# Patient Record
Sex: FEMALE | Race: WHITE | NOT HISPANIC OR LATINO | Employment: FULL TIME | URBAN - METROPOLITAN AREA
[De-identification: names, ages, dates, MRNs, and addresses within clinical notes are randomized per-mention and may not be internally consistent; named-entity substitution may affect disease eponyms.]

---

## 2017-01-18 DIAGNOSIS — K58.9 IRRITABLE BOWEL SYNDROME WITHOUT DIARRHEA: ICD-10-CM

## 2017-01-18 RX ORDER — DICYCLOMINE HYDROCHLORIDE 10 MG/1
CAPSULE ORAL
Qty: 60 CAPSULE | Refills: 2 | Status: SHIPPED | OUTPATIENT
Start: 2017-01-18 | End: 2017-05-01 | Stop reason: SDUPTHER

## 2017-01-30 DIAGNOSIS — J40 BRONCHITIS: ICD-10-CM

## 2017-01-31 RX ORDER — BENZONATATE 200 MG/1
CAPSULE ORAL
Qty: 30 CAPSULE | Refills: 0 | OUTPATIENT
Start: 2017-01-31

## 2017-02-01 ENCOUNTER — OFFICE VISIT (OUTPATIENT)
Dept: FAMILY MEDICINE CLINIC | Facility: CLINIC | Age: 62
End: 2017-02-01

## 2017-02-01 VITALS
RESPIRATION RATE: 18 BRPM | HEART RATE: 83 BPM | SYSTOLIC BLOOD PRESSURE: 123 MMHG | DIASTOLIC BLOOD PRESSURE: 78 MMHG | OXYGEN SATURATION: 92 % | TEMPERATURE: 98.6 F | HEIGHT: 64 IN | BODY MASS INDEX: 50.02 KG/M2 | WEIGHT: 293 LBS

## 2017-02-01 DIAGNOSIS — J45.41 ASTHMATIC BRONCHITIS, MODERATE PERSISTENT, WITH ACUTE EXACERBATION: Primary | ICD-10-CM

## 2017-02-01 PROCEDURE — 94640 AIRWAY INHALATION TREATMENT: CPT | Performed by: FAMILY MEDICINE

## 2017-02-01 PROCEDURE — 99214 OFFICE O/P EST MOD 30 MIN: CPT | Performed by: FAMILY MEDICINE

## 2017-02-01 RX ORDER — IPRATROPIUM BROMIDE AND ALBUTEROL SULFATE 2.5; .5 MG/3ML; MG/3ML
3 SOLUTION RESPIRATORY (INHALATION) EVERY 4 HOURS PRN
Qty: 360 ML | Refills: 3 | Status: SHIPPED | OUTPATIENT
Start: 2017-02-01 | End: 2017-05-01 | Stop reason: SDUPTHER

## 2017-02-01 RX ORDER — LEVOFLOXACIN 500 MG/1
500 TABLET, FILM COATED ORAL DAILY
Qty: 10 TABLET | Refills: 0 | Status: SHIPPED | OUTPATIENT
Start: 2017-02-01 | End: 2017-02-11

## 2017-02-01 RX ORDER — AMOXICILLIN 875 MG/1
TABLET, COATED ORAL
COMMUNITY
Start: 2017-01-29 | End: 2017-02-01

## 2017-02-01 RX ORDER — BENZONATATE 200 MG/1
200 CAPSULE ORAL 3 TIMES DAILY PRN
Qty: 42 CAPSULE | Refills: 2 | Status: SHIPPED | OUTPATIENT
Start: 2017-02-01 | End: 2017-05-31

## 2017-02-01 RX ORDER — METHYLPREDNISOLONE 4 MG/1
TABLET ORAL
COMMUNITY
Start: 2017-01-29 | End: 2017-03-27

## 2017-02-01 RX ORDER — ALBUTEROL SULFATE 90 UG/1
2 AEROSOL, METERED RESPIRATORY (INHALATION) EVERY 6 HOURS PRN
Qty: 1 INHALER | Refills: 2 | Status: SHIPPED | OUTPATIENT
Start: 2017-02-01 | End: 2017-05-01 | Stop reason: SDUPTHER

## 2017-02-01 NOTE — PROGRESS NOTES
"Subjective   Marina Hugo is a 61 y.o. female.     CC: URI    History of Present Illness     Marina Hugo 61 y.o. female who presents for evaluation of sore throat, cough, wheezing. Symptoms include congestion.  Onset of symptoms was 4 days ago, gradually worsening since that time. Patient denies hemoptysis.   Evaluation to date: [facility] Treatment to date:  Amoxil and Medrol..     She has been awaiting her dental appliance and thus is not treating her ERIC yet.    The following portions of the patient's history were reviewed and updated as appropriate: allergies, current medications, past family history, past medical history, past social history, past surgical history and problem list.    Review of Systems   Constitutional: Negative for activity change, chills, fatigue and fever.   Respiratory: Positive for cough and wheezing. Negative for chest tightness and stridor.    Cardiovascular: Negative for chest pain and palpitations.   Gastrointestinal: Negative for abdominal pain and nausea.   Endocrine: Negative for cold intolerance.   Psychiatric/Behavioral: Negative for behavioral problems and dysphoric mood.     Visit Vitals   • /78   • Pulse 83   • Temp 98.6 °F (37 °C) (Oral)   • Resp 18   • Ht 64\" (162.6 cm)   • Wt 294 lb (133 kg)   • SpO2 92%   • BMI 50.46 kg/m2       Objective   Physical Exam   Constitutional: She appears well-developed and well-nourished.   Neck: Neck supple. No thyromegaly present.   Cardiovascular: Normal rate and regular rhythm.    No murmur heard.  Pulmonary/Chest: Effort normal. She has wheezes. She has no rales.   Abdominal: Bowel sounds are normal.   Psychiatric: She has a normal mood and affect. Her behavior is normal.   Nursing note and vitals reviewed.    The patient has read and signed the [facility] Controlled Substance Contract.  I will continue to see patient for regular follow up appointments.  They are well controlled on their medication.  BRYCE has " been reviewed by me and is updated every 3 months. The patient is aware of the potential for addiction and dependence.    Assessment/Plan   Marina was seen today for fever, cough and shortness of breath.    Diagnoses and all orders for this visit:    Asthmatic bronchitis, moderate persistent, with acute exacerbation  -     benzonatate (TESSALON) 200 MG capsule; Take 1 capsule by mouth 3 (Three) Times a Day As Needed for cough.  -     HYDROcod Polst-CPM Polst ER (TUSSIONEX PENNKINETIC ER) 10-8 MG/5ML ER suspension; Take 5 mL by mouth Every 12 (Twelve) Hours As Needed for cough.  -     albuterol (PROAIR HFA) 108 (90 BASE) MCG/ACT inhaler; Inhale 2 puffs Every 6 (Six) Hours As Needed for wheezing.  -     levoFLOXacin (LEVAQUIN) 500 MG tablet; Take 1 tablet by mouth Daily for 10 days.  -     ipratropium-albuterol (DUONEB) 0.5-2.5 mg/mL nebulizer; Take 3 mL by nebulization Every 4 (Four) Hours As Needed for wheezing.    Wrote script for MN machine and medication.    Got pt appt Monday with dentist for ERIC rx as feel this is the reason for recurrence.

## 2017-02-21 RX ORDER — LEVOFLOXACIN 500 MG/1
500 TABLET, FILM COATED ORAL DAILY
Qty: 10 TABLET | Refills: 0 | Status: SHIPPED | OUTPATIENT
Start: 2017-02-21 | End: 2017-03-27

## 2017-02-21 RX ORDER — DEXTROMETHORPHAN HYDROBROMIDE AND PROMETHAZINE HYDROCHLORIDE 15; 6.25 MG/5ML; MG/5ML
5 SYRUP ORAL 4 TIMES DAILY PRN
Qty: 240 ML | Refills: 0 | Status: SHIPPED | OUTPATIENT
Start: 2017-02-21 | End: 2017-08-31 | Stop reason: SDUPTHER

## 2017-02-21 NOTE — TELEPHONE ENCOUNTER
Pt stared feeling a little better after finishing the antibiotics. She started running a fever this weekend and coughing. i sent a refill  If she does not start feeling better she will need to schedule appt

## 2017-02-23 ENCOUNTER — TELEPHONE (OUTPATIENT)
Dept: FAMILY MEDICINE CLINIC | Facility: CLINIC | Age: 62
End: 2017-02-23

## 2017-02-23 DIAGNOSIS — E13.9 DIABETES 1.5, MANAGED AS TYPE 2 (HCC): Primary | ICD-10-CM

## 2017-02-23 NOTE — TELEPHONE ENCOUNTER
PA request for Farxiga Denied; patient has to have tried and failed Invokana and Jardiance first; please advise patient

## 2017-03-27 ENCOUNTER — OFFICE VISIT (OUTPATIENT)
Dept: FAMILY MEDICINE CLINIC | Facility: CLINIC | Age: 62
End: 2017-03-27

## 2017-03-27 VITALS
DIASTOLIC BLOOD PRESSURE: 76 MMHG | HEART RATE: 89 BPM | HEIGHT: 64 IN | OXYGEN SATURATION: 92 % | BODY MASS INDEX: 50.02 KG/M2 | WEIGHT: 293 LBS | SYSTOLIC BLOOD PRESSURE: 134 MMHG | RESPIRATION RATE: 16 BRPM | TEMPERATURE: 98 F

## 2017-03-27 DIAGNOSIS — J45.40 MODERATE PERSISTENT ASTHMA WITHOUT COMPLICATION: Primary | ICD-10-CM

## 2017-03-27 PROCEDURE — 99213 OFFICE O/P EST LOW 20 MIN: CPT | Performed by: FAMILY MEDICINE

## 2017-03-27 RX ORDER — CETIRIZINE HYDROCHLORIDE 10 MG/1
10 TABLET ORAL NIGHTLY
COMMUNITY

## 2017-03-27 NOTE — PROGRESS NOTES
"Subjective   Marina Hugo is a 61 y.o. female.     CC: Management of Cough    History of Present Illness     Pt returns today with continued cough, finishing 3 rounds of Abx and cough persists. Sees  and is on daily inhalers for her Asthma. She is using her new dental appliance for her ERIC and reports sleeping well with that.  In talking with her, it looks like she has NOT been using her Breo since starting all these treatments as she has been using the Duonebs only.    The following portions of the patient's history were reviewed and updated as appropriate: allergies, current medications, past family history, past medical history, past social history, past surgical history and problem list.    Review of Systems   Constitutional: Negative for activity change, chills, fatigue and fever.   Respiratory: Positive for cough. Negative for chest tightness.    Cardiovascular: Negative for chest pain and palpitations.   Gastrointestinal: Negative for abdominal pain and nausea.   Endocrine: Negative for cold intolerance.   Psychiatric/Behavioral: Negative for behavioral problems and dysphoric mood.     /76  Pulse 89  Temp 98 °F (36.7 °C)  Resp 16  Ht 64\" (162.6 cm)  Wt 297 lb (135 kg)  SpO2 92%  Breastfeeding? No  BMI 50.98 kg/m2    Objective   Physical Exam   Constitutional: She appears well-developed and well-nourished.   Neck: Neck supple. No thyromegaly present.   Cardiovascular: Normal rate and regular rhythm.    No murmur heard.  Pulmonary/Chest: Effort normal and breath sounds normal.   Abdominal: Bowel sounds are normal.   Psychiatric: She has a normal mood and affect. Her behavior is normal.   Nursing note and vitals reviewed.      Assessment/Plan   Marina was seen today for bronchitis and cough.    Diagnoses and all orders for this visit:    Moderate persistent asthma without complication  -     Fluticasone Furoate-Vilanterol (BREO ELLIPTA) 200-25 MCG/INH aerosol powder ; Inhale 200 mcg " Daily.      Instructed pt to get back on her Breo and use the Duoneb prn.

## 2017-05-01 ENCOUNTER — TELEPHONE (OUTPATIENT)
Dept: FAMILY MEDICINE CLINIC | Facility: CLINIC | Age: 62
End: 2017-05-01

## 2017-05-01 DIAGNOSIS — J45.40 MODERATE PERSISTENT ASTHMA WITHOUT COMPLICATION: ICD-10-CM

## 2017-05-01 DIAGNOSIS — K58.9 IRRITABLE BOWEL SYNDROME WITHOUT DIARRHEA: ICD-10-CM

## 2017-05-01 DIAGNOSIS — M15.9 PRIMARY OSTEOARTHRITIS INVOLVING MULTIPLE JOINTS: ICD-10-CM

## 2017-05-01 DIAGNOSIS — I10 ESSENTIAL HYPERTENSION: ICD-10-CM

## 2017-05-01 DIAGNOSIS — E13.9 DIABETES 1.5, MANAGED AS TYPE 2 (HCC): ICD-10-CM

## 2017-05-01 DIAGNOSIS — E11.9 TYPE 2 DIABETES MELLITUS WITHOUT COMPLICATION, WITHOUT LONG-TERM CURRENT USE OF INSULIN (HCC): ICD-10-CM

## 2017-05-01 DIAGNOSIS — J45.41 ASTHMATIC BRONCHITIS, MODERATE PERSISTENT, WITH ACUTE EXACERBATION: ICD-10-CM

## 2017-05-01 DIAGNOSIS — E78.2 MIXED HYPERLIPIDEMIA: ICD-10-CM

## 2017-05-01 RX ORDER — METFORMIN HYDROCHLORIDE 500 MG/1
TABLET, EXTENDED RELEASE ORAL
Qty: 120 TABLET | Refills: 0 | Status: SHIPPED | OUTPATIENT
Start: 2017-05-01 | End: 2017-05-31 | Stop reason: SDUPTHER

## 2017-05-01 RX ORDER — DICYCLOMINE HYDROCHLORIDE 10 MG/1
10 CAPSULE ORAL 2 TIMES DAILY
Qty: 60 CAPSULE | Refills: 0 | Status: SHIPPED | OUTPATIENT
Start: 2017-05-01 | End: 2017-05-31 | Stop reason: SDUPTHER

## 2017-05-01 RX ORDER — ALBUTEROL SULFATE 90 UG/1
2 AEROSOL, METERED RESPIRATORY (INHALATION) EVERY 6 HOURS PRN
Qty: 1 INHALER | Refills: 0 | Status: SHIPPED | OUTPATIENT
Start: 2017-05-01 | End: 2022-02-16 | Stop reason: SDUPTHER

## 2017-05-01 RX ORDER — GLIMEPIRIDE 4 MG/1
4 TABLET ORAL
Qty: 30 TABLET | Refills: 0 | Status: SHIPPED | OUTPATIENT
Start: 2017-05-01 | End: 2017-05-31 | Stop reason: SDUPTHER

## 2017-05-01 RX ORDER — ATORVASTATIN CALCIUM 20 MG/1
20 TABLET, FILM COATED ORAL DAILY
Qty: 30 TABLET | Refills: 0 | Status: SHIPPED | OUTPATIENT
Start: 2017-05-01 | End: 2017-05-26 | Stop reason: SDUPTHER

## 2017-05-01 RX ORDER — PIOGLITAZONEHYDROCHLORIDE 30 MG/1
30 TABLET ORAL DAILY
Qty: 30 TABLET | Refills: 0 | Status: SHIPPED | OUTPATIENT
Start: 2017-05-01 | End: 2017-05-31 | Stop reason: SDUPTHER

## 2017-05-01 RX ORDER — IPRATROPIUM BROMIDE AND ALBUTEROL SULFATE 2.5; .5 MG/3ML; MG/3ML
3 SOLUTION RESPIRATORY (INHALATION) EVERY 4 HOURS PRN
Qty: 360 ML | Refills: 0 | Status: SHIPPED | OUTPATIENT
Start: 2017-05-01 | End: 2017-05-31 | Stop reason: SDUPTHER

## 2017-05-01 RX ORDER — AMLODIPINE BESYLATE 10 MG/1
10 TABLET ORAL DAILY
Qty: 30 TABLET | Refills: 0 | Status: SHIPPED | OUTPATIENT
Start: 2017-05-01 | End: 2017-05-31 | Stop reason: SDUPTHER

## 2017-05-01 RX ORDER — VALSARTAN AND HYDROCHLOROTHIAZIDE 320; 25 MG/1; MG/1
1 TABLET, FILM COATED ORAL DAILY
Qty: 30 TABLET | Refills: 0 | Status: SHIPPED | OUTPATIENT
Start: 2017-05-01 | End: 2017-05-31 | Stop reason: SDUPTHER

## 2017-05-01 RX ORDER — MELOXICAM 15 MG/1
15 TABLET ORAL DAILY
Qty: 30 TABLET | Refills: 0 | Status: SHIPPED | OUTPATIENT
Start: 2017-05-01 | End: 2017-05-31 | Stop reason: SDUPTHER

## 2017-05-26 DIAGNOSIS — E78.2 MIXED HYPERLIPIDEMIA: ICD-10-CM

## 2017-05-26 RX ORDER — ATORVASTATIN CALCIUM 20 MG/1
20 TABLET, FILM COATED ORAL DAILY
Qty: 30 TABLET | Refills: 0 | Status: SHIPPED | OUTPATIENT
Start: 2017-05-26 | End: 2017-05-31 | Stop reason: SDUPTHER

## 2017-05-30 RX ORDER — ATORVASTATIN CALCIUM 20 MG/1
TABLET, FILM COATED ORAL
Qty: 30 TABLET | Refills: 2 | OUTPATIENT
Start: 2017-05-30

## 2017-05-31 ENCOUNTER — OFFICE VISIT (OUTPATIENT)
Dept: FAMILY MEDICINE CLINIC | Facility: CLINIC | Age: 62
End: 2017-05-31

## 2017-05-31 VITALS
HEIGHT: 64 IN | TEMPERATURE: 98.4 F | HEART RATE: 94 BPM | RESPIRATION RATE: 18 BRPM | SYSTOLIC BLOOD PRESSURE: 114 MMHG | DIASTOLIC BLOOD PRESSURE: 64 MMHG

## 2017-05-31 DIAGNOSIS — E78.2 MIXED HYPERLIPIDEMIA: ICD-10-CM

## 2017-05-31 DIAGNOSIS — I10 ESSENTIAL HYPERTENSION: ICD-10-CM

## 2017-05-31 DIAGNOSIS — J45.40 MODERATE PERSISTENT ASTHMA WITHOUT COMPLICATION: ICD-10-CM

## 2017-05-31 DIAGNOSIS — J40 BRONCHITIS: Primary | ICD-10-CM

## 2017-05-31 DIAGNOSIS — K58.9 IRRITABLE BOWEL SYNDROME WITHOUT DIARRHEA: ICD-10-CM

## 2017-05-31 DIAGNOSIS — M15.9 PRIMARY OSTEOARTHRITIS INVOLVING MULTIPLE JOINTS: ICD-10-CM

## 2017-05-31 DIAGNOSIS — E11.9 TYPE 2 DIABETES MELLITUS WITHOUT COMPLICATION, WITHOUT LONG-TERM CURRENT USE OF INSULIN (HCC): ICD-10-CM

## 2017-05-31 PROCEDURE — 99214 OFFICE O/P EST MOD 30 MIN: CPT | Performed by: FAMILY MEDICINE

## 2017-05-31 RX ORDER — METFORMIN HYDROCHLORIDE 500 MG/1
TABLET, EXTENDED RELEASE ORAL
Qty: 120 TABLET | Refills: 5 | Status: SHIPPED | OUTPATIENT
Start: 2017-05-31 | End: 2017-12-08 | Stop reason: SDUPTHER

## 2017-05-31 RX ORDER — MELOXICAM 15 MG/1
15 TABLET ORAL DAILY
Qty: 30 TABLET | Refills: 5 | Status: SHIPPED | OUTPATIENT
Start: 2017-05-31 | End: 2017-11-20 | Stop reason: SDUPTHER

## 2017-05-31 RX ORDER — DICYCLOMINE HYDROCHLORIDE 10 MG/1
10 CAPSULE ORAL 2 TIMES DAILY
Qty: 60 CAPSULE | Refills: 5 | Status: SHIPPED | OUTPATIENT
Start: 2017-05-31 | End: 2017-12-12 | Stop reason: SDUPTHER

## 2017-05-31 RX ORDER — ATORVASTATIN CALCIUM 20 MG/1
20 TABLET, FILM COATED ORAL DAILY
Qty: 30 TABLET | Refills: 5 | Status: SHIPPED | OUTPATIENT
Start: 2017-05-31 | End: 2017-11-13 | Stop reason: SDUPTHER

## 2017-05-31 RX ORDER — VALSARTAN AND HYDROCHLOROTHIAZIDE 320; 25 MG/1; MG/1
1 TABLET, FILM COATED ORAL DAILY
Qty: 30 TABLET | Refills: 5 | Status: SHIPPED | OUTPATIENT
Start: 2017-05-31 | End: 2017-12-08 | Stop reason: SDUPTHER

## 2017-05-31 RX ORDER — IPRATROPIUM BROMIDE AND ALBUTEROL SULFATE 2.5; .5 MG/3ML; MG/3ML
3 SOLUTION RESPIRATORY (INHALATION) EVERY 4 HOURS PRN
Qty: 360 ML | Refills: 5 | Status: SHIPPED | OUTPATIENT
Start: 2017-05-31 | End: 2017-12-12 | Stop reason: SDUPTHER

## 2017-05-31 RX ORDER — AMLODIPINE BESYLATE 10 MG/1
10 TABLET ORAL DAILY
Qty: 30 TABLET | Refills: 5 | Status: SHIPPED | OUTPATIENT
Start: 2017-05-31 | End: 2017-12-08 | Stop reason: SDUPTHER

## 2017-05-31 RX ORDER — GLIMEPIRIDE 4 MG/1
4 TABLET ORAL
Qty: 30 TABLET | Refills: 5 | Status: SHIPPED | OUTPATIENT
Start: 2017-05-31 | End: 2017-12-08 | Stop reason: SDUPTHER

## 2017-05-31 RX ORDER — PIOGLITAZONEHYDROCHLORIDE 30 MG/1
30 TABLET ORAL DAILY
Qty: 30 TABLET | Refills: 5 | Status: SHIPPED | OUTPATIENT
Start: 2017-05-31 | End: 2017-12-12 | Stop reason: SDUPTHER

## 2017-05-31 RX ORDER — AZITHROMYCIN 250 MG/1
TABLET, FILM COATED ORAL
Qty: 6 TABLET | Refills: 1 | Status: SHIPPED | OUTPATIENT
Start: 2017-05-31 | End: 2017-08-31 | Stop reason: SDUPTHER

## 2017-06-15 DIAGNOSIS — K58.9 IRRITABLE BOWEL SYNDROME WITHOUT DIARRHEA: ICD-10-CM

## 2017-06-15 RX ORDER — DICYCLOMINE HYDROCHLORIDE 10 MG/1
CAPSULE ORAL
Qty: 60 CAPSULE | Refills: 0 | OUTPATIENT
Start: 2017-06-15

## 2017-07-26 DIAGNOSIS — Z11.59 NEED FOR HEPATITIS C SCREENING TEST: Primary | ICD-10-CM

## 2017-08-01 LAB
ALBUMIN SERPL-MCNC: 4.6 G/DL (ref 3.5–5.2)
ALBUMIN/GLOB SERPL: 1.5 G/DL
ALP SERPL-CCNC: 89 U/L (ref 39–117)
ALT SERPL-CCNC: 17 U/L (ref 1–33)
AST SERPL-CCNC: 21 U/L (ref 1–32)
BASOPHILS # BLD AUTO: 0.02 10*3/MM3 (ref 0–0.2)
BASOPHILS NFR BLD AUTO: 0.2 % (ref 0–1.5)
BILIRUB SERPL-MCNC: 0.4 MG/DL (ref 0.1–1.2)
BUN SERPL-MCNC: 24 MG/DL (ref 8–23)
BUN/CREAT SERPL: 24.7 (ref 7–25)
CALCIUM SERPL-MCNC: 10.3 MG/DL (ref 8.6–10.5)
CHLORIDE SERPL-SCNC: 97 MMOL/L (ref 98–107)
CHOLEST SERPL-MCNC: 149 MG/DL (ref 0–200)
CO2 SERPL-SCNC: 22.8 MMOL/L (ref 22–29)
CREAT SERPL-MCNC: 0.97 MG/DL (ref 0.57–1)
EOSINOPHIL # BLD AUTO: 0.4 10*3/MM3 (ref 0–0.7)
EOSINOPHIL NFR BLD AUTO: 4 % (ref 0.3–6.2)
ERYTHROCYTE [DISTWIDTH] IN BLOOD BY AUTOMATED COUNT: 19.8 % (ref 11.7–13)
GLOBULIN SER CALC-MCNC: 3 GM/DL
GLUCOSE SERPL-MCNC: 130 MG/DL (ref 65–99)
HBA1C MFR BLD: 7.9 % (ref 4.8–5.6)
HCT VFR BLD AUTO: 39.7 % (ref 35.6–45.5)
HDLC SERPL-MCNC: 60 MG/DL (ref 40–60)
HGB BLD-MCNC: 11.1 G/DL (ref 11.9–15.5)
IMM GRANULOCYTES # BLD: 0.04 10*3/MM3 (ref 0–0.03)
IMM GRANULOCYTES NFR BLD: 0.4 % (ref 0–0.5)
LDLC SERPL CALC-MCNC: 59 MG/DL (ref 0–100)
LYMPHOCYTES # BLD AUTO: 2.7 10*3/MM3 (ref 0.9–4.8)
LYMPHOCYTES NFR BLD AUTO: 26.9 % (ref 19.6–45.3)
MCH RBC QN AUTO: 23.2 PG (ref 26.9–32)
MCHC RBC AUTO-ENTMCNC: 28 G/DL (ref 32.4–36.3)
MCV RBC AUTO: 83.1 FL (ref 80.5–98.2)
MICROALBUMIN UR-MCNC: 4.2 UG/ML
MONOCYTES # BLD AUTO: 0.57 10*3/MM3 (ref 0.2–1.2)
MONOCYTES NFR BLD AUTO: 5.7 % (ref 5–12)
NEUTROPHILS # BLD AUTO: 6.32 10*3/MM3 (ref 1.9–8.1)
NEUTROPHILS NFR BLD AUTO: 62.8 % (ref 42.7–76)
PLATELET # BLD AUTO: 448 10*3/MM3 (ref 140–500)
POTASSIUM SERPL-SCNC: 4.9 MMOL/L (ref 3.5–5.2)
PROT SERPL-MCNC: 7.6 G/DL (ref 6–8.5)
RBC # BLD AUTO: 4.78 10*6/MM3 (ref 3.9–5.2)
SODIUM SERPL-SCNC: 141 MMOL/L (ref 136–145)
TRIGL SERPL-MCNC: 152 MG/DL (ref 0–150)
TSH SERPL DL<=0.005 MIU/L-ACNC: 2.37 MIU/ML (ref 0.27–4.2)
VLDLC SERPL CALC-MCNC: 30.4 MG/DL (ref 5–40)
WBC # BLD AUTO: 10.05 10*3/MM3 (ref 4.5–10.7)

## 2017-08-04 ENCOUNTER — CLINICAL SUPPORT (OUTPATIENT)
Dept: FAMILY MEDICINE CLINIC | Facility: CLINIC | Age: 62
End: 2017-08-04

## 2017-08-04 DIAGNOSIS — Z23 NEED FOR SHINGLES VACCINE: Primary | ICD-10-CM

## 2017-08-04 PROCEDURE — 90736 HZV VACCINE LIVE SUBQ: CPT | Performed by: FAMILY MEDICINE

## 2017-08-04 PROCEDURE — 90471 IMMUNIZATION ADMIN: CPT | Performed by: FAMILY MEDICINE

## 2017-08-31 ENCOUNTER — OFFICE VISIT (OUTPATIENT)
Dept: FAMILY MEDICINE CLINIC | Facility: CLINIC | Age: 62
End: 2017-08-31

## 2017-08-31 VITALS
HEART RATE: 86 BPM | TEMPERATURE: 98.7 F | HEIGHT: 64 IN | DIASTOLIC BLOOD PRESSURE: 69 MMHG | RESPIRATION RATE: 16 BRPM | WEIGHT: 293 LBS | SYSTOLIC BLOOD PRESSURE: 100 MMHG | BODY MASS INDEX: 50.02 KG/M2

## 2017-08-31 DIAGNOSIS — J01.00 ACUTE NON-RECURRENT MAXILLARY SINUSITIS: Primary | ICD-10-CM

## 2017-08-31 DIAGNOSIS — J40 BRONCHITIS: ICD-10-CM

## 2017-08-31 PROCEDURE — 99213 OFFICE O/P EST LOW 20 MIN: CPT | Performed by: FAMILY MEDICINE

## 2017-08-31 RX ORDER — AZITHROMYCIN 250 MG/1
TABLET, FILM COATED ORAL
Qty: 6 TABLET | Refills: 1 | Status: SHIPPED | OUTPATIENT
Start: 2017-08-31 | End: 2017-12-12

## 2017-08-31 RX ORDER — DEXTROMETHORPHAN HYDROBROMIDE AND PROMETHAZINE HYDROCHLORIDE 15; 6.25 MG/5ML; MG/5ML
5 SYRUP ORAL 4 TIMES DAILY PRN
Qty: 240 ML | Refills: 0 | Status: SHIPPED | OUTPATIENT
Start: 2017-08-31 | End: 2018-07-04

## 2017-08-31 RX ORDER — BENZONATATE 200 MG/1
200 CAPSULE ORAL 3 TIMES DAILY PRN
Qty: 42 CAPSULE | Refills: 5 | Status: SHIPPED | OUTPATIENT
Start: 2017-08-31 | End: 2017-12-12

## 2017-10-17 ENCOUNTER — FLU SHOT (OUTPATIENT)
Dept: FAMILY MEDICINE CLINIC | Facility: CLINIC | Age: 62
End: 2017-10-17

## 2017-10-17 PROCEDURE — 90471 IMMUNIZATION ADMIN: CPT | Performed by: FAMILY MEDICINE

## 2017-10-17 PROCEDURE — 90686 IIV4 VACC NO PRSV 0.5 ML IM: CPT | Performed by: FAMILY MEDICINE

## 2017-11-13 DIAGNOSIS — E78.2 MIXED HYPERLIPIDEMIA: ICD-10-CM

## 2017-11-13 RX ORDER — ATORVASTATIN CALCIUM 20 MG/1
TABLET, FILM COATED ORAL
Qty: 30 TABLET | Refills: 0 | Status: SHIPPED | OUTPATIENT
Start: 2017-11-13 | End: 2018-02-18 | Stop reason: SDUPTHER

## 2017-11-20 ENCOUNTER — TELEPHONE (OUTPATIENT)
Dept: FAMILY MEDICINE CLINIC | Facility: CLINIC | Age: 62
End: 2017-11-20

## 2017-11-20 DIAGNOSIS — M15.9 PRIMARY OSTEOARTHRITIS INVOLVING MULTIPLE JOINTS: ICD-10-CM

## 2017-11-20 RX ORDER — MELOXICAM 15 MG/1
15 TABLET ORAL DAILY
Qty: 30 TABLET | Refills: 0 | Status: SHIPPED | OUTPATIENT
Start: 2017-11-20 | End: 2017-12-12 | Stop reason: SDUPTHER

## 2017-12-04 ENCOUNTER — TELEPHONE (OUTPATIENT)
Dept: FAMILY MEDICINE CLINIC | Facility: CLINIC | Age: 62
End: 2017-12-04

## 2017-12-04 DIAGNOSIS — I10 ESSENTIAL HYPERTENSION: ICD-10-CM

## 2017-12-04 DIAGNOSIS — E78.2 MIXED HYPERLIPIDEMIA: ICD-10-CM

## 2017-12-04 DIAGNOSIS — E11.9 TYPE 2 DIABETES MELLITUS WITHOUT COMPLICATION, WITHOUT LONG-TERM CURRENT USE OF INSULIN (HCC): Primary | ICD-10-CM

## 2017-12-06 DIAGNOSIS — E11.9 TYPE 2 DIABETES MELLITUS WITHOUT COMPLICATION, WITHOUT LONG-TERM CURRENT USE OF INSULIN (HCC): ICD-10-CM

## 2017-12-06 DIAGNOSIS — I10 ESSENTIAL HYPERTENSION: ICD-10-CM

## 2017-12-07 RX ORDER — EMPAGLIFLOZIN 10 MG/1
TABLET, FILM COATED ORAL
Qty: 30 TABLET | Refills: 0 | OUTPATIENT
Start: 2017-12-07

## 2017-12-07 RX ORDER — METFORMIN HYDROCHLORIDE 500 MG/1
TABLET, EXTENDED RELEASE ORAL
Qty: 120 TABLET | Refills: 0 | OUTPATIENT
Start: 2017-12-07

## 2017-12-07 RX ORDER — VALSARTAN AND HYDROCHLOROTHIAZIDE 320; 25 MG/1; MG/1
TABLET, FILM COATED ORAL
Qty: 30 TABLET | Refills: 0 | OUTPATIENT
Start: 2017-12-07

## 2017-12-07 RX ORDER — AMLODIPINE BESYLATE 10 MG/1
TABLET ORAL
Qty: 30 TABLET | Refills: 0 | OUTPATIENT
Start: 2017-12-07

## 2017-12-07 RX ORDER — GLIMEPIRIDE 4 MG/1
TABLET ORAL
Qty: 30 TABLET | Refills: 0 | OUTPATIENT
Start: 2017-12-07

## 2017-12-08 ENCOUNTER — TELEPHONE (OUTPATIENT)
Dept: FAMILY MEDICINE CLINIC | Facility: CLINIC | Age: 62
End: 2017-12-08

## 2017-12-08 DIAGNOSIS — E11.9 TYPE 2 DIABETES MELLITUS WITHOUT COMPLICATION, WITHOUT LONG-TERM CURRENT USE OF INSULIN (HCC): ICD-10-CM

## 2017-12-08 DIAGNOSIS — I10 ESSENTIAL HYPERTENSION: ICD-10-CM

## 2017-12-08 RX ORDER — METFORMIN HYDROCHLORIDE 500 MG/1
TABLET, EXTENDED RELEASE ORAL
Qty: 120 TABLET | Refills: 0 | Status: SHIPPED | OUTPATIENT
Start: 2017-12-08 | End: 2017-12-12 | Stop reason: SDUPTHER

## 2017-12-08 RX ORDER — AMLODIPINE BESYLATE 10 MG/1
10 TABLET ORAL DAILY
Qty: 30 TABLET | Refills: 0 | Status: SHIPPED | OUTPATIENT
Start: 2017-12-08 | End: 2018-01-12 | Stop reason: SDUPTHER

## 2017-12-08 RX ORDER — GLIMEPIRIDE 4 MG/1
4 TABLET ORAL
Qty: 30 TABLET | Refills: 0 | Status: SHIPPED | OUTPATIENT
Start: 2017-12-08 | End: 2017-12-12 | Stop reason: SDUPTHER

## 2017-12-08 RX ORDER — VALSARTAN AND HYDROCHLOROTHIAZIDE 320; 25 MG/1; MG/1
1 TABLET, FILM COATED ORAL DAILY
Qty: 30 TABLET | Refills: 0 | Status: SHIPPED | OUTPATIENT
Start: 2017-12-08 | End: 2017-12-12 | Stop reason: SDUPTHER

## 2017-12-10 LAB
ALBUMIN SERPL-MCNC: 4.3 G/DL (ref 3.6–4.8)
ALBUMIN/GLOB SERPL: 1.5 {RATIO} (ref 1.2–2.2)
ALP SERPL-CCNC: 88 IU/L (ref 39–117)
ALT SERPL-CCNC: 12 IU/L (ref 0–32)
AST SERPL-CCNC: 12 IU/L (ref 0–40)
BASOPHILS # BLD AUTO: 0 X10E3/UL (ref 0–0.2)
BASOPHILS NFR BLD AUTO: 0 %
BILIRUB SERPL-MCNC: 0.3 MG/DL (ref 0–1.2)
BUN SERPL-MCNC: 22 MG/DL (ref 8–27)
BUN/CREAT SERPL: 27 (ref 12–28)
CALCIUM SERPL-MCNC: 9.7 MG/DL (ref 8.7–10.3)
CHLORIDE SERPL-SCNC: 96 MMOL/L (ref 96–106)
CHOLEST SERPL-MCNC: 130 MG/DL (ref 100–199)
CO2 SERPL-SCNC: 26 MMOL/L (ref 18–29)
CREAT SERPL-MCNC: 0.82 MG/DL (ref 0.57–1)
EOSINOPHIL # BLD AUTO: 0.3 X10E3/UL (ref 0–0.4)
EOSINOPHIL NFR BLD AUTO: 3 %
ERYTHROCYTE [DISTWIDTH] IN BLOOD BY AUTOMATED COUNT: 18.2 % (ref 12.3–15.4)
GFR SERPLBLD CREATININE-BSD FMLA CKD-EPI: 76 ML/MIN/1.73
GFR SERPLBLD CREATININE-BSD FMLA CKD-EPI: 87 ML/MIN/1.73
GLOBULIN SER CALC-MCNC: 2.8 G/DL (ref 1.5–4.5)
GLUCOSE SERPL-MCNC: 135 MG/DL (ref 65–99)
HBA1C MFR BLD: 7.8 % (ref 4.8–5.6)
HCT VFR BLD AUTO: 34.8 % (ref 34–46.6)
HDLC SERPL-MCNC: 57 MG/DL
HGB BLD-MCNC: 10 G/DL (ref 11.1–15.9)
IMM GRANULOCYTES # BLD: 0 X10E3/UL (ref 0–0.1)
IMM GRANULOCYTES NFR BLD: 0 %
LDLC SERPL CALC-MCNC: 48 MG/DL (ref 0–99)
LDLC/HDLC SERPL: 0.8 RATIO UNITS (ref 0–3.2)
LYMPHOCYTES # BLD AUTO: 2.7 X10E3/UL (ref 0.7–3.1)
LYMPHOCYTES NFR BLD AUTO: 26 %
MCH RBC QN AUTO: 22.1 PG (ref 26.6–33)
MCHC RBC AUTO-ENTMCNC: 28.7 G/DL (ref 31.5–35.7)
MCV RBC AUTO: 77 FL (ref 79–97)
MONOCYTES # BLD AUTO: 0.8 X10E3/UL (ref 0.1–0.9)
MONOCYTES NFR BLD AUTO: 8 %
NEUTROPHILS # BLD AUTO: 6.4 X10E3/UL (ref 1.4–7)
NEUTROPHILS NFR BLD AUTO: 63 %
PLATELET # BLD AUTO: 504 X10E3/UL (ref 150–379)
POTASSIUM SERPL-SCNC: 4.9 MMOL/L (ref 3.5–5.2)
PROT SERPL-MCNC: 7.1 G/DL (ref 6–8.5)
RBC # BLD AUTO: 4.53 X10E6/UL (ref 3.77–5.28)
SODIUM SERPL-SCNC: 138 MMOL/L (ref 134–144)
TRIGL SERPL-MCNC: 126 MG/DL (ref 0–149)
TSH SERPL DL<=0.005 MIU/L-ACNC: 1.82 UIU/ML (ref 0.45–4.5)
VLDLC SERPL CALC-MCNC: 25 MG/DL (ref 5–40)
WBC # BLD AUTO: 10.2 X10E3/UL (ref 3.4–10.8)

## 2017-12-12 ENCOUNTER — OFFICE VISIT (OUTPATIENT)
Dept: FAMILY MEDICINE CLINIC | Facility: CLINIC | Age: 62
End: 2017-12-12

## 2017-12-12 VITALS
TEMPERATURE: 97.7 F | HEIGHT: 64 IN | WEIGHT: 293 LBS | RESPIRATION RATE: 16 BRPM | DIASTOLIC BLOOD PRESSURE: 69 MMHG | HEART RATE: 87 BPM | BODY MASS INDEX: 50.02 KG/M2 | SYSTOLIC BLOOD PRESSURE: 115 MMHG

## 2017-12-12 DIAGNOSIS — K58.9 IRRITABLE BOWEL SYNDROME WITHOUT DIARRHEA: ICD-10-CM

## 2017-12-12 DIAGNOSIS — E11.9 TYPE 2 DIABETES MELLITUS WITHOUT COMPLICATION, WITHOUT LONG-TERM CURRENT USE OF INSULIN (HCC): ICD-10-CM

## 2017-12-12 DIAGNOSIS — M25.511 CHRONIC RIGHT SHOULDER PAIN: Primary | ICD-10-CM

## 2017-12-12 DIAGNOSIS — M15.9 PRIMARY OSTEOARTHRITIS INVOLVING MULTIPLE JOINTS: ICD-10-CM

## 2017-12-12 DIAGNOSIS — J45.40 MODERATE PERSISTENT ASTHMA WITHOUT COMPLICATION: ICD-10-CM

## 2017-12-12 DIAGNOSIS — G89.29 CHRONIC RIGHT SHOULDER PAIN: Primary | ICD-10-CM

## 2017-12-12 DIAGNOSIS — I10 ESSENTIAL HYPERTENSION: ICD-10-CM

## 2017-12-12 PROCEDURE — 99214 OFFICE O/P EST MOD 30 MIN: CPT | Performed by: FAMILY MEDICINE

## 2017-12-12 RX ORDER — GLIMEPIRIDE 4 MG/1
4 TABLET ORAL
Qty: 30 TABLET | Refills: 5 | Status: SHIPPED | OUTPATIENT
Start: 2017-12-12 | End: 2018-06-16 | Stop reason: SDUPTHER

## 2017-12-12 RX ORDER — METFORMIN HYDROCHLORIDE 500 MG/1
TABLET, EXTENDED RELEASE ORAL
Qty: 120 TABLET | Refills: 5 | Status: SHIPPED | OUTPATIENT
Start: 2017-12-12 | End: 2018-07-04 | Stop reason: SDUPTHER

## 2017-12-12 RX ORDER — PIOGLITAZONEHYDROCHLORIDE 30 MG/1
30 TABLET ORAL DAILY
Qty: 30 TABLET | Refills: 5 | Status: SHIPPED | OUTPATIENT
Start: 2017-12-12 | End: 2018-07-04 | Stop reason: SDUPTHER

## 2017-12-12 RX ORDER — MELOXICAM 15 MG/1
15 TABLET ORAL DAILY
Qty: 30 TABLET | Refills: 5 | Status: SHIPPED | OUTPATIENT
Start: 2017-12-12 | End: 2018-07-04 | Stop reason: SDUPTHER

## 2017-12-12 RX ORDER — VALSARTAN AND HYDROCHLOROTHIAZIDE 320; 25 MG/1; MG/1
1 TABLET, FILM COATED ORAL DAILY
Qty: 30 TABLET | Refills: 5 | Status: SHIPPED | OUTPATIENT
Start: 2017-12-12 | End: 2018-07-04 | Stop reason: SDUPTHER

## 2017-12-12 RX ORDER — IPRATROPIUM BROMIDE AND ALBUTEROL SULFATE 2.5; .5 MG/3ML; MG/3ML
3 SOLUTION RESPIRATORY (INHALATION) EVERY 4 HOURS PRN
Qty: 360 ML | Refills: 5 | Status: SHIPPED | OUTPATIENT
Start: 2017-12-12 | End: 2019-08-05

## 2017-12-12 RX ORDER — DICYCLOMINE HYDROCHLORIDE 10 MG/1
10 CAPSULE ORAL 2 TIMES DAILY
Qty: 60 CAPSULE | Refills: 5 | Status: SHIPPED | OUTPATIENT
Start: 2017-12-12 | End: 2018-07-04 | Stop reason: SDUPTHER

## 2017-12-12 NOTE — PROGRESS NOTES
Subjective   Marina Hugo is a 62 y.o. female.     History of Present Illness     Chief Complaint:   Chief Complaint   Patient presents with   • Hypertension     MED REFILL - PATIENT REQUESTING WRITTEN RX PLEASE    • Hyperlipidemia   • Diabetes   • Asthma   • Irritable Bowel Syndrome       Marina Hugo 62 y.o. female who presents today for Medical Management of the below listed issues and medication refills.  she has a problem list of   Patient Active Problem List   Diagnosis   • Hypertension   • Asthma   • Diabetes mellitus   • Hyperlipidemia   • Irritable bowel syndrome   • Osteoarthritis   • Chronic cough   .  Since the last visit, she has overall felt well.  she has been compliant with   Current Outpatient Prescriptions:   •  albuterol (PROAIR HFA) 108 (90 BASE) MCG/ACT inhaler, Inhale 2 puffs Every 6 (Six) Hours As Needed for Wheezing., Disp: 1 inhaler, Rfl: 0  •  amLODIPine (NORVASC) 10 MG tablet, Take 1 tablet by mouth Daily., Disp: 30 tablet, Rfl: 0  •  atorvastatin (LIPITOR) 20 MG tablet, TAKE 1 TABLET BY MOUTH ONE TIME A DAY , Disp: 30 tablet, Rfl: 0  •  dicyclomine (BENTYL) 10 MG capsule, Take 1 capsule by mouth 2 (Two) Times a Day., Disp: 60 capsule, Rfl: 5  •  Empagliflozin (JARDIANCE) 10 MG tablet, Take 10 mg by mouth Daily., Disp: 30 tablet, Rfl: 5  •  Fluticasone Furoate-Vilanterol (BREO ELLIPTA) 200-25 MCG/INH aerosol powder , Inhale 200 mcg Daily., Disp: 60 each, Rfl: 5  •  glimepiride (AMARYL) 4 MG tablet, Take 1 tablet by mouth Every Morning Before Breakfast., Disp: 30 tablet, Rfl: 5  •  Insulin Pen Needle (NOVOFINE) 32G X 6 MM misc, Use QD with the Victoza Pen, Disp: 100 each, Rfl: 11  •  ipratropium-albuterol (DUONEB) 0.5-2.5 mg/mL nebulizer, Take 3 mL by nebulization Every 4 (Four) Hours As Needed for Wheezing., Disp: 360 mL, Rfl: 5  •  Liraglutide (VICTOZA) 18 MG/3ML solution pen-injector injection, Inject 1.2 mg under the skin Daily., Disp: 3 mL, Rfl: 5  •  meloxicam (MOBIC) 15 MG  "tablet, Take 1 tablet by mouth Daily., Disp: 30 tablet, Rfl: 5  •  metFORMIN ER (GLUCOPHAGE-XR) 500 MG 24 hr tablet, Take 4 tablets QAM, Disp: 120 tablet, Rfl: 5  •  pioglitazone (ACTOS) 30 MG tablet, Take 1 tablet by mouth Daily., Disp: 30 tablet, Rfl: 5  •  valsartan-hydrochlorothiazide (DIOVAN-HCT) 320-25 MG per tablet, Take 1 tablet by mouth Daily., Disp: 30 tablet, Rfl: 5  •  cetirizine (zyrTEC) 10 MG tablet, Take 10 mg by mouth Daily., Disp: , Rfl:   •  HYDROcod Polst-CPM Polst ER (TUSSIONEX PENNKINETIC ER) 10-8 MG/5ML ER suspension, Take 5 mL by mouth Every 12 (Twelve) Hours As Needed for Cough., Disp: 100 mL, Rfl: 0  •  ketotifen (ZADITOR) 0.025 % ophthalmic solution, 1 drop 2 (two) times a day., Disp: , Rfl:   •  promethazine-dextromethorphan (PROMETHAZINE-DM) 6.25-15 MG/5ML syrup, Take 5 mL by mouth 4 (Four) Times a Day As Needed for Cough., Disp: 240 mL, Rfl: 0.  she denies medication side effects.    She is going to be seeing GI next week and getting scoped.    All of the chronic condition(s) listed above are stable w/o issues.    /69  Pulse 87  Temp 97.7 °F (36.5 °C) (Oral)   Resp 16  Ht 162.6 cm (64\")  Wt 136 kg (300 lb)  BMI 51.49 kg/m2    Results for orders placed or performed in visit on 12/04/17   Comprehensive metabolic panel   Result Value Ref Range    Glucose 135 (H) 65 - 99 mg/dL    BUN 22 8 - 27 mg/dL    Creatinine 0.82 0.57 - 1.00 mg/dL    eGFR Non African Am 76 >59 mL/min/1.73    eGFR African Am 87 >59 mL/min/1.73    BUN/Creatinine Ratio 27 12 - 28    Sodium 138 134 - 144 mmol/L    Potassium 4.9 3.5 - 5.2 mmol/L    Chloride 96 96 - 106 mmol/L    Total CO2 26 18 - 29 mmol/L    Calcium 9.7 8.7 - 10.3 mg/dL    Total Protein 7.1 6.0 - 8.5 g/dL    Albumin 4.3 3.6 - 4.8 g/dL    Globulin 2.8 1.5 - 4.5 g/dL    A/G Ratio 1.5 1.2 - 2.2    Total Bilirubin 0.3 0.0 - 1.2 mg/dL    Alkaline Phosphatase 88 39 - 117 IU/L    AST (SGOT) 12 0 - 40 IU/L    ALT (SGPT) 12 0 - 32 IU/L   Lipid Panel With " LDL/HDL Ratio   Result Value Ref Range    Total Cholesterol 130 100 - 199 mg/dL    Triglycerides 126 0 - 149 mg/dL    HDL Cholesterol 57 >39 mg/dL    VLDL Cholesterol 25 5 - 40 mg/dL    LDL Cholesterol  48 0 - 99 mg/dL    LDL/HDL Ratio 0.8 0.0 - 3.2 ratio units   TSH   Result Value Ref Range    TSH 1.820 0.450 - 4.500 uIU/mL   Hemoglobin A1c   Result Value Ref Range    Hemoglobin A1C 7.8 (H) 4.8 - 5.6 %   CBC and Differential   Result Value Ref Range    WBC 10.2 3.4 - 10.8 x10E3/uL    RBC 4.53 3.77 - 5.28 x10E6/uL    Hemoglobin 10.0 (L) 11.1 - 15.9 g/dL    Hematocrit 34.8 34.0 - 46.6 %    MCV 77 (L) 79 - 97 fL    MCH 22.1 (L) 26.6 - 33.0 pg    MCHC 28.7 (L) 31.5 - 35.7 g/dL    RDW 18.2 (H) 12.3 - 15.4 %    Platelets 504 (H) 150 - 379 x10E3/uL    Neutrophil Rel % 63 Not Estab. %    Lymphocyte Rel % 26 Not Estab. %    Monocyte Rel % 8 Not Estab. %    Eosinophil Rel % 3 Not Estab. %    Basophil Rel % 0 Not Estab. %    Neutrophils Absolute 6.4 1.4 - 7.0 x10E3/uL    Lymphocytes Absolute 2.7 0.7 - 3.1 x10E3/uL    Monocytes Absolute 0.8 0.1 - 0.9 x10E3/uL    Eosinophils Absolute 0.3 0.0 - 0.4 x10E3/uL    Basophils Absolute 0.0 0.0 - 0.2 x10E3/uL    Immature Granulocyte Rel % 0 Not Estab. %    Immature Grans Absolute 0.0 0.0 - 0.1 x10E3/uL           The following portions of the patient's history were reviewed and updated as appropriate: allergies, current medications, past family history, past medical history, past social history, past surgical history and problem list.    Review of Systems   Constitutional: Negative for activity change, chills, fatigue and fever.   Respiratory: Negative for cough and chest tightness.    Cardiovascular: Negative for chest pain and palpitations.   Gastrointestinal: Negative for abdominal pain and nausea.   Endocrine: Negative for cold intolerance.   Psychiatric/Behavioral: Negative for behavioral problems and dysphoric mood.       Objective   Physical Exam   Constitutional: She appears  well-developed and well-nourished.   Neck: Neck supple. No thyromegaly present.   Cardiovascular: Normal rate and regular rhythm.    No murmur heard.  Pulmonary/Chest: Effort normal and breath sounds normal.   Abdominal: Bowel sounds are normal.   Psychiatric: She has a normal mood and affect. Her behavior is normal.   Nursing note and vitals reviewed.  Labs reviewed with pt today during visit. All questions answered.      Assessment/Plan   Marina was seen today for hypertension, hyperlipidemia, diabetes, asthma and irritable bowel syndrome.    Diagnoses and all orders for this visit:    Chronic right shoulder pain  -     Ambulatory Referral to Orthopedic Surgery    Type 2 diabetes mellitus without complication, without long-term current use of insulin  -     Liraglutide (VICTOZA) 18 MG/3ML solution pen-injector injection; Inject 1.2 mg under the skin Daily.  -     glimepiride (AMARYL) 4 MG tablet; Take 1 tablet by mouth Every Morning Before Breakfast.  -     Empagliflozin (JARDIANCE) 10 MG tablet; Take 10 mg by mouth Daily.  -     Insulin Pen Needle (NOVOFINE) 32G X 6 MM misc; Use QD with the Victoza Pen  -     metFORMIN ER (GLUCOPHAGE-XR) 500 MG 24 hr tablet; Take 4 tablets QAM  -     pioglitazone (ACTOS) 30 MG tablet; Take 1 tablet by mouth Daily.    Moderate persistent asthma without complication  -     Fluticasone Furoate-Vilanterol (BREO ELLIPTA) 200-25 MCG/INH aerosol powder ; Inhale 200 mcg Daily.  -     ipratropium-albuterol (DUONEB) 0.5-2.5 mg/mL nebulizer; Take 3 mL by nebulization Every 4 (Four) Hours As Needed for Wheezing.    Irritable bowel syndrome without diarrhea  -     dicyclomine (BENTYL) 10 MG capsule; Take 1 capsule by mouth 2 (Two) Times a Day.    Primary osteoarthritis involving multiple joints  -     meloxicam (MOBIC) 15 MG tablet; Take 1 tablet by mouth Daily.    Essential hypertension  -     valsartan-hydrochlorothiazide (DIOVAN-HCT) 320-25 MG per tablet; Take 1 tablet by mouth  Daily.

## 2017-12-18 ENCOUNTER — ON CAMPUS - OUTPATIENT (AMBULATORY)
Dept: URBAN - METROPOLITAN AREA HOSPITAL 108 | Facility: HOSPITAL | Age: 62
End: 2017-12-18
Payer: COMMERCIAL

## 2017-12-18 DIAGNOSIS — Z80.0 FAMILY HISTORY OF MALIGNANT NEOPLASM OF DIGESTIVE ORGANS: ICD-10-CM

## 2017-12-18 DIAGNOSIS — K64.8 OTHER HEMORRHOIDS: ICD-10-CM

## 2017-12-18 DIAGNOSIS — D12.2 BENIGN NEOPLASM OF ASCENDING COLON: ICD-10-CM

## 2017-12-18 DIAGNOSIS — Z86.010 PERSONAL HISTORY OF COLONIC POLYPS: ICD-10-CM

## 2017-12-18 DIAGNOSIS — K57.30 DIVERTICULOSIS OF LARGE INTESTINE WITHOUT PERFORATION OR ABS: ICD-10-CM

## 2017-12-18 PROCEDURE — 45380 COLONOSCOPY AND BIOPSY: CPT | Mod: 33 | Performed by: INTERNAL MEDICINE

## 2018-01-09 DIAGNOSIS — I10 ESSENTIAL HYPERTENSION: ICD-10-CM

## 2018-01-10 RX ORDER — AMLODIPINE BESYLATE 10 MG/1
TABLET ORAL
Qty: 30 TABLET | Refills: 0 | OUTPATIENT
Start: 2018-01-10

## 2018-01-11 DIAGNOSIS — I10 ESSENTIAL HYPERTENSION: ICD-10-CM

## 2018-01-11 RX ORDER — AMLODIPINE BESYLATE 10 MG/1
TABLET ORAL
Qty: 30 TABLET | Refills: 0 | Status: CANCELLED | OUTPATIENT
Start: 2018-01-11

## 2018-01-12 DIAGNOSIS — I10 ESSENTIAL HYPERTENSION: ICD-10-CM

## 2018-01-12 RX ORDER — AMLODIPINE BESYLATE 10 MG/1
10 TABLET ORAL DAILY
Qty: 30 TABLET | Refills: 4 | Status: SHIPPED | OUTPATIENT
Start: 2018-01-12 | End: 2018-06-21 | Stop reason: SDUPTHER

## 2018-02-18 DIAGNOSIS — E78.2 MIXED HYPERLIPIDEMIA: ICD-10-CM

## 2018-02-19 RX ORDER — ATORVASTATIN CALCIUM 20 MG/1
TABLET, FILM COATED ORAL
Qty: 30 TABLET | Refills: 3 | Status: SHIPPED | OUTPATIENT
Start: 2018-02-19 | End: 2018-07-04 | Stop reason: SDUPTHER

## 2018-04-02 ENCOUNTER — OFFICE VISIT (OUTPATIENT)
Dept: FAMILY MEDICINE CLINIC | Facility: CLINIC | Age: 63
End: 2018-04-02

## 2018-04-02 VITALS
HEART RATE: 56 BPM | WEIGHT: 293 LBS | RESPIRATION RATE: 16 BRPM | BODY MASS INDEX: 51.49 KG/M2 | DIASTOLIC BLOOD PRESSURE: 72 MMHG | TEMPERATURE: 97.6 F | OXYGEN SATURATION: 99 % | SYSTOLIC BLOOD PRESSURE: 105 MMHG

## 2018-04-02 DIAGNOSIS — J40 BRONCHITIS: ICD-10-CM

## 2018-04-02 DIAGNOSIS — J01.00 ACUTE NON-RECURRENT MAXILLARY SINUSITIS: Primary | ICD-10-CM

## 2018-04-02 PROCEDURE — 99213 OFFICE O/P EST LOW 20 MIN: CPT | Performed by: FAMILY MEDICINE

## 2018-04-02 RX ORDER — BENZONATATE 200 MG/1
200 CAPSULE ORAL 3 TIMES DAILY PRN
Qty: 30 CAPSULE | Refills: 11 | Status: SHIPPED | OUTPATIENT
Start: 2018-04-02 | End: 2018-07-05

## 2018-04-02 RX ORDER — AMOXICILLIN AND CLAVULANATE POTASSIUM 875; 125 MG/1; MG/1
1 TABLET, FILM COATED ORAL EVERY 12 HOURS SCHEDULED
Qty: 20 TABLET | Refills: 0 | Status: SHIPPED | OUTPATIENT
Start: 2018-04-02 | End: 2018-07-04

## 2018-04-13 DIAGNOSIS — J40 BRONCHITIS: ICD-10-CM

## 2018-04-13 DIAGNOSIS — J01.00 ACUTE NON-RECURRENT MAXILLARY SINUSITIS: ICD-10-CM

## 2018-04-13 RX ORDER — AMOXICILLIN AND CLAVULANATE POTASSIUM 875; 125 MG/1; MG/1
TABLET, FILM COATED ORAL
Qty: 20 TABLET | Refills: 0 | OUTPATIENT
Start: 2018-04-13

## 2018-06-16 DIAGNOSIS — E11.9 TYPE 2 DIABETES MELLITUS WITHOUT COMPLICATION, WITHOUT LONG-TERM CURRENT USE OF INSULIN (HCC): ICD-10-CM

## 2018-06-18 RX ORDER — GLIMEPIRIDE 4 MG/1
TABLET ORAL
Qty: 30 TABLET | Refills: 0 | Status: SHIPPED | OUTPATIENT
Start: 2018-06-18 | End: 2018-07-04 | Stop reason: SDUPTHER

## 2018-06-21 DIAGNOSIS — I10 ESSENTIAL HYPERTENSION: ICD-10-CM

## 2018-06-22 RX ORDER — AMLODIPINE BESYLATE 10 MG/1
10 TABLET ORAL DAILY
Qty: 30 TABLET | Refills: 0 | Status: SHIPPED | OUTPATIENT
Start: 2018-06-22 | End: 2018-07-04 | Stop reason: SDUPTHER

## 2018-07-04 NOTE — PROGRESS NOTES
Subjective   Marina Hugo is a 62 y.o. female.     History of Present Illness     Chief Complaint:   Chief Complaint   Patient presents with   • Diabetes     med refill    • Hypertension   • Hyperlipidemia   • Irritable Bowel Syndrome       Marina Hugo 62 y.o. female who presents today for Medical Management of the below listed issues and medication refills.  she has a problem list of   Patient Active Problem List   Diagnosis   • Hypertension   • Asthma   • Diabetes mellitus (CMS/[facility])   • Hyperlipidemia   • Irritable bowel syndrome   • Osteoarthritis   • Chronic cough   .  Since the last visit, she has overall felt well.  she has been compliant with   Current Outpatient Prescriptions:   •  albuterol (PROAIR HFA) 108 (90 BASE) MCG/ACT inhaler, Inhale 2 puffs Every 6 (Six) Hours As Needed for Wheezing., Disp: 1 inhaler, Rfl: 0  •  amLODIPine (NORVASC) 10 MG tablet, Take 1 tablet by mouth Daily., Disp: 30 tablet, Rfl: 5  •  atorvastatin (LIPITOR) 20 MG tablet, Take 1 tablet by mouth Daily., Disp: 30 tablet, Rfl: 5  •  cetirizine (zyrTEC) 10 MG tablet, Take 10 mg by mouth Daily., Disp: , Rfl:   •  dicyclomine (BENTYL) 10 MG capsule, Take 1 capsule by mouth 2 (Two) Times a Day., Disp: 60 capsule, Rfl: 5  •  Empagliflozin (JARDIANCE) 10 MG tablet, Take 10 mg by mouth Daily., Disp: 30 tablet, Rfl: 5  •  Fluticasone Furoate-Vilanterol (BREO ELLIPTA) 200-25 MCG/INH aerosol powder , Inhale 200 mcg Daily., Disp: 60 each, Rfl: 5  •  glimepiride (AMARYL) 4 MG tablet, Take 1 tablet by mouth Every Morning Before Breakfast., Disp: 30 tablet, Rfl: 5  •  Insulin Pen Needle (NOVOFINE) 32G X 6 MM misc, Use QD with the Victoza Pen, Disp: 100 each, Rfl: 11  •  ipratropium-albuterol (DUONEB) 0.5-2.5 mg/mL nebulizer, Take 3 mL by nebulization Every 4 (Four) Hours As Needed for Wheezing., Disp: 360 mL, Rfl: 5  •  ketotifen (ZADITOR) 0.025 % ophthalmic solution, 1 drop 2 (two) times a day., Disp: , Rfl:   •  Liraglutide (VICTOZA) 18  "MG/3ML solution pen-injector injection, Inject 1.2 mg under the skin Daily., Disp: 3 mL, Rfl: 5  •  meloxicam (MOBIC) 15 MG tablet, Take 1 tablet by mouth Daily., Disp: 30 tablet, Rfl: 5  •  metFORMIN ER (GLUCOPHAGE-XR) 500 MG 24 hr tablet, Take 4 tablets QAM, Disp: 120 tablet, Rfl: 5  •  pioglitazone (ACTOS) 30 MG tablet, Take 1 tablet by mouth Daily., Disp: 30 tablet, Rfl: 5  •  terbinafine (lamiSIL) 250 MG tablet, Take 250 mg by mouth Daily., Disp: , Rfl:   •  valsartan-hydrochlorothiazide (DIOVAN-HCT) 320-25 MG per tablet, Take 1 tablet by mouth Daily., Disp: 30 tablet, Rfl: 5.  she denies medication side effects.    All of the chronic condition(s) listed above are stable w/o issues.    /64   Pulse 79   Temp 97.7 °F (36.5 °C) (Oral)   Resp 16   Ht 162.6 cm (64\")   Wt 134 kg (295 lb)   BMI 50.64 kg/m²     Results for orders placed or performed in visit on 12/04/17   Comprehensive metabolic panel   Result Value Ref Range    Glucose 135 (H) 65 - 99 mg/dL    BUN 22 8 - 27 mg/dL    Creatinine 0.82 0.57 - 1.00 mg/dL    eGFR Non African Am 76 >59 mL/min/1.73    eGFR African Am 87 >59 mL/min/1.73    BUN/Creatinine Ratio 27 12 - 28    Sodium 138 134 - 144 mmol/L    Potassium 4.9 3.5 - 5.2 mmol/L    Chloride 96 96 - 106 mmol/L    Total CO2 26 18 - 29 mmol/L    Calcium 9.7 8.7 - 10.3 mg/dL    Total Protein 7.1 6.0 - 8.5 g/dL    Albumin 4.3 3.6 - 4.8 g/dL    Globulin 2.8 1.5 - 4.5 g/dL    A/G Ratio 1.5 1.2 - 2.2    Total Bilirubin 0.3 0.0 - 1.2 mg/dL    Alkaline Phosphatase 88 39 - 117 IU/L    AST (SGOT) 12 0 - 40 IU/L    ALT (SGPT) 12 0 - 32 IU/L   Lipid Panel With LDL/HDL Ratio   Result Value Ref Range    Total Cholesterol 130 100 - 199 mg/dL    Triglycerides 126 0 - 149 mg/dL    HDL Cholesterol 57 >39 mg/dL    VLDL Cholesterol 25 5 - 40 mg/dL    LDL Cholesterol  48 0 - 99 mg/dL    LDL/HDL Ratio 0.8 0.0 - 3.2 ratio units   TSH   Result Value Ref Range    TSH 1.820 0.450 - 4.500 uIU/mL   Hemoglobin A1c   Result " Value Ref Range    Hemoglobin A1C 7.8 (H) 4.8 - 5.6 %   CBC and Differential   Result Value Ref Range    WBC 10.2 3.4 - 10.8 x10E3/uL    RBC 4.53 3.77 - 5.28 x10E6/uL    Hemoglobin 10.0 (L) 11.1 - 15.9 g/dL    Hematocrit 34.8 34.0 - 46.6 %    MCV 77 (L) 79 - 97 fL    MCH 22.1 (L) 26.6 - 33.0 pg    MCHC 28.7 (L) 31.5 - 35.7 g/dL    RDW 18.2 (H) 12.3 - 15.4 %    Platelets 504 (H) 150 - 379 x10E3/uL    Neutrophil Rel % 63 Not Estab. %    Lymphocyte Rel % 26 Not Estab. %    Monocyte Rel % 8 Not Estab. %    Eosinophil Rel % 3 Not Estab. %    Basophil Rel % 0 Not Estab. %    Neutrophils Absolute 6.4 1.4 - 7.0 x10E3/uL    Lymphocytes Absolute 2.7 0.7 - 3.1 x10E3/uL    Monocytes Absolute 0.8 0.1 - 0.9 x10E3/uL    Eosinophils Absolute 0.3 0.0 - 0.4 x10E3/uL    Basophils Absolute 0.0 0.0 - 0.2 x10E3/uL    Immature Granulocyte Rel % 0 Not Estab. %    Immature Grans Absolute 0.0 0.0 - 0.1 x10E3/uL           The following portions of the patient's history were reviewed and updated as appropriate: allergies, current medications, past family history, past medical history, past social history, past surgical history and problem list.    Review of Systems   Constitutional: Negative for activity change, chills, fatigue and fever.   Respiratory: Negative for cough and chest tightness.    Cardiovascular: Negative for chest pain and palpitations.   Gastrointestinal: Negative for abdominal pain and nausea.   Endocrine: Negative for cold intolerance.   Psychiatric/Behavioral: Negative for behavioral problems and dysphoric mood.       Objective   Physical Exam   Constitutional: She appears well-developed and well-nourished.   Neck: Neck supple. No thyromegaly present.   Cardiovascular: Normal rate and regular rhythm.    No murmur heard.  Pulmonary/Chest: Effort normal and breath sounds normal.   Abdominal: Bowel sounds are normal. There is no tenderness.      During the foot exam she had a monofilament test performed.    Neurological  Sensory Findings - Unaltered hot/cold right ankle/foot discrimination and unaltered hot/cold left ankle/foot discrimination. Unaltered sharp/dull right ankle/foot discrimination and unaltered sharp/dull left ankle/foot discrimination. No right ankle/foot altered proprioception and no left ankle/foot altered proprioception  Vascular Status -  Her right foot exhibits normal foot vasculature  and no edema. Her left foot exhibits normal foot vasculature  and no edema.  Skin Integrity  -  Her right foot skin is intact.Her left foot skin is intact..  Neurological: She is alert.   Psychiatric: She has a normal mood and affect. Her behavior is normal.   Nursing note and vitals reviewed.      Assessment/Plan   Marina was seen today for diabetes, hypertension, hyperlipidemia and irritable bowel syndrome.    Diagnoses and all orders for this visit:    Type 2 diabetes mellitus without complication, without long-term current use of insulin (CMS/[facility])  -     metFORMIN ER (GLUCOPHAGE-XR) 500 MG 24 hr tablet; Take 4 tablets QAM  -     pioglitazone (ACTOS) 30 MG tablet; Take 1 tablet by mouth Daily.  -     Liraglutide (VICTOZA) 18 MG/3ML solution pen-injector injection; Inject 1.2 mg under the skin Daily.  -     glimepiride (AMARYL) 4 MG tablet; Take 1 tablet by mouth Every Morning Before Breakfast.  -     Empagliflozin (JARDIANCE) 10 MG tablet; Take 10 mg by mouth Daily.  -     Comprehensive metabolic panel  -     Lipid panel  -     Hemoglobin A1c  -     MicroAlbumin, Urine, Random - Urine, Clean Catch    Primary osteoarthritis involving multiple joints  -     meloxicam (MOBIC) 15 MG tablet; Take 1 tablet by mouth Daily.    Essential hypertension  -     valsartan-hydrochlorothiazide (DIOVAN-HCT) 320-25 MG per tablet; Take 1 tablet by mouth Daily.  -     amLODIPine (NORVASC) 10 MG tablet; Take 1 tablet by mouth Daily.  -     CBC and Differential  -     TSH    Moderate persistent asthma without complication  -     Fluticasone  Furoate-Vilanterol (BREO ELLIPTA) 200-25 MCG/INH aerosol powder ; Inhale 200 mcg Daily.    Irritable bowel syndrome without diarrhea  -     dicyclomine (BENTYL) 10 MG capsule; Take 1 capsule by mouth 2 (Two) Times a Day.    Mixed hyperlipidemia  -     atorvastatin (LIPITOR) 20 MG tablet; Take 1 tablet by mouth Daily.  -     Lipid panel

## 2018-07-05 ENCOUNTER — OFFICE VISIT (OUTPATIENT)
Dept: FAMILY MEDICINE CLINIC | Facility: CLINIC | Age: 63
End: 2018-07-05

## 2018-07-05 VITALS
HEART RATE: 79 BPM | TEMPERATURE: 97.7 F | HEIGHT: 64 IN | BODY MASS INDEX: 50.02 KG/M2 | RESPIRATION RATE: 16 BRPM | SYSTOLIC BLOOD PRESSURE: 107 MMHG | WEIGHT: 293 LBS | DIASTOLIC BLOOD PRESSURE: 64 MMHG

## 2018-07-05 DIAGNOSIS — E78.2 MIXED HYPERLIPIDEMIA: ICD-10-CM

## 2018-07-05 DIAGNOSIS — K58.9 IRRITABLE BOWEL SYNDROME WITHOUT DIARRHEA: ICD-10-CM

## 2018-07-05 DIAGNOSIS — I10 ESSENTIAL HYPERTENSION: ICD-10-CM

## 2018-07-05 DIAGNOSIS — E11.9 TYPE 2 DIABETES MELLITUS WITHOUT COMPLICATION, WITHOUT LONG-TERM CURRENT USE OF INSULIN (HCC): ICD-10-CM

## 2018-07-05 DIAGNOSIS — J45.40 MODERATE PERSISTENT ASTHMA WITHOUT COMPLICATION: ICD-10-CM

## 2018-07-05 DIAGNOSIS — M15.9 PRIMARY OSTEOARTHRITIS INVOLVING MULTIPLE JOINTS: ICD-10-CM

## 2018-07-05 PROCEDURE — 99214 OFFICE O/P EST MOD 30 MIN: CPT | Performed by: FAMILY MEDICINE

## 2018-07-05 RX ORDER — GLIMEPIRIDE 4 MG/1
4 TABLET ORAL
Qty: 30 TABLET | Refills: 5 | Status: SHIPPED | OUTPATIENT
Start: 2018-07-05 | End: 2019-01-31 | Stop reason: SDUPTHER

## 2018-07-05 RX ORDER — TERBINAFINE HYDROCHLORIDE 250 MG/1
250 TABLET ORAL DAILY
COMMUNITY
End: 2019-03-29

## 2018-07-05 RX ORDER — AMLODIPINE BESYLATE 10 MG/1
10 TABLET ORAL DAILY
Qty: 30 TABLET | Refills: 5 | Status: SHIPPED | OUTPATIENT
Start: 2018-07-05 | End: 2019-01-31 | Stop reason: SDUPTHER

## 2018-07-05 RX ORDER — ATORVASTATIN CALCIUM 20 MG/1
20 TABLET, FILM COATED ORAL DAILY
Qty: 30 TABLET | Refills: 5 | Status: SHIPPED | OUTPATIENT
Start: 2018-07-05 | End: 2019-01-24 | Stop reason: SDUPTHER

## 2018-07-05 RX ORDER — MELOXICAM 15 MG/1
15 TABLET ORAL DAILY
Qty: 30 TABLET | Refills: 5 | Status: SHIPPED | OUTPATIENT
Start: 2018-07-05 | End: 2019-01-31 | Stop reason: SDUPTHER

## 2018-07-05 RX ORDER — PIOGLITAZONEHYDROCHLORIDE 30 MG/1
30 TABLET ORAL DAILY
Qty: 30 TABLET | Refills: 5 | Status: SHIPPED | OUTPATIENT
Start: 2018-07-05 | End: 2019-01-31 | Stop reason: SDUPTHER

## 2018-07-05 RX ORDER — DICYCLOMINE HYDROCHLORIDE 10 MG/1
10 CAPSULE ORAL 2 TIMES DAILY
Qty: 60 CAPSULE | Refills: 5 | Status: SHIPPED | OUTPATIENT
Start: 2018-07-05 | End: 2019-01-31 | Stop reason: SDUPTHER

## 2018-07-05 RX ORDER — VALSARTAN AND HYDROCHLOROTHIAZIDE 320; 25 MG/1; MG/1
1 TABLET, FILM COATED ORAL DAILY
Qty: 30 TABLET | Refills: 5 | Status: SHIPPED | OUTPATIENT
Start: 2018-07-05 | End: 2019-01-31 | Stop reason: SDUPTHER

## 2018-07-05 RX ORDER — METFORMIN HYDROCHLORIDE 500 MG/1
TABLET, EXTENDED RELEASE ORAL
Qty: 120 TABLET | Refills: 5 | Status: SHIPPED | OUTPATIENT
Start: 2018-07-05 | End: 2019-01-31 | Stop reason: SDUPTHER

## 2018-07-07 LAB
ALBUMIN SERPL-MCNC: 4.9 G/DL (ref 3.5–5.2)
ALBUMIN/GLOB SERPL: 1.9 G/DL
ALP SERPL-CCNC: 95 U/L (ref 39–117)
ALT SERPL-CCNC: 12 U/L (ref 1–33)
AST SERPL-CCNC: 10 U/L (ref 1–32)
BASOPHILS # BLD AUTO: 0.06 10*3/MM3 (ref 0–0.2)
BASOPHILS NFR BLD AUTO: 0.5 % (ref 0–1.5)
BILIRUB SERPL-MCNC: 0.4 MG/DL (ref 0.1–1.2)
BUN SERPL-MCNC: 28 MG/DL (ref 8–23)
BUN/CREAT SERPL: 28.9 (ref 7–25)
CALCIUM SERPL-MCNC: 9.8 MG/DL (ref 8.6–10.5)
CHLORIDE SERPL-SCNC: 98 MMOL/L (ref 98–107)
CHOLEST SERPL-MCNC: 157 MG/DL (ref 0–200)
CO2 SERPL-SCNC: 25 MMOL/L (ref 22–29)
CREAT SERPL-MCNC: 0.97 MG/DL (ref 0.57–1)
EOSINOPHIL # BLD AUTO: 0.22 10*3/MM3 (ref 0–0.7)
EOSINOPHIL NFR BLD AUTO: 1.9 % (ref 0.3–6.2)
ERYTHROCYTE [DISTWIDTH] IN BLOOD BY AUTOMATED COUNT: 19.6 % (ref 11.7–13)
GLOBULIN SER CALC-MCNC: 2.6 GM/DL
GLUCOSE SERPL-MCNC: 131 MG/DL (ref 65–99)
HBA1C MFR BLD: 8.42 % (ref 4.8–5.6)
HCT VFR BLD AUTO: 39.8 % (ref 35.6–45.5)
HDLC SERPL-MCNC: 64 MG/DL (ref 40–60)
HGB BLD-MCNC: 11.4 G/DL (ref 11.9–15.5)
IMM GRANULOCYTES # BLD: 0.07 10*3/MM3 (ref 0–0.03)
IMM GRANULOCYTES NFR BLD: 0.6 % (ref 0–0.5)
LDLC SERPL CALC-MCNC: 65 MG/DL (ref 0–100)
LYMPHOCYTES # BLD AUTO: 2.49 10*3/MM3 (ref 0.9–4.8)
LYMPHOCYTES NFR BLD AUTO: 22 % (ref 19.6–45.3)
MCH RBC QN AUTO: 23 PG (ref 26.9–32)
MCHC RBC AUTO-ENTMCNC: 28.6 G/DL (ref 32.4–36.3)
MCV RBC AUTO: 80.2 FL (ref 80.5–98.2)
MICROALBUMIN UR-MCNC: <3 UG/ML
MONOCYTES # BLD AUTO: 0.64 10*3/MM3 (ref 0.2–1.2)
MONOCYTES NFR BLD AUTO: 5.6 % (ref 5–12)
NEUTROPHILS # BLD AUTO: 7.93 10*3/MM3 (ref 1.9–8.1)
NEUTROPHILS NFR BLD AUTO: 70 % (ref 42.7–76)
NRBC BLD AUTO-RTO: 0 /100 WBC (ref 0–0)
PLATELET # BLD AUTO: 475 10*3/MM3 (ref 140–500)
POTASSIUM SERPL-SCNC: 4.6 MMOL/L (ref 3.5–5.2)
PROT SERPL-MCNC: 7.5 G/DL (ref 6–8.5)
RBC # BLD AUTO: 4.96 10*6/MM3 (ref 3.9–5.2)
SODIUM SERPL-SCNC: 139 MMOL/L (ref 136–145)
TRIGL SERPL-MCNC: 141 MG/DL (ref 0–150)
TSH SERPL DL<=0.005 MIU/L-ACNC: 1.83 MIU/ML (ref 0.27–4.2)
VLDLC SERPL CALC-MCNC: 28.2 MG/DL (ref 5–40)
WBC # BLD AUTO: 11.34 10*3/MM3 (ref 4.5–10.7)

## 2018-07-13 ENCOUNTER — TELEPHONE (OUTPATIENT)
Dept: FAMILY MEDICINE CLINIC | Facility: CLINIC | Age: 63
End: 2018-07-13

## 2018-07-13 DIAGNOSIS — D64.9 ANEMIA, UNSPECIFIED TYPE: Primary | ICD-10-CM

## 2018-07-13 NOTE — TELEPHONE ENCOUNTER
----- Message from Dwayne Allen MD sent at 7/7/2018  8:10 PM EDT -----  A1C took a jump. Increase healthy diet/weight loss and exercise. Anemia improving but still present and had c-scope 12/17. Offer her appt with [facility] as could be some type of iron absorption issue due to her prior stomach surgery.

## 2018-07-18 NOTE — TELEPHONE ENCOUNTER
Pt calls back and states that she does not wish to see a specialist at this time. She said she does not want to pursue this.

## 2018-07-30 ENCOUNTER — APPOINTMENT (OUTPATIENT)
Dept: ONCOLOGY | Facility: CLINIC | Age: 63
End: 2018-07-30

## 2018-07-30 ENCOUNTER — APPOINTMENT (OUTPATIENT)
Dept: OTHER | Facility: HOSPITAL | Age: 63
End: 2018-07-30

## 2018-09-27 ENCOUNTER — OFFICE VISIT (OUTPATIENT)
Dept: FAMILY MEDICINE CLINIC | Facility: CLINIC | Age: 63
End: 2018-09-27

## 2018-09-27 VITALS
WEIGHT: 291 LBS | RESPIRATION RATE: 20 BRPM | TEMPERATURE: 98 F | BODY MASS INDEX: 49.68 KG/M2 | HEART RATE: 90 BPM | SYSTOLIC BLOOD PRESSURE: 110 MMHG | DIASTOLIC BLOOD PRESSURE: 72 MMHG | HEIGHT: 64 IN | OXYGEN SATURATION: 96 %

## 2018-09-27 DIAGNOSIS — J01.00 ACUTE MAXILLARY SINUSITIS, RECURRENCE NOT SPECIFIED: Primary | ICD-10-CM

## 2018-09-27 PROCEDURE — 99213 OFFICE O/P EST LOW 20 MIN: CPT | Performed by: NURSE PRACTITIONER

## 2018-09-27 RX ORDER — AMOXICILLIN AND CLAVULANATE POTASSIUM 875; 125 MG/1; MG/1
1 TABLET, FILM COATED ORAL 2 TIMES DAILY
Qty: 20 TABLET | Refills: 0 | Status: SHIPPED | OUTPATIENT
Start: 2018-09-27 | End: 2018-10-07

## 2018-09-27 RX ORDER — DEXTROMETHORPHAN HYDROBROMIDE AND PROMETHAZINE HYDROCHLORIDE 15; 6.25 MG/5ML; MG/5ML
5 SYRUP ORAL 4 TIMES DAILY PRN
Qty: 200 ML | Refills: 0 | Status: SHIPPED | OUTPATIENT
Start: 2018-09-27 | End: 2018-10-07

## 2018-09-27 NOTE — PROGRESS NOTES
Subjective   Marina Hugo is a 63 y.o. female.     History of Present Illness   Marina Hugo 63 y.o. female who presents for evaluation of sinus pressure and congestion, sore throat, cough. Symptoms include ear pressure, congestion, sore throat, nasal blockage, dry cough, headache and sinus pain.  Onset of symptoms was 5 days ago, gradually worsening since that time. Patient denies shortness of breath, wheezing.   Evaluation to date: none Treatment to date:  Tylenol.     The following portions of the patient's history were reviewed and updated as appropriate: allergies, current medications, past family history, past medical history, past social history, past surgical history and problem list.    Review of Systems   Constitutional: Positive for fatigue. Negative for chills and fever.   HENT: Positive for congestion, ear pain, postnasal drip, rhinorrhea, sinus pain, sinus pressure and sore throat. Negative for ear discharge.    Respiratory: Positive for cough. Negative for chest tightness, shortness of breath and wheezing.        Objective   Physical Exam   Constitutional: She is oriented to person, place, and time. She appears well-developed and well-nourished.   HENT:   Right Ear: Tympanic membrane, external ear and ear canal normal.   Left Ear: Tympanic membrane, external ear and ear canal normal.   Nose: Right sinus exhibits maxillary sinus tenderness. Right sinus exhibits no frontal sinus tenderness. Left sinus exhibits maxillary sinus tenderness. Left sinus exhibits no frontal sinus tenderness.   Mouth/Throat: Uvula is midline and oropharynx is clear and moist.   Cardiovascular: Normal rate and regular rhythm.    Pulmonary/Chest: Effort normal and breath sounds normal.   Neurological: She is alert and oriented to person, place, and time.   Skin: Skin is warm.   Psychiatric: She has a normal mood and affect. Judgment normal.   Nursing note and vitals reviewed.      Assessment/Plan   Marina was seen today for  laryngitis and sinusitis.    Diagnoses and all orders for this visit:    Acute maxillary sinusitis, recurrence not specified  -     amoxicillin-clavulanate (AUGMENTIN) 875-125 MG per tablet; Take 1 tablet by mouth 2 (Two) Times a Day for 10 days.  -     promethazine-dextromethorphan (PROMETHAZINE-DM) 6.25-15 MG/5ML syrup; Take 5 mL by mouth 4 (Four) Times a Day As Needed for Cough for up to 10 days.

## 2019-01-07 ENCOUNTER — OFFICE VISIT (OUTPATIENT)
Dept: FAMILY MEDICINE CLINIC | Facility: CLINIC | Age: 64
End: 2019-01-07

## 2019-01-07 VITALS
BODY MASS INDEX: 47.46 KG/M2 | TEMPERATURE: 97.8 F | HEIGHT: 64 IN | DIASTOLIC BLOOD PRESSURE: 60 MMHG | SYSTOLIC BLOOD PRESSURE: 120 MMHG | HEART RATE: 100 BPM | WEIGHT: 278 LBS | RESPIRATION RATE: 28 BRPM

## 2019-01-07 DIAGNOSIS — J40 BRONCHITIS: Primary | ICD-10-CM

## 2019-01-07 DIAGNOSIS — Z23 NEED FOR INFLUENZA VACCINATION: ICD-10-CM

## 2019-01-07 PROCEDURE — 90471 IMMUNIZATION ADMIN: CPT | Performed by: NURSE PRACTITIONER

## 2019-01-07 PROCEDURE — 90674 CCIIV4 VAC NO PRSV 0.5 ML IM: CPT | Performed by: NURSE PRACTITIONER

## 2019-01-07 PROCEDURE — 99213 OFFICE O/P EST LOW 20 MIN: CPT | Performed by: NURSE PRACTITIONER

## 2019-01-07 RX ORDER — PREDNISONE 20 MG/1
20 TABLET ORAL DAILY
Qty: 5 TABLET | Refills: 0 | Status: SHIPPED | OUTPATIENT
Start: 2019-01-07 | End: 2019-01-31

## 2019-01-07 NOTE — PROGRESS NOTES
Subjective   Marina Hugo is a 63 y.o. female.     History of Present Illness   Marina Hugo 63 y.o. female who presents for evaluation of acute bronchitis. Symptoms include congestion, post nasal drip, dry cough, exertional SOA, wheezing and chest tightness.  Onset of symptoms was 2 weeks ago, unchanged since that time. Patient denies fever.   Evaluation to date: was seen at the [facility] Treatment to date:  Albuterol MDI, cough drops and tessalon perles.     The following portions of the patient's history were reviewed and updated as appropriate: allergies, current medications, past family history, past medical history, past social history, past surgical history and problem list.    Review of Systems   Constitutional: Positive for fatigue. Negative for chills, fever and unexpected weight change.   HENT: Positive for congestion and rhinorrhea. Negative for ear pain, sinus pressure, sinus pain and sore throat.    Respiratory: Positive for cough. Negative for chest tightness, shortness of breath and wheezing.    Cardiovascular: Negative for chest pain and palpitations.   Psychiatric/Behavioral: Negative for behavioral problems.       Objective   Physical Exam   Constitutional: She is oriented to person, place, and time. She appears well-developed and well-nourished.   HENT:   Right Ear: Tympanic membrane, external ear and ear canal normal.   Left Ear: Tympanic membrane, external ear and ear canal normal.   Nose: Mucosal edema and rhinorrhea present. Right sinus exhibits no maxillary sinus tenderness and no frontal sinus tenderness. Left sinus exhibits no maxillary sinus tenderness and no frontal sinus tenderness.   Mouth/Throat: Uvula is midline and oropharynx is clear and moist.   Cardiovascular: Normal rate and regular rhythm.   Pulmonary/Chest: Effort normal and breath sounds normal.   Neurological: She is alert and oriented to person, place, and time.   Skin: Skin is warm.   Psychiatric: She has a normal  mood and affect. Judgment normal.   Nursing note and vitals reviewed.      Assessment/Plan   Marina was seen today for cough.    Diagnoses and all orders for this visit:    Bronchitis  -     predniSONE (DELTASONE) 20 MG tablet; Take 1 tablet by mouth Daily.    Need for influenza vaccination  -     Flucelvax Quad=>4Years ([id number])    Other orders  -     Cancel: Flucelvax Quad (Vial) =>4 yrs ([id number])

## 2019-01-24 ENCOUNTER — TELEPHONE (OUTPATIENT)
Dept: FAMILY MEDICINE CLINIC | Facility: CLINIC | Age: 64
End: 2019-01-24

## 2019-01-24 DIAGNOSIS — E78.2 MIXED HYPERLIPIDEMIA: ICD-10-CM

## 2019-01-24 RX ORDER — ATORVASTATIN CALCIUM 20 MG/1
20 TABLET, FILM COATED ORAL DAILY
Qty: 30 TABLET | Refills: 0 | Status: SHIPPED | OUTPATIENT
Start: 2019-01-24 | End: 2019-01-31 | Stop reason: SDUPTHER

## 2019-01-24 RX ORDER — ATORVASTATIN CALCIUM 20 MG/1
20 TABLET, FILM COATED ORAL DAILY
Qty: 30 TABLET | Refills: 0 | Status: SHIPPED | OUTPATIENT
Start: 2019-01-24 | End: 2019-01-24 | Stop reason: SDUPTHER

## 2019-01-27 DIAGNOSIS — E11.9 TYPE 2 DIABETES MELLITUS WITHOUT COMPLICATION, WITHOUT LONG-TERM CURRENT USE OF INSULIN (HCC): ICD-10-CM

## 2019-01-28 RX ORDER — METFORMIN HYDROCHLORIDE 500 MG/1
TABLET, EXTENDED RELEASE ORAL
Qty: 120 TABLET | Refills: 0 | OUTPATIENT
Start: 2019-01-28

## 2019-01-29 DIAGNOSIS — E11.9 TYPE 2 DIABETES MELLITUS WITHOUT COMPLICATION, WITHOUT LONG-TERM CURRENT USE OF INSULIN (HCC): ICD-10-CM

## 2019-01-30 RX ORDER — PIOGLITAZONEHYDROCHLORIDE 30 MG/1
TABLET ORAL
Qty: 30 TABLET | Refills: 0 | OUTPATIENT
Start: 2019-01-30

## 2019-01-31 ENCOUNTER — OFFICE VISIT (OUTPATIENT)
Dept: FAMILY MEDICINE CLINIC | Facility: CLINIC | Age: 64
End: 2019-01-31

## 2019-01-31 VITALS
WEIGHT: 278 LBS | TEMPERATURE: 98.8 F | DIASTOLIC BLOOD PRESSURE: 69 MMHG | SYSTOLIC BLOOD PRESSURE: 117 MMHG | HEIGHT: 64 IN | HEART RATE: 88 BPM | RESPIRATION RATE: 16 BRPM | BODY MASS INDEX: 47.46 KG/M2

## 2019-01-31 DIAGNOSIS — K58.9 IRRITABLE BOWEL SYNDROME WITHOUT DIARRHEA: ICD-10-CM

## 2019-01-31 DIAGNOSIS — J45.40 MODERATE PERSISTENT ASTHMA WITHOUT COMPLICATION: ICD-10-CM

## 2019-01-31 DIAGNOSIS — I10 ESSENTIAL HYPERTENSION: ICD-10-CM

## 2019-01-31 DIAGNOSIS — E78.2 MIXED HYPERLIPIDEMIA: ICD-10-CM

## 2019-01-31 DIAGNOSIS — M15.9 PRIMARY OSTEOARTHRITIS INVOLVING MULTIPLE JOINTS: ICD-10-CM

## 2019-01-31 DIAGNOSIS — E11.9 TYPE 2 DIABETES MELLITUS WITHOUT COMPLICATION, WITHOUT LONG-TERM CURRENT USE OF INSULIN (HCC): Primary | ICD-10-CM

## 2019-01-31 PROCEDURE — 99214 OFFICE O/P EST MOD 30 MIN: CPT | Performed by: FAMILY MEDICINE

## 2019-01-31 RX ORDER — VALSARTAN AND HYDROCHLOROTHIAZIDE 320; 25 MG/1; MG/1
1 TABLET, FILM COATED ORAL DAILY
Qty: 30 TABLET | Refills: 5 | Status: SHIPPED | OUTPATIENT
Start: 2019-01-31 | End: 2019-06-03 | Stop reason: SDUPTHER

## 2019-01-31 RX ORDER — GLIMEPIRIDE 4 MG/1
4 TABLET ORAL
Qty: 30 TABLET | Refills: 5 | Status: SHIPPED | OUTPATIENT
Start: 2019-01-31 | End: 2019-06-03 | Stop reason: SDUPTHER

## 2019-01-31 RX ORDER — AMLODIPINE BESYLATE 10 MG/1
10 TABLET ORAL DAILY
Qty: 30 TABLET | Refills: 5 | Status: SHIPPED | OUTPATIENT
Start: 2019-01-31 | End: 2019-06-03 | Stop reason: SDUPTHER

## 2019-01-31 RX ORDER — METFORMIN HYDROCHLORIDE 500 MG/1
TABLET, EXTENDED RELEASE ORAL
Qty: 120 TABLET | Refills: 5 | Status: SHIPPED | OUTPATIENT
Start: 2019-01-31 | End: 2019-06-03 | Stop reason: SDUPTHER

## 2019-01-31 RX ORDER — MELOXICAM 15 MG/1
15 TABLET ORAL DAILY
Qty: 30 TABLET | Refills: 5 | Status: SHIPPED | OUTPATIENT
Start: 2019-01-31 | End: 2019-06-03 | Stop reason: SDUPTHER

## 2019-01-31 RX ORDER — DICYCLOMINE HYDROCHLORIDE 10 MG/1
10 CAPSULE ORAL 2 TIMES DAILY
Qty: 60 CAPSULE | Refills: 5 | Status: SHIPPED | OUTPATIENT
Start: 2019-01-31 | End: 2019-06-03 | Stop reason: SDUPTHER

## 2019-01-31 RX ORDER — ATORVASTATIN CALCIUM 20 MG/1
20 TABLET, FILM COATED ORAL DAILY
Qty: 90 TABLET | Refills: 1 | Status: SHIPPED | OUTPATIENT
Start: 2019-01-31 | End: 2019-06-03 | Stop reason: SDUPTHER

## 2019-01-31 RX ORDER — PIOGLITAZONEHYDROCHLORIDE 30 MG/1
30 TABLET ORAL DAILY
Qty: 30 TABLET | Refills: 5 | Status: SHIPPED | OUTPATIENT
Start: 2019-01-31 | End: 2019-06-03 | Stop reason: SDUPTHER

## 2019-01-31 NOTE — PROGRESS NOTES
Subjective   Marina Hugo is a 63 y.o. female.     History of Present Illness     Chief Complaint:   Chief Complaint   Patient presents with   • Diabetes     med refill   • Hyperlipidemia   • Hypertension   • Irritable Bowel Syndrome   • Asthma       Marina Hugo 63 y.o. female who presents today for Medical Management of the below listed issues and medication refills.  she has a problem list of   Patient Active Problem List   Diagnosis   • Hypertension   • Asthma   • Diabetes mellitus (CMS/[facility])   • Hyperlipidemia   • Irritable bowel syndrome   • Osteoarthritis   • Chronic cough   .  Since the last visit, she has overall felt well.  she has been compliant with   Current Outpatient Medications:   •  albuterol (PROAIR HFA) 108 (90 BASE) MCG/ACT inhaler, Inhale 2 puffs Every 6 (Six) Hours As Needed for Wheezing., Disp: 1 inhaler, Rfl: 0  •  amLODIPine (NORVASC) 10 MG tablet, Take 1 tablet by mouth Daily., Disp: 30 tablet, Rfl: 5  •  atorvastatin (LIPITOR) 20 MG tablet, Take 1 tablet by mouth Daily., Disp: 90 tablet, Rfl: 1  •  cetirizine (zyrTEC) 10 MG tablet, Take 10 mg by mouth Daily., Disp: , Rfl:   •  dicyclomine (BENTYL) 10 MG capsule, Take 1 capsule by mouth 2 (Two) Times a Day., Disp: 60 capsule, Rfl: 5  •  Empagliflozin (JARDIANCE) 10 MG tablet, Take 10 mg by mouth Daily., Disp: 30 tablet, Rfl: 5  •  Fluticasone Furoate-Vilanterol (BREO ELLIPTA) 200-25 MCG/INH inhaler, Inhale 1 puff Daily., Disp: 60 each, Rfl: 5  •  glimepiride (AMARYL) 4 MG tablet, Take 1 tablet by mouth Every Morning Before Breakfast., Disp: 30 tablet, Rfl: 5  •  Insulin Pen Needle (NOVOFINE) 32G X 6 MM misc, Use QD with the Victoza Pen, Disp: 100 each, Rfl: 11  •  ipratropium-albuterol (DUONEB) 0.5-2.5 mg/mL nebulizer, Take 3 mL by nebulization Every 4 (Four) Hours As Needed for Wheezing., Disp: 360 mL, Rfl: 5  •  ketotifen (ZADITOR) 0.025 % ophthalmic solution, 1 drop 2 (two) times a day., Disp: , Rfl:   •  Liraglutide (VICTOZA) 18  "MG/3ML solution pen-injector injection, Inject 1.2 mg under the skin into the appropriate area as directed Daily., Disp: 3 mL, Rfl: 5  •  meloxicam (MOBIC) 15 MG tablet, Take 1 tablet by mouth Daily., Disp: 30 tablet, Rfl: 5  •  metFORMIN ER (GLUCOPHAGE-XR) 500 MG 24 hr tablet, Take 4 tablets QAM, Disp: 120 tablet, Rfl: 5  •  pioglitazone (ACTOS) 30 MG tablet, Take 1 tablet by mouth Daily., Disp: 30 tablet, Rfl: 5  •  terbinafine (lamiSIL) 250 MG tablet, Take 250 mg by mouth Daily., Disp: , Rfl:   •  valsartan-hydrochlorothiazide (DIOVAN-HCT) 320-25 MG per tablet, Take 1 tablet by mouth Daily., Disp: 30 tablet, Rfl: 5.  she denies medication side effects.    All of the chronic condition(s) listed above are stable w/o issues.    /69   Pulse 88   Temp 98.8 °F (37.1 °C) (Oral)   Resp 16   Ht 162.6 cm (64\")   Wt 126 kg (278 lb)   BMI 47.72 kg/m²     Results for orders placed or performed in visit on 07/05/18   Comprehensive metabolic panel   Result Value Ref Range    Glucose 131 (H) 65 - 99 mg/dL    BUN 28 (H) 8 - 23 mg/dL    Creatinine 0.97 0.57 - 1.00 mg/dL    eGFR Non African Am 58 (L) >60 mL/min/1.73    eGFR African Am 70 >60 mL/min/1.73    BUN/Creatinine Ratio 28.9 (H) 7.0 - 25.0    Sodium 139 136 - 145 mmol/L    Potassium 4.6 3.5 - 5.2 mmol/L    Chloride 98 98 - 107 mmol/L    Total CO2 25.0 22.0 - 29.0 mmol/L    Calcium 9.8 8.6 - 10.5 mg/dL    Total Protein 7.5 6.0 - 8.5 g/dL    Albumin 4.90 3.50 - 5.20 g/dL    Globulin 2.6 gm/dL    A/G Ratio 1.9 g/dL    Total Bilirubin 0.4 0.1 - 1.2 mg/dL    Alkaline Phosphatase 95 39 - 117 U/L    AST (SGOT) 10 1 - 32 U/L    ALT (SGPT) 12 1 - 33 U/L   Lipid panel   Result Value Ref Range    Total Cholesterol 157 0 - 200 mg/dL    Triglycerides 141 0 - 150 mg/dL    HDL Cholesterol 64 (H) 40 - 60 mg/dL    VLDL Cholesterol 28.2 5 - 40 mg/dL    LDL Cholesterol  65 0 - 100 mg/dL   TSH   Result Value Ref Range    TSH 1.830 0.270 - 4.200 mIU/mL   Hemoglobin A1c   Result Value " Ref Range    Hemoglobin A1C 8.42 (H) 4.80 - 5.60 %   MicroAlbumin, Urine, Random - Urine, Clean Catch   Result Value Ref Range    Microalbumin, Urine <3.0 Not Estab. ug/mL   CBC and Differential   Result Value Ref Range    WBC 11.34 (H) 4.50 - 10.70 10*3/mm3    RBC 4.96 3.90 - 5.20 10*6/mm3    Hemoglobin 11.4 (L) 11.9 - 15.5 g/dL    Hematocrit 39.8 35.6 - 45.5 %    MCV 80.2 (L) 80.5 - 98.2 fL    MCH 23.0 (L) 26.9 - 32.0 pg    MCHC 28.6 (L) 32.4 - 36.3 g/dL    RDW 19.6 (H) 11.7 - 13.0 %    Platelets 475 140 - 500 10*3/mm3    Neutrophil Rel % 70.0 42.7 - 76.0 %    Lymphocyte Rel % 22.0 19.6 - 45.3 %    Monocyte Rel % 5.6 5.0 - 12.0 %    Eosinophil Rel % 1.9 0.3 - 6.2 %    Basophil Rel % 0.5 0.0 - 1.5 %    Neutrophils Absolute 7.93 1.90 - 8.10 10*3/mm3    Lymphocytes Absolute 2.49 0.90 - 4.80 10*3/mm3    Monocytes Absolute 0.64 0.20 - 1.20 10*3/mm3    Eosinophils Absolute 0.22 0.00 - 0.70 10*3/mm3    Basophils Absolute 0.06 0.00 - 0.20 10*3/mm3    Immature Granulocyte Rel % 0.6 (H) 0.0 - 0.5 %    Immature Grans Absolute 0.07 (H) 0.00 - 0.03 10*3/mm3    nRBC 0.0 0.0 - 0.0 /100 WBC           The following portions of the patient's history were reviewed and updated as appropriate: allergies, current medications, past family history, past medical history, past social history, past surgical history and problem list.    Review of Systems   Constitutional: Negative for activity change, chills, fatigue and fever.   Respiratory: Negative for cough and chest tightness.    Cardiovascular: Negative for chest pain and palpitations.   Gastrointestinal: Negative for abdominal pain and nausea.   Endocrine: Negative for cold intolerance.   Psychiatric/Behavioral: Negative for behavioral problems and dysphoric mood.       Objective   Physical Exam   Constitutional: She appears well-developed and well-nourished.   Neck: Neck supple. No thyromegaly present.   Cardiovascular: Normal rate and regular rhythm.   No murmur  heard.  Pulmonary/Chest: Effort normal and breath sounds normal.   Abdominal: Bowel sounds are normal. There is no tenderness.   Neurological: She is alert.   Psychiatric: She has a normal mood and affect. Her behavior is normal.   Nursing note and vitals reviewed.      Assessment/Plan   Marina was seen today for diabetes, hyperlipidemia, hypertension, irritable bowel syndrome and asthma.    Diagnoses and all orders for this visit:    Type 2 diabetes mellitus without complication, without long-term current use of insulin (CMS/[facility])  -     metFORMIN ER (GLUCOPHAGE-XR) 500 MG 24 hr tablet; Take 4 tablets QAM  -     pioglitazone (ACTOS) 30 MG tablet; Take 1 tablet by mouth Daily.  -     Liraglutide (VICTOZA) 18 MG/3ML solution pen-injector injection; Inject 1.2 mg under the skin into the appropriate area as directed Daily.  -     Insulin Pen Needle (NOVOFINE) 32G X 6 MM misc; Use QD with the Victoza Pen  -     glimepiride (AMARYL) 4 MG tablet; Take 1 tablet by mouth Every Morning Before Breakfast.  -     Empagliflozin (JARDIANCE) 10 MG tablet; Take 10 mg by mouth Daily.  -     Hemoglobin A1c  -     Comprehensive Metabolic Panel  -     Lipid Panel    Essential hypertension  -     valsartan-hydrochlorothiazide (DIOVAN-HCT) 320-25 MG per tablet; Take 1 tablet by mouth Daily.  -     amLODIPine (NORVASC) 10 MG tablet; Take 1 tablet by mouth Daily.  -     CBC & Differential  -     Lipid Panel    Primary osteoarthritis involving multiple joints  -     meloxicam (MOBIC) 15 MG tablet; Take 1 tablet by mouth Daily.    Moderate persistent asthma without complication  -     Fluticasone Furoate-Vilanterol (BREO ELLIPTA) 200-25 MCG/INH inhaler; Inhale 1 puff Daily.    Irritable bowel syndrome without diarrhea  -     dicyclomine (BENTYL) 10 MG capsule; Take 1 capsule by mouth 2 (Two) Times a Day.    Mixed hyperlipidemia  -     atorvastatin (LIPITOR) 20 MG tablet; Take 1 tablet by mouth Daily.  -     Lipid Panel

## 2019-02-27 DIAGNOSIS — E78.2 MIXED HYPERLIPIDEMIA: ICD-10-CM

## 2019-02-27 RX ORDER — ATORVASTATIN CALCIUM 20 MG/1
TABLET, FILM COATED ORAL
Qty: 30 TABLET | Refills: 1 | Status: SHIPPED | OUTPATIENT
Start: 2019-02-27 | End: 2019-04-08

## 2019-03-29 ENCOUNTER — OFFICE VISIT (OUTPATIENT)
Dept: FAMILY MEDICINE CLINIC | Facility: CLINIC | Age: 64
End: 2019-03-29

## 2019-03-29 VITALS
HEIGHT: 64 IN | OXYGEN SATURATION: 98 % | SYSTOLIC BLOOD PRESSURE: 97 MMHG | RESPIRATION RATE: 20 BRPM | HEART RATE: 81 BPM | WEIGHT: 279 LBS | BODY MASS INDEX: 47.63 KG/M2 | DIASTOLIC BLOOD PRESSURE: 52 MMHG | TEMPERATURE: 97 F

## 2019-03-29 DIAGNOSIS — J01.00 ACUTE MAXILLARY SINUSITIS, RECURRENCE NOT SPECIFIED: Primary | ICD-10-CM

## 2019-03-29 PROCEDURE — 99213 OFFICE O/P EST LOW 20 MIN: CPT | Performed by: NURSE PRACTITIONER

## 2019-03-29 RX ORDER — AMOXICILLIN AND CLAVULANATE POTASSIUM 875; 125 MG/1; MG/1
1 TABLET, FILM COATED ORAL EVERY 12 HOURS
COMMUNITY
Start: 2019-03-26 | End: 2019-04-05

## 2019-03-29 RX ORDER — PREDNISONE 20 MG/1
20 TABLET ORAL 2 TIMES DAILY
Qty: 10 TABLET | Refills: 0 | Status: SHIPPED | OUTPATIENT
Start: 2019-03-29 | End: 2019-04-03

## 2019-03-29 NOTE — PROGRESS NOTES
Subjective   Marina Hugo is a 63 y.o. female.     History of Present Illness   Marina Hugo 63 y.o. female who presents for evaluation of sinus pressure and congestion. Symptoms include ear pressure, congestion, sore throat, nasal blockage, productive cough and sinus pain.  Onset of symptoms was 5 days ago, gradually worsening since that time. Patient denies shortness of breath, wheezing.   Evaluation to date: was seen at the [facility] Treatment to date:  augmentin . She was told she had strep, sinusitis and bilateral otitis media.  She started the medication on Tuesday.      The following portions of the patient's history were reviewed and updated as appropriate: allergies, current medications, past family history, past medical history, past social history, past surgical history and problem list.    Review of Systems   Constitutional: Positive for chills and fatigue. Negative for fever.   HENT: Positive for congestion, ear pain, postnasal drip, rhinorrhea, sinus pressure and sinus pain.    Respiratory: Positive for cough. Negative for chest tightness, shortness of breath and wheezing.        Objective   Physical Exam   Constitutional: She is oriented to person, place, and time. She appears well-developed and well-nourished.   HENT:   Right Ear: External ear and ear canal normal.   Left Ear: External ear and ear canal normal.   Nose: Right sinus exhibits maxillary sinus tenderness. Right sinus exhibits no frontal sinus tenderness. Left sinus exhibits maxillary sinus tenderness. Left sinus exhibits no frontal sinus tenderness.   Mouth/Throat: Uvula is midline and oropharynx is clear and moist.   TMs dull bilaterally    Cardiovascular: Normal rate and regular rhythm.   Pulmonary/Chest: Effort normal and breath sounds normal.   Neurological: She is alert and oriented to person, place, and time.   Skin: Skin is warm.   Psychiatric: She has a normal mood and affect. Judgment normal.   Nursing note and vitals  reviewed.      Assessment/Plan   Marina was seen today for earache and sore throat.    Diagnoses and all orders for this visit:    Acute maxillary sinusitis, recurrence not specified  -     predniSONE (DELTASONE) 20 MG tablet; Take 1 tablet by mouth 2 (Two) Times a Day for 5 days.

## 2019-04-08 ENCOUNTER — OFFICE VISIT (OUTPATIENT)
Dept: FAMILY MEDICINE CLINIC | Facility: CLINIC | Age: 64
End: 2019-04-08

## 2019-04-08 VITALS
BODY MASS INDEX: 47.63 KG/M2 | RESPIRATION RATE: 16 BRPM | HEIGHT: 64 IN | OXYGEN SATURATION: 98 % | SYSTOLIC BLOOD PRESSURE: 113 MMHG | TEMPERATURE: 97.8 F | WEIGHT: 279 LBS | DIASTOLIC BLOOD PRESSURE: 71 MMHG | HEART RATE: 88 BPM

## 2019-04-08 DIAGNOSIS — J45.40 MODERATE PERSISTENT ASTHMA WITHOUT COMPLICATION: ICD-10-CM

## 2019-04-08 DIAGNOSIS — J40 BRONCHITIS: Primary | ICD-10-CM

## 2019-04-08 PROCEDURE — 99213 OFFICE O/P EST LOW 20 MIN: CPT | Performed by: FAMILY MEDICINE

## 2019-04-08 RX ORDER — AZITHROMYCIN 250 MG/1
TABLET, FILM COATED ORAL
Qty: 6 TABLET | Refills: 1 | Status: SHIPPED | OUTPATIENT
Start: 2019-04-08 | End: 2019-06-25

## 2019-04-08 NOTE — PROGRESS NOTES
"Subjective   Marina Hugo is a 63 y.o. female.     CC: Management of Sinusitis    History of Present Illness     Pt returns after seeing Allie on 3/29. That visit was as follows:    Marina Hugo 63 y.o. female who presents for evaluation of sinus pressure and congestion. Symptoms include ear pressure, congestion, sore throat, nasal blockage, productive cough and sinus pain.  Onset of symptoms was 5 days ago, gradually worsening since that time. Patient denies shortness of breath, wheezing.   Evaluation to date: was seen at the [facility] Treatment to date:  augmentin . She was told she had strep, sinusitis and bilateral otitis media.  She started the medication on Tuesday.      Acute maxillary sinusitis, recurrence not specified  -     predniSONE (DELTASONE) 20 MG tablet; Take 1 tablet by mouth 2 (Two) Times a Day for 5 days.    Sadly, she is still having some sinus p/p, ear pressure, and cough, although the sinuses are better yet the cough worsening.    The following portions of the patient's history were reviewed and updated as appropriate: allergies, current medications, past family history, past medical history, past social history, past surgical history and problem list.    Review of Systems   Constitutional: Negative for activity change, chills, fatigue and fever.   HENT: Positive for congestion. Negative for sinus pressure.    Respiratory: Positive for cough. Negative for chest tightness.    Cardiovascular: Negative for chest pain and palpitations.   Gastrointestinal: Negative for abdominal pain and nausea.   Endocrine: Negative for cold intolerance.   Psychiatric/Behavioral: Negative for behavioral problems and dysphoric mood.     /71   Pulse 88   Temp 97.8 °F (36.6 °C) (Oral)   Resp 16   Ht 162.6 cm (64\")   Wt 127 kg (279 lb)   SpO2 98%   BMI 47.89 kg/m²     Objective   Physical Exam   Constitutional: She appears well-developed and well-nourished.   HENT:   Right Ear: Tympanic membrane " normal.   Left Ear: Tympanic membrane normal.   Nose: Right sinus exhibits no maxillary sinus tenderness and no frontal sinus tenderness. Left sinus exhibits no maxillary sinus tenderness and no frontal sinus tenderness.   Neck: Neck supple. No thyromegaly present.   Cardiovascular: Normal rate and regular rhythm.   No murmur heard.  Pulmonary/Chest: Effort normal and breath sounds normal.   Lymphadenopathy:     She has no cervical adenopathy.   Psychiatric: She has a normal mood and affect. Her behavior is normal.   Nursing note and vitals reviewed.      Assessment/Plan   Marina was seen today for uri, sinusitis, sore throat, cough and asthma.    Diagnoses and all orders for this visit:    Bronchitis  -     azithromycin (ZITHROMAX Z-MCKAY) 250 MG tablet; Take 2 tablets the first day, then 1 tablet daily for 4 days.    Moderate persistent asthma without complication  -     Fluticasone Furoate-Vilanterol (BREO ELLIPTA) 200-25 MCG/INH inhaler; Inhale 1 puff Daily.

## 2019-05-07 DIAGNOSIS — J40 BRONCHITIS: ICD-10-CM

## 2019-05-08 RX ORDER — BENZONATATE 200 MG/1
CAPSULE ORAL
Qty: 30 CAPSULE | Refills: 0 | OUTPATIENT
Start: 2019-05-08

## 2019-05-09 ENCOUNTER — TELEPHONE (OUTPATIENT)
Dept: FAMILY MEDICINE CLINIC | Facility: CLINIC | Age: 64
End: 2019-05-09

## 2019-05-09 DIAGNOSIS — J45.909 UNCOMPLICATED ASTHMA, UNSPECIFIED ASTHMA SEVERITY, UNSPECIFIED WHETHER PERSISTENT: Primary | ICD-10-CM

## 2019-05-10 DIAGNOSIS — E78.2 MIXED HYPERLIPIDEMIA: ICD-10-CM

## 2019-05-12 DIAGNOSIS — E11.9 TYPE 2 DIABETES MELLITUS WITHOUT COMPLICATION, WITHOUT LONG-TERM CURRENT USE OF INSULIN (HCC): ICD-10-CM

## 2019-05-13 RX ORDER — ATORVASTATIN CALCIUM 20 MG/1
TABLET, FILM COATED ORAL
Qty: 30 TABLET | Refills: 0 | Status: SHIPPED | OUTPATIENT
Start: 2019-05-13 | End: 2019-08-23 | Stop reason: SDUPTHER

## 2019-05-13 RX ORDER — LIRAGLUTIDE 6 MG/ML
INJECTION SUBCUTANEOUS
Qty: 3 ML | Refills: 0 | Status: SHIPPED | OUTPATIENT
Start: 2019-05-13 | End: 2019-06-03 | Stop reason: SDUPTHER

## 2019-05-14 DIAGNOSIS — E78.2 MIXED HYPERLIPIDEMIA: ICD-10-CM

## 2019-05-14 RX ORDER — ATORVASTATIN CALCIUM 20 MG/1
TABLET, FILM COATED ORAL
Qty: 30 TABLET | Refills: 3 | Status: SHIPPED | OUTPATIENT
Start: 2019-05-14 | End: 2019-08-05

## 2019-06-03 ENCOUNTER — OFFICE VISIT (OUTPATIENT)
Dept: FAMILY MEDICINE CLINIC | Facility: CLINIC | Age: 64
End: 2019-06-03

## 2019-06-03 VITALS
SYSTOLIC BLOOD PRESSURE: 120 MMHG | BODY MASS INDEX: 50.02 KG/M2 | HEART RATE: 82 BPM | HEIGHT: 64 IN | DIASTOLIC BLOOD PRESSURE: 60 MMHG | OXYGEN SATURATION: 97 % | WEIGHT: 293 LBS | RESPIRATION RATE: 16 BRPM

## 2019-06-03 DIAGNOSIS — E11.9 TYPE 2 DIABETES MELLITUS WITHOUT COMPLICATION, WITHOUT LONG-TERM CURRENT USE OF INSULIN (HCC): ICD-10-CM

## 2019-06-03 DIAGNOSIS — M15.9 PRIMARY OSTEOARTHRITIS INVOLVING MULTIPLE JOINTS: ICD-10-CM

## 2019-06-03 DIAGNOSIS — I10 ESSENTIAL HYPERTENSION: ICD-10-CM

## 2019-06-03 DIAGNOSIS — E78.2 MIXED HYPERLIPIDEMIA: ICD-10-CM

## 2019-06-03 DIAGNOSIS — K58.9 IRRITABLE BOWEL SYNDROME WITHOUT DIARRHEA: ICD-10-CM

## 2019-06-03 PROCEDURE — 99214 OFFICE O/P EST MOD 30 MIN: CPT | Performed by: NURSE PRACTITIONER

## 2019-06-03 RX ORDER — ATORVASTATIN CALCIUM 20 MG/1
20 TABLET, FILM COATED ORAL DAILY
Qty: 90 TABLET | Refills: 1 | Status: CANCELLED | OUTPATIENT
Start: 2019-06-03

## 2019-06-03 RX ORDER — METFORMIN HYDROCHLORIDE 500 MG/1
TABLET, EXTENDED RELEASE ORAL
Qty: 120 TABLET | Refills: 5 | Status: SHIPPED | OUTPATIENT
Start: 2019-06-03 | End: 2019-08-17 | Stop reason: SDUPTHER

## 2019-06-03 RX ORDER — BENZONATATE 200 MG/1
200 CAPSULE ORAL 3 TIMES DAILY PRN
Qty: 30 CAPSULE | Refills: 5 | Status: SHIPPED | OUTPATIENT
Start: 2019-06-03 | End: 2019-08-05

## 2019-06-03 RX ORDER — PIOGLITAZONEHYDROCHLORIDE 30 MG/1
30 TABLET ORAL DAILY
Qty: 30 TABLET | Refills: 5 | Status: SHIPPED | OUTPATIENT
Start: 2019-06-03 | End: 2019-08-17 | Stop reason: SDUPTHER

## 2019-06-03 RX ORDER — DICYCLOMINE HYDROCHLORIDE 10 MG/1
10 CAPSULE ORAL 2 TIMES DAILY
Qty: 60 CAPSULE | Refills: 5 | Status: SHIPPED | OUTPATIENT
Start: 2019-06-03 | End: 2019-08-23 | Stop reason: SDUPTHER

## 2019-06-03 RX ORDER — ATORVASTATIN CALCIUM 20 MG/1
20 TABLET, FILM COATED ORAL DAILY
Qty: 90 TABLET | Refills: 1 | Status: SHIPPED | OUTPATIENT
Start: 2019-06-03 | End: 2019-08-05

## 2019-06-03 RX ORDER — AMLODIPINE BESYLATE 10 MG/1
10 TABLET ORAL DAILY
Qty: 30 TABLET | Refills: 5 | Status: SHIPPED | OUTPATIENT
Start: 2019-06-03 | End: 2019-08-17 | Stop reason: SDUPTHER

## 2019-06-03 RX ORDER — MELOXICAM 15 MG/1
15 TABLET ORAL DAILY
Qty: 30 TABLET | Refills: 5 | Status: SHIPPED | OUTPATIENT
Start: 2019-06-03 | End: 2019-08-23 | Stop reason: SDUPTHER

## 2019-06-03 RX ORDER — VALSARTAN AND HYDROCHLOROTHIAZIDE 320; 25 MG/1; MG/1
1 TABLET, FILM COATED ORAL DAILY
Qty: 30 TABLET | Refills: 5 | Status: SHIPPED | OUTPATIENT
Start: 2019-06-03 | End: 2019-08-17 | Stop reason: SDUPTHER

## 2019-06-03 RX ORDER — GLIMEPIRIDE 4 MG/1
4 TABLET ORAL
Qty: 30 TABLET | Refills: 5 | Status: SHIPPED | OUTPATIENT
Start: 2019-06-03 | End: 2019-08-23 | Stop reason: SDUPTHER

## 2019-06-03 NOTE — PROGRESS NOTES
Subjective   Marina Hugo is a 63 y.o. female.     History of Present Illness   Marina Hugo 63 y.o. female who presents today for routine follow up check and medication refills.  she has a history of   Patient Active Problem List   Diagnosis   • Hypertension   • Asthma   • Diabetes mellitus (CMS/HCC)   • Hyperlipidemia   • Irritable bowel syndrome   • Osteoarthritis   • Chronic cough   .  Since the last visit, she has overall felt well.  She has Hypertenision and is well controlled on medication. She has hyperlipidemia and type 2 DM and is due for labs to assess control.   she has been compliant with current medications have reviewed them.  The patient denies medication side effects.    Results for orders placed or performed in visit on 07/05/18   Comprehensive metabolic panel   Result Value Ref Range    Glucose 131 (H) 65 - 99 mg/dL    BUN 28 (H) 8 - 23 mg/dL    Creatinine 0.97 0.57 - 1.00 mg/dL    eGFR Non African Am 58 (L) >60 mL/min/1.73    eGFR African Am 70 >60 mL/min/1.73    BUN/Creatinine Ratio 28.9 (H) 7.0 - 25.0    Sodium 139 136 - 145 mmol/L    Potassium 4.6 3.5 - 5.2 mmol/L    Chloride 98 98 - 107 mmol/L    Total CO2 25.0 22.0 - 29.0 mmol/L    Calcium 9.8 8.6 - 10.5 mg/dL    Total Protein 7.5 6.0 - 8.5 g/dL    Albumin 4.90 3.50 - 5.20 g/dL    Globulin 2.6 gm/dL    A/G Ratio 1.9 g/dL    Total Bilirubin 0.4 0.1 - 1.2 mg/dL    Alkaline Phosphatase 95 39 - 117 U/L    AST (SGOT) 10 1 - 32 U/L    ALT (SGPT) 12 1 - 33 U/L   Lipid panel   Result Value Ref Range    Total Cholesterol 157 0 - 200 mg/dL    Triglycerides 141 0 - 150 mg/dL    HDL Cholesterol 64 (H) 40 - 60 mg/dL    VLDL Cholesterol 28.2 5 - 40 mg/dL    LDL Cholesterol  65 0 - 100 mg/dL   TSH   Result Value Ref Range    TSH 1.830 0.270 - 4.200 mIU/mL   Hemoglobin A1c   Result Value Ref Range    Hemoglobin A1C 8.42 (H) 4.80 - 5.60 %   MicroAlbumin, Urine, Random - Urine, Clean Catch   Result Value Ref Range    Microalbumin, Urine <3.0 Not Estab.  ug/mL   CBC and Differential   Result Value Ref Range    WBC 11.34 (H) 4.50 - 10.70 10*3/mm3    RBC 4.96 3.90 - 5.20 10*6/mm3    Hemoglobin 11.4 (L) 11.9 - 15.5 g/dL    Hematocrit 39.8 35.6 - 45.5 %    MCV 80.2 (L) 80.5 - 98.2 fL    MCH 23.0 (L) 26.9 - 32.0 pg    MCHC 28.6 (L) 32.4 - 36.3 g/dL    RDW 19.6 (H) 11.7 - 13.0 %    Platelets 475 140 - 500 10*3/mm3    Neutrophil Rel % 70.0 42.7 - 76.0 %    Lymphocyte Rel % 22.0 19.6 - 45.3 %    Monocyte Rel % 5.6 5.0 - 12.0 %    Eosinophil Rel % 1.9 0.3 - 6.2 %    Basophil Rel % 0.5 0.0 - 1.5 %    Neutrophils Absolute 7.93 1.90 - 8.10 10*3/mm3    Lymphocytes Absolute 2.49 0.90 - 4.80 10*3/mm3    Monocytes Absolute 0.64 0.20 - 1.20 10*3/mm3    Eosinophils Absolute 0.22 0.00 - 0.70 10*3/mm3    Basophils Absolute 0.06 0.00 - 0.20 10*3/mm3    Immature Granulocyte Rel % 0.6 (H) 0.0 - 0.5 %    Immature Grans Absolute 0.07 (H) 0.00 - 0.03 10*3/mm3    nRBC 0.0 0.0 - 0.0 /100 WBC         The following portions of the patient's history were reviewed and updated as appropriate: allergies, current medications, past family history, past medical history, past social history, past surgical history and problem list.    Review of Systems   Constitutional: Negative for unexpected weight change.   Respiratory: Negative for shortness of breath.    Cardiovascular: Negative for chest pain and palpitations.   Endocrine: Negative for cold intolerance, heat intolerance, polydipsia, polyphagia and polyuria.   Psychiatric/Behavioral: Negative for behavioral problems.       Objective   Physical Exam   Constitutional: She is oriented to person, place, and time. She appears well-developed and well-nourished.   Neck: Carotid bruit is not present.   Cardiovascular: Normal rate and regular rhythm.   Pulmonary/Chest: Effort normal and breath sounds normal.   Neurological: She is alert and oriented to person, place, and time.   Psychiatric: She has a normal mood and affect. Judgment normal.   Nursing note  and vitals reviewed.      Assessment/Plan   Marina was seen today for med refill.    Diagnoses and all orders for this visit:    Type 2 diabetes mellitus without complication, without long-term current use of insulin (CMS/[facility])  -     Liraglutide (VICTOZA) 18 MG/3ML solution pen-injector injection; Inject 1.2 mg under the skin into the appropriate area as directed Daily.  -     pioglitazone (ACTOS) 30 MG tablet; Take 1 tablet by mouth Daily.  -     metFORMIN ER (GLUCOPHAGE-XR) 500 MG 24 hr tablet; Take 4 tablets QAM  -     Insulin Pen Needle (NOVOFINE) 32G X 6 MM misc; Use QD with the Victoza Pen  -     glimepiride (AMARYL) 4 MG tablet; Take 1 tablet by mouth Every Morning Before Breakfast.  -     Empagliflozin (JARDIANCE) 10 MG tablet; Take 10 mg by mouth Daily.  -     Comprehensive metabolic panel  -     Lipid panel  -     CBC and Differential  -     TSH  -     Hemoglobin A1c  -     MicroAlbumin, Urine, Random - Urine, Clean Catch    Essential hypertension  -     valsartan-hydrochlorothiazide (DIOVAN-HCT) 320-25 MG per tablet; Take 1 tablet by mouth Daily.  -     amLODIPine (NORVASC) 10 MG tablet; Take 1 tablet by mouth Daily.  -     Comprehensive metabolic panel  -     Lipid panel  -     CBC and Differential  -     TSH  -     Hemoglobin A1c  -     MicroAlbumin, Urine, Random - Urine, Clean Catch    Primary osteoarthritis involving multiple joints  -     meloxicam (MOBIC) 15 MG tablet; Take 1 tablet by mouth Daily.    Irritable bowel syndrome without diarrhea  -     dicyclomine (BENTYL) 10 MG capsule; Take 1 capsule by mouth 2 (Two) Times a Day.    Mixed hyperlipidemia  -     Comprehensive metabolic panel  -     Lipid panel  -     CBC and Differential  -     TSH  -     Hemoglobin A1c  -     MicroAlbumin, Urine, Random - Urine, Clean Catch  -     atorvastatin (LIPITOR) 20 MG tablet; Take 1 tablet by mouth Daily.    Other orders  -     Cancel: atorvastatin (LIPITOR) 20 MG tablet; Take 1 tablet by mouth Daily.  -      benzonatate (TESSALON) 200 MG capsule; Take 1 capsule by mouth 3 (Three) Times a Day As Needed for Cough.        Will get labs within the next 2 weeks.

## 2019-06-25 ENCOUNTER — OFFICE VISIT (OUTPATIENT)
Dept: FAMILY MEDICINE CLINIC | Facility: CLINIC | Age: 64
End: 2019-06-25

## 2019-06-25 VITALS
DIASTOLIC BLOOD PRESSURE: 60 MMHG | HEART RATE: 124 BPM | OXYGEN SATURATION: 99 % | RESPIRATION RATE: 20 BRPM | WEIGHT: 293 LBS | HEIGHT: 64 IN | SYSTOLIC BLOOD PRESSURE: 110 MMHG | BODY MASS INDEX: 50.02 KG/M2 | TEMPERATURE: 98.5 F

## 2019-06-25 DIAGNOSIS — J40 BRONCHITIS: Primary | ICD-10-CM

## 2019-06-25 PROCEDURE — 99213 OFFICE O/P EST LOW 20 MIN: CPT | Performed by: NURSE PRACTITIONER

## 2019-06-25 RX ORDER — AMOXICILLIN AND CLAVULANATE POTASSIUM 875; 125 MG/1; MG/1
1 TABLET, FILM COATED ORAL EVERY 12 HOURS
COMMUNITY
Start: 2019-06-22 | End: 2019-07-02

## 2019-06-25 RX ORDER — DEXTROMETHORPHAN HYDROBROMIDE AND PROMETHAZINE HYDROCHLORIDE 15; 6.25 MG/5ML; MG/5ML
5 SYRUP ORAL 4 TIMES DAILY PRN
Qty: 200 ML | Refills: 0 | Status: SHIPPED | OUTPATIENT
Start: 2019-06-25 | End: 2019-07-05

## 2019-06-25 RX ORDER — BENZONATATE 200 MG/1
200 CAPSULE ORAL DAILY
COMMUNITY
Start: 2019-06-22 | End: 2019-07-06

## 2019-06-25 RX ORDER — PREDNISONE 20 MG/1
20 TABLET ORAL 2 TIMES DAILY
Qty: 14 TABLET | Refills: 0 | Status: SHIPPED | OUTPATIENT
Start: 2019-06-25 | End: 2019-07-02

## 2019-06-25 RX ORDER — AZITHROMYCIN 250 MG/1
TABLET, FILM COATED ORAL
Qty: 6 TABLET | Refills: 0 | Status: SHIPPED | OUTPATIENT
Start: 2019-06-25 | End: 2019-08-05

## 2019-06-25 NOTE — PROGRESS NOTES
Subjective   Marina Hugo is a 63 y.o. female.     History of Present Illness   Marnia Hugo 63 y.o. female who presents for evaluation of sinus pressure and congestion. Symptoms include congestion, sore throat, nasal blockage, post nasal drip, productive cough, sinus pain and chest tightness.  Onset of symptoms was 1 week ago.  She states sinus pain has improved but cough has not. Patient denies fever.   Evaluation to date: [facility]. Treatment to date:  Augmentin and benzonatate .       The following portions of the patient's history were reviewed and updated as appropriate: allergies, current medications, past family history, past medical history, past social history, past surgical history and problem list.    Review of Systems   Constitutional: Positive for fatigue. Negative for chills and fever.   HENT: Positive for congestion, postnasal drip, sinus pressure and sore throat. Negative for ear pain and sinus pain.    Respiratory: Positive for cough and chest tightness. Negative for shortness of breath and wheezing.    Cardiovascular: Negative for chest pain.   Musculoskeletal: Negative for myalgias.       Objective   Physical Exam   Constitutional: She is oriented to person, place, and time. She appears well-developed and well-nourished.   HENT:   Right Ear: Tympanic membrane, external ear and ear canal normal.   Left Ear: Tympanic membrane, external ear and ear canal normal.   Nose: Mucosal edema and rhinorrhea present. Right sinus exhibits no maxillary sinus tenderness and no frontal sinus tenderness. Left sinus exhibits no maxillary sinus tenderness and no frontal sinus tenderness.   Mouth/Throat: Uvula is midline. Posterior oropharyngeal erythema present.   Cardiovascular: Normal rate and regular rhythm.   Pulmonary/Chest: Effort normal. She has wheezes in the left lower field.   Rub noted in LLL   Neurological: She is alert and oriented to person, place, and time.   Skin: Skin is warm.    Psychiatric: She has a normal mood and affect. Judgment normal.   Nursing note and vitals reviewed.      Assessment/Plan   Marina was seen today for cough, fatigue and headache.    Diagnoses and all orders for this visit:    Bronchitis  -     predniSONE (DELTASONE) 20 MG tablet; Take 1 tablet by mouth 2 (Two) Times a Day for 7 days.  -     azithromycin (ZITHROMAX Z-MCKAY) 250 MG tablet; Take 2 tablets the first day, then 1 tablet daily for 4 days.  -     promethazine-dextromethorphan (PROMETHAZINE-DM) 6.25-15 MG/5ML syrup; Take 5 mL by mouth 4 (Four) Times a Day As Needed for Cough for up to 10 days.

## 2019-08-03 LAB
ALBUMIN SERPL-MCNC: 4.7 G/DL (ref 3.5–5.2)
ALBUMIN/GLOB SERPL: 1.8 G/DL
ALP SERPL-CCNC: 83 U/L (ref 39–117)
ALT SERPL-CCNC: 12 U/L (ref 1–33)
AST SERPL-CCNC: 16 U/L (ref 1–32)
BASOPHILS # BLD AUTO: 0.07 10*3/MM3 (ref 0–0.2)
BASOPHILS NFR BLD AUTO: 0.7 % (ref 0–1.5)
BILIRUB SERPL-MCNC: 0.3 MG/DL (ref 0.2–1.2)
BUN SERPL-MCNC: 25 MG/DL (ref 8–23)
BUN/CREAT SERPL: 25.8 (ref 7–25)
CALCIUM SERPL-MCNC: 9.8 MG/DL (ref 8.6–10.5)
CHLORIDE SERPL-SCNC: 100 MMOL/L (ref 98–107)
CHOLEST SERPL-MCNC: 139 MG/DL (ref 0–200)
CO2 SERPL-SCNC: 22.8 MMOL/L (ref 22–29)
CREAT SERPL-MCNC: 0.97 MG/DL (ref 0.57–1)
EOSINOPHIL # BLD AUTO: 0.28 10*3/MM3 (ref 0–0.4)
EOSINOPHIL NFR BLD AUTO: 2.7 % (ref 0.3–6.2)
ERYTHROCYTE [DISTWIDTH] IN BLOOD BY AUTOMATED COUNT: 21.6 % (ref 12.3–15.4)
GLOBULIN SER CALC-MCNC: 2.6 GM/DL
GLUCOSE SERPL-MCNC: 135 MG/DL (ref 65–99)
HBA1C MFR BLD: 8.2 % (ref 4.8–5.6)
HCT VFR BLD AUTO: 37.7 % (ref 34–46.6)
HDLC SERPL-MCNC: 66 MG/DL (ref 40–60)
HGB BLD-MCNC: 10.2 G/DL (ref 12–15.9)
IMM GRANULOCYTES # BLD AUTO: 0.07 10*3/MM3 (ref 0–0.05)
IMM GRANULOCYTES NFR BLD AUTO: 0.7 % (ref 0–0.5)
LDLC SERPL CALC-MCNC: 52 MG/DL (ref 0–100)
LYMPHOCYTES # BLD AUTO: 2.34 10*3/MM3 (ref 0.7–3.1)
LYMPHOCYTES NFR BLD AUTO: 22.8 % (ref 19.6–45.3)
MCH RBC QN AUTO: 22 PG (ref 26.6–33)
MCHC RBC AUTO-ENTMCNC: 27.1 G/DL (ref 31.5–35.7)
MCV RBC AUTO: 81.4 FL (ref 79–97)
MICROALBUMIN UR-MCNC: <3 UG/ML
MONOCYTES # BLD AUTO: 0.79 10*3/MM3 (ref 0.1–0.9)
MONOCYTES NFR BLD AUTO: 7.7 % (ref 5–12)
NEUTROPHILS # BLD AUTO: 6.73 10*3/MM3 (ref 1.7–7)
NEUTROPHILS NFR BLD AUTO: 65.4 % (ref 42.7–76)
NRBC BLD AUTO-RTO: 0 /100 WBC (ref 0–0.2)
PLATELET # BLD AUTO: 528 10*3/MM3 (ref 140–450)
POTASSIUM SERPL-SCNC: 4.9 MMOL/L (ref 3.5–5.2)
PROT SERPL-MCNC: 7.3 G/DL (ref 6–8.5)
RBC # BLD AUTO: 4.63 10*6/MM3 (ref 3.77–5.28)
SODIUM SERPL-SCNC: 141 MMOL/L (ref 136–145)
TRIGL SERPL-MCNC: 107 MG/DL (ref 0–150)
TSH SERPL DL<=0.005 MIU/L-ACNC: 2.19 MIU/ML (ref 0.27–4.2)
VLDLC SERPL CALC-MCNC: 21.4 MG/DL
WBC # BLD AUTO: 10.28 10*3/MM3 (ref 3.4–10.8)

## 2019-08-05 ENCOUNTER — HOSPITAL ENCOUNTER (EMERGENCY)
Facility: HOSPITAL | Age: 64
Discharge: HOME | End: 2019-08-05
Attending: EMERGENCY MEDICINE | Admitting: EMERGENCY MEDICINE

## 2019-08-05 ENCOUNTER — APPOINTMENT (OUTPATIENT)
Dept: GENERAL RADIOLOGY | Facility: HOSPITAL | Age: 64
End: 2019-08-05

## 2019-08-05 ENCOUNTER — APPOINTMENT (OUTPATIENT)
Dept: CT IMAGING | Facility: HOSPITAL | Age: 64
End: 2019-08-05

## 2019-08-05 VITALS
TEMPERATURE: 98.3 F | SYSTOLIC BLOOD PRESSURE: 172 MMHG | HEIGHT: 64 IN | OXYGEN SATURATION: 98 % | RESPIRATION RATE: 18 BRPM | BODY MASS INDEX: 48.65 KG/M2 | WEIGHT: 285 LBS | HEART RATE: 83 BPM | DIASTOLIC BLOOD PRESSURE: 88 MMHG

## 2019-08-05 DIAGNOSIS — M23.92 INTERNAL DERANGEMENT OF LEFT KNEE: Primary | ICD-10-CM

## 2019-08-05 PROCEDURE — 73560 X-RAY EXAM OF KNEE 1 OR 2: CPT

## 2019-08-05 PROCEDURE — 99284 EMERGENCY DEPT VISIT MOD MDM: CPT

## 2019-08-05 PROCEDURE — 25010000002 HYDROMORPHONE 1 MG/ML SOLUTION: Performed by: EMERGENCY MEDICINE

## 2019-08-05 PROCEDURE — 96372 THER/PROPH/DIAG INJ SC/IM: CPT

## 2019-08-05 PROCEDURE — 73700 CT LOWER EXTREMITY W/O DYE: CPT

## 2019-08-05 RX ORDER — HYDROCODONE BITARTRATE AND ACETAMINOPHEN 7.5; 325 MG/1; MG/1
1 TABLET ORAL EVERY 6 HOURS PRN
Qty: 15 TABLET | Refills: 0 | Status: SHIPPED | OUTPATIENT
Start: 2019-08-05 | End: 2021-03-30

## 2019-08-05 RX ORDER — ONDANSETRON 4 MG/1
4 TABLET, ORALLY DISINTEGRATING ORAL ONCE
Status: COMPLETED | OUTPATIENT
Start: 2019-08-05 | End: 2019-08-05

## 2019-08-05 RX ORDER — OXYCODONE HYDROCHLORIDE AND ACETAMINOPHEN 5; 325 MG/1; MG/1
1 TABLET ORAL ONCE
Status: COMPLETED | OUTPATIENT
Start: 2019-08-05 | End: 2019-08-05

## 2019-08-05 RX ADMIN — OXYCODONE HYDROCHLORIDE AND ACETAMINOPHEN 1 TABLET: 5; 325 TABLET ORAL at 09:37

## 2019-08-05 RX ADMIN — ONDANSETRON 4 MG: 4 TABLET, ORALLY DISINTEGRATING ORAL at 10:45

## 2019-08-05 RX ADMIN — HYDROMORPHONE HYDROCHLORIDE 1 MG: 1 INJECTION, SOLUTION INTRAMUSCULAR; INTRAVENOUS; SUBCUTANEOUS at 10:45

## 2019-08-05 NOTE — DISCHARGE INSTRUCTIONS
Continue to use your Mobic at home.  Use the pain medicine as needed for pain.  Do follow-up with your orthopedist.  Call today for an appointment.  Ice and elevate your left knee when at rest.  Use the walker to help you ambulate and weight-bear as tolerated.

## 2019-08-05 NOTE — ED PROVIDER NOTES
" EMERGENCY DEPARTMENT ENCOUNTER    CHIEF COMPLAINT  Chief Complaint: left knee injury  History given by: patient  History limited by: none  Room Number:   PMD: Dwayne Allen MD      HPI:  Pt is a 64 y.o. female who presents complaining of left knee injury that occurred PTA. Pt states that when she woke up both of her knees were sore/stiff and that when she got out of the car at work she twist it. Pt states \"I went to get out of the car and my knee did not go with me.\" Pt states that she was able to \"hobble\" into the school and that she sat down and when she stood up she was unable to bear weight due to pain. Pt denies prior left knee injuries or any other injuries. Pt is on Mobic. Family at bedside.         Duration:  PTA  Onset: gradual  Timing: constant  Location: left knee  Radiation: none  Quality: pain  Intensity/Severity: moderate  Progression: unchanged  Associated Symptoms: none  Aggravating Factors: movement, standing  Alleviating Factors: none  Previous Episodes: none  Treatment before arrival: none    PAST MEDICAL HISTORY  Active Ambulatory Problems     Diagnosis Date Noted   • Hypertension 2016   • Asthma 2016   • Diabetes mellitus (CMS/HCC) 2016   • Hyperlipidemia 2016   • Irritable bowel syndrome 2016   • Osteoarthritis 2016   • Chronic cough 2016     Resolved Ambulatory Problems     Diagnosis Date Noted   • No Resolved Ambulatory Problems     Past Medical History:   Diagnosis Date   • Asthma    • Diabetes mellitus (CMS/HCC)    • Hyperlipidemia    • Hypertension    • Irritable bowel syndrome    • Obesity    • Routine eye exam due       PAST SURGICAL HISTORY  Past Surgical History:   Procedure Laterality Date   •  SECTION     • COLONOSCOPY     • LAPAROSCOPIC GASTRIC BANDING         FAMILY HISTORY  Family History   Problem Relation Age of Onset   • Anxiety disorder Mother    • Colon cancer Mother    • Depression Mother    • Diabetes Mother    • " Hypertension Mother    • Nephrolithiasis Mother    • Macular degeneration Mother    • Diabetes Father    • Heart disease Father    • Hypertension Father        SOCIAL HISTORY  Social History     Socioeconomic History   • Marital status:      Spouse name: Not on file   • Number of children: Not on file   • Years of education: Not on file   • Highest education level: Not on file   Tobacco Use   • Smoking status: Never Smoker   • Smokeless tobacco: Never Used   Substance and Sexual Activity   • Alcohol use: No   • Drug use: No       ALLERGIES  Celebrex [celecoxib]; Erythromycin; Promethazine hcl; and Sulfa antibiotics    REVIEW OF SYSTEMS  Review of Systems   Constitutional: Negative for chills and fever.   Respiratory: Negative for cough.    Cardiovascular: Negative for chest pain.   Gastrointestinal: Negative for nausea.   Genitourinary: Negative for dysuria.   Musculoskeletal: Positive for arthralgias (left knee). Negative for back pain.   All other systems reviewed and are negative.      PHYSICAL EXAM  ED Triage Vitals   Temp Heart Rate Resp BP SpO2   08/05/19 0921 08/05/19 0921 08/05/19 0938 08/05/19 0938 08/05/19 0921   98.3 °F (36.8 °C) 103 24 121/78 93 %      Temp src Heart Rate Source Patient Position BP Location FiO2 (%)   08/05/19 0921 08/05/19 0921 -- -- --   Tympanic Monitor            Physical Exam   Constitutional: She is oriented to person, place, and time. She appears distressed (mild).   HENT:   Head: Normocephalic and atraumatic.   Eyes: EOM are normal. Pupils are equal, round, and reactive to light.   Neck: Normal range of motion. Neck supple.   Cardiovascular: Normal rate, regular rhythm and normal heart sounds.   No murmur heard.  Pulmonary/Chest: Effort normal and breath sounds normal. No respiratory distress.   CTAB   Abdominal: Soft. Bowel sounds are normal. She exhibits no distension. There is no tenderness.   Musculoskeletal:   Negative left leg raise  Tenderness over the left patella  and just superior to the left patella  Tenderness of the MCL and LCL on the leg  Pain with flexion of the left knee   Neurological: She is alert and oriented to person, place, and time. She has normal sensation and normal strength.   Skin: Skin is warm and dry. No rash noted.   Psychiatric: Mood and affect normal.   Nursing note and vitals reviewed.          RADIOLOGY  XR Knee 1 or 2 View Left   Final Result   There is narrowing of the medial compartment and  patellofemoral articulation. There is patellar spur formation and  articular irregularity. Small suprapatellar effusion may be present. No  fracture, dislocation or bone lesion.   CT Lower Extremity Bilateral Without Contrast      Small effusion consistent with arthritis, no fracture seen        I ordered the above noted radiological studies. Interpreted by radiologist. Discussed with radiologist (). Reviewed by me in PACS.       PROCEDURES  Procedures      PROGRESS AND CONSULTS     0923-Ordered mague for further evaluation.     0933-Ordered XR left knee for further evaluation. Ordered ice and percocet for left knee pain.     1022-Ordered knee immobilizer for ambulation.    1032-Rechecked pt. Pt is resting but still complains of left knee pain. Notified pt of XR left knee which is negative acute. Discussed with pt that since she is still in pain but I can order a CT of the left knee but from the ED we are unable to obtain a stat MRI of the knee. Discussed the plan to order and review CT left knee. Pt understands and agrees with the plan, all questions answered.    1037-Ordered CT left knee for further evaluation and zofran for nausea and dilaudid for pain.     1134-Per  (radiology), CT left knee shows a small effusion but is negative for fracture and is negative acute.     1140-Rechecked pt. Pt is resting comfortably. Notified pt of CT left knee-negative acute but shows arthritis. Informed pt that this is likely a muscle strain. Discussed the  plan to discharge the pt home with prescriptions for norco. I instructed the pt to f/u with ortho and wear the knee immobilizer and to use walker as needed. Pt understands and agrees with the plan, all questions answered.    1148-Walker ordered.     Bryce [id number] reviewed. BRYCE query complete. Treatment plan to include limited course of prescribed  controlled substance. Risks including addiction, benefits, and alternatives presented to patient.       MEDICAL DECISION MAKING  Results were reviewed/discussed with the patient and they were also made aware of online access. Pt also made aware that some labs, such as cultures, will not be resulted during ER visit and follow up with PMD is necessary.     MDM  Number of Diagnoses or Management Options     Amount and/or Complexity of Data Reviewed  Tests in the radiology section of CPT®:  reviewed and ordered (CT left knee-arthritic findings  XR left knee-negative acute)  Discussion of test results with the performing providers: yes ()  Decide to obtain previous medical records or to obtain history from someone other than the patient: yes   Independent visualization of images, tracings, or specimens: yes           DIAGNOSIS  Final diagnoses:   Internal derangement of left knee       DISPOSITION  DISCHARGE    Patient discharged in stable condition.    Reviewed implications of results, diagnosis, meds, responsibility to follow up, warning signs and symptoms of possible worsening, potential complications and reasons to return to ER.    Patient/Family voiced understanding of above instructions.    Discussed plan for discharge, as there is no emergent indication for admission. Patient referred to primary care provider for BP management due to today's BP. Pt/family is agreeable and understands need for follow up and repeat testing.  Pt is aware that discharge does not mean that nothing is wrong but it indicates no emergency is present that requires admission and they  must continue care with follow-up as given below or physician of their choice.     FOLLOW-UP  Lorraine Calzada MD  [address]  [facility] [address]  [facility] [id number] [phone]    Schedule an appointment as soon as possible for a visit       Dwayne Allen MD  [address] [facility] [address]  [facility] [id number] [phone]      As needed         Medication List      No changes were made to your prescriptions during this visit.         Latest Documented Vital Signs:  As of 11:49 AM  BP- 140/99 HR- 83 Temp- 98.3 °F (36.8 °C) (Tympanic) O2 sat- 99%    --  Documentation assistance provided by odell Lugo for MD Isabella.  Information recorded by the scribe was done at my direction and has been verified and validated by me.               Indira Lugo  08/05/19 [id number]    Faheem Gonzalez MD  08/05/19 1642

## 2019-08-05 NOTE — ED TRIAGE NOTES
Pt was getting out of car and work and twisted left knee. Pt reports she is unable to put weight on left leg due to severe pain.

## 2019-08-15 ENCOUNTER — OFFICE VISIT (OUTPATIENT)
Dept: FAMILY MEDICINE CLINIC | Facility: CLINIC | Age: 64
End: 2019-08-15

## 2019-08-15 VITALS
WEIGHT: 285 LBS | HEIGHT: 64 IN | SYSTOLIC BLOOD PRESSURE: 120 MMHG | BODY MASS INDEX: 48.65 KG/M2 | RESPIRATION RATE: 20 BRPM | DIASTOLIC BLOOD PRESSURE: 60 MMHG | OXYGEN SATURATION: 98 % | HEART RATE: 89 BPM

## 2019-08-15 DIAGNOSIS — E11.9 TYPE 2 DIABETES MELLITUS WITHOUT COMPLICATION, WITHOUT LONG-TERM CURRENT USE OF INSULIN (HCC): Primary | ICD-10-CM

## 2019-08-15 DIAGNOSIS — S83.242D TEAR OF MEDIAL MENISCUS OF LEFT KNEE, CURRENT, UNSPECIFIED TEAR TYPE, SUBSEQUENT ENCOUNTER: ICD-10-CM

## 2019-08-15 PROCEDURE — 99213 OFFICE O/P EST LOW 20 MIN: CPT | Performed by: NURSE PRACTITIONER

## 2019-08-15 NOTE — PROGRESS NOTES
Subjective   Marina Hugo is a 64 y.o. female.     History of Present Illness   Patient presents to office to discuss lab results.  She has hx of type 2 DM and had recent A1c increase to 8.2.  Patient states she has not been taking medications regularly.  She states she missed a few weeks of her diabetes medications.  She would like to have A1c repeated in 3 months before changing medication.       Patient had MRI of left knee on Monday and appt with Dr. Calzada today.  She has torn left medial meniscus.  She has f/u scheduled with Dr. Calzada next week for hydrogel injection. She states her pain was initially 10 out of 10 when she initially injured it 1 week ago.  She states pain is 1-2 currently.   The following portions of the patient's history were reviewed and updated as appropriate: allergies, current medications, past family history, past medical history, past social history, past surgical history and problem list.    Review of Systems   Constitutional: Negative for unexpected weight change.   Respiratory: Negative for shortness of breath.    Cardiovascular: Negative for chest pain and palpitations.   Musculoskeletal: Positive for arthralgias, gait problem and joint swelling.   Psychiatric/Behavioral: Negative for behavioral problems.       Objective   Physical Exam   Constitutional: She is oriented to person, place, and time. She appears well-developed and well-nourished.   Pulmonary/Chest: Effort normal.   Neurological: She is alert and oriented to person, place, and time.   Psychiatric: She has a normal mood and affect. Judgment normal.   Nursing note and vitals reviewed.      Assessment/Plan   Marina was seen today for review labs.    Diagnoses and all orders for this visit:    Type 2 diabetes mellitus without complication, without long-term current use of insulin (CMS/[facility])  -     Hemoglobin A1c; Future    Tear of medial meniscus of left knee, current, unspecified tear type, subsequent  encounter    No change in medications per patient request.   Repeat A1c in 3 months.

## 2019-08-17 DIAGNOSIS — E11.9 TYPE 2 DIABETES MELLITUS WITHOUT COMPLICATION, WITHOUT LONG-TERM CURRENT USE OF INSULIN (HCC): ICD-10-CM

## 2019-08-17 DIAGNOSIS — I10 ESSENTIAL HYPERTENSION: ICD-10-CM

## 2019-08-19 RX ORDER — VALSARTAN AND HYDROCHLOROTHIAZIDE 320; 25 MG/1; MG/1
TABLET, FILM COATED ORAL
Qty: 30 TABLET | Refills: 0 | Status: SHIPPED | OUTPATIENT
Start: 2019-08-19 | End: 2019-08-23 | Stop reason: SDUPTHER

## 2019-08-19 RX ORDER — AMLODIPINE BESYLATE 10 MG/1
TABLET ORAL
Qty: 30 TABLET | Refills: 0 | Status: SHIPPED | OUTPATIENT
Start: 2019-08-19 | End: 2019-08-23 | Stop reason: SDUPTHER

## 2019-08-19 RX ORDER — PIOGLITAZONEHYDROCHLORIDE 30 MG/1
30 TABLET ORAL DAILY
Qty: 30 TABLET | Refills: 0 | Status: SHIPPED | OUTPATIENT
Start: 2019-08-19 | End: 2019-08-23 | Stop reason: SDUPTHER

## 2019-08-19 RX ORDER — METFORMIN HYDROCHLORIDE 500 MG/1
TABLET, EXTENDED RELEASE ORAL
Qty: 120 TABLET | Refills: 0 | Status: SHIPPED | OUTPATIENT
Start: 2019-08-19 | End: 2019-08-23 | Stop reason: SDUPTHER

## 2019-08-19 RX ORDER — EMPAGLIFLOZIN 10 MG/1
TABLET, FILM COATED ORAL
Qty: 30 TABLET | Refills: 0 | Status: SHIPPED | OUTPATIENT
Start: 2019-08-19 | End: 2019-08-23 | Stop reason: SDUPTHER

## 2019-08-23 DIAGNOSIS — E11.9 TYPE 2 DIABETES MELLITUS WITHOUT COMPLICATION, WITHOUT LONG-TERM CURRENT USE OF INSULIN (HCC): ICD-10-CM

## 2019-08-23 DIAGNOSIS — E78.2 MIXED HYPERLIPIDEMIA: ICD-10-CM

## 2019-08-23 DIAGNOSIS — K58.9 IRRITABLE BOWEL SYNDROME WITHOUT DIARRHEA: ICD-10-CM

## 2019-08-23 DIAGNOSIS — I10 ESSENTIAL HYPERTENSION: ICD-10-CM

## 2019-08-23 DIAGNOSIS — M15.9 PRIMARY OSTEOARTHRITIS INVOLVING MULTIPLE JOINTS: ICD-10-CM

## 2019-08-23 RX ORDER — DICYCLOMINE HYDROCHLORIDE 10 MG/1
10 CAPSULE ORAL 2 TIMES DAILY
Qty: 180 CAPSULE | Refills: 1 | Status: SHIPPED | OUTPATIENT
Start: 2019-08-23 | End: 2020-01-27 | Stop reason: SDUPTHER

## 2019-08-23 RX ORDER — PIOGLITAZONEHYDROCHLORIDE 30 MG/1
30 TABLET ORAL DAILY
Qty: 90 TABLET | Refills: 1 | Status: SHIPPED | OUTPATIENT
Start: 2019-08-23 | End: 2020-01-27 | Stop reason: SDUPTHER

## 2019-08-23 RX ORDER — VALSARTAN AND HYDROCHLOROTHIAZIDE 320; 25 MG/1; MG/1
1 TABLET, FILM COATED ORAL DAILY
Qty: 90 TABLET | Refills: 1 | Status: SHIPPED | OUTPATIENT
Start: 2019-08-23 | End: 2020-01-27 | Stop reason: SDUPTHER

## 2019-08-23 RX ORDER — AMLODIPINE BESYLATE 10 MG/1
10 TABLET ORAL DAILY
Qty: 90 TABLET | Refills: 1 | Status: SHIPPED | OUTPATIENT
Start: 2019-08-23 | End: 2020-01-27 | Stop reason: SDUPTHER

## 2019-08-23 RX ORDER — GLIMEPIRIDE 4 MG/1
4 TABLET ORAL
Qty: 90 TABLET | Refills: 1 | Status: SHIPPED | OUTPATIENT
Start: 2019-08-23 | End: 2019-08-23 | Stop reason: SDUPTHER

## 2019-08-23 RX ORDER — ATORVASTATIN CALCIUM 20 MG/1
20 TABLET, FILM COATED ORAL DAILY
Qty: 90 TABLET | Refills: 1 | Status: SHIPPED | OUTPATIENT
Start: 2019-08-23 | End: 2020-01-27 | Stop reason: SDUPTHER

## 2019-08-23 RX ORDER — METFORMIN HYDROCHLORIDE 500 MG/1
TABLET, EXTENDED RELEASE ORAL
Qty: 360 TABLET | Refills: 1 | Status: SHIPPED | OUTPATIENT
Start: 2019-08-23 | End: 2020-01-27 | Stop reason: SDUPTHER

## 2019-08-23 RX ORDER — MELOXICAM 15 MG/1
15 TABLET ORAL DAILY
Qty: 30 TABLET | Refills: 5 | Status: SHIPPED | OUTPATIENT
Start: 2019-08-23 | End: 2020-01-27 | Stop reason: SDUPTHER

## 2019-08-26 RX ORDER — GLIMEPIRIDE 4 MG/1
TABLET ORAL
Qty: 30 TABLET | Refills: 5 | Status: SHIPPED | OUTPATIENT
Start: 2019-08-26 | End: 2020-01-27 | Stop reason: SDUPTHER

## 2019-09-26 DIAGNOSIS — E11.9 TYPE 2 DIABETES MELLITUS WITHOUT COMPLICATION, WITHOUT LONG-TERM CURRENT USE OF INSULIN (HCC): ICD-10-CM

## 2019-09-26 RX ORDER — EMPAGLIFLOZIN 10 MG/1
TABLET, FILM COATED ORAL
Qty: 90 TABLET | Refills: 0 | Status: SHIPPED | OUTPATIENT
Start: 2019-09-26 | End: 2019-12-20 | Stop reason: SDUPTHER

## 2019-11-01 ENCOUNTER — RESULTS ENCOUNTER (OUTPATIENT)
Dept: FAMILY MEDICINE CLINIC | Facility: CLINIC | Age: 64
End: 2019-11-01

## 2019-11-01 DIAGNOSIS — E11.9 TYPE 2 DIABETES MELLITUS WITHOUT COMPLICATION, WITHOUT LONG-TERM CURRENT USE OF INSULIN (HCC): ICD-10-CM

## 2019-12-20 DIAGNOSIS — E11.9 TYPE 2 DIABETES MELLITUS WITHOUT COMPLICATION, WITHOUT LONG-TERM CURRENT USE OF INSULIN (HCC): ICD-10-CM

## 2020-01-20 ENCOUNTER — CLINICAL SUPPORT (OUTPATIENT)
Dept: FAMILY MEDICINE CLINIC | Facility: CLINIC | Age: 65
End: 2020-01-20

## 2020-01-20 DIAGNOSIS — Z23 NEED FOR INFLUENZA VACCINATION: ICD-10-CM

## 2020-01-20 DIAGNOSIS — Z23 IMMUNIZATION DUE: Primary | ICD-10-CM

## 2020-01-20 PROCEDURE — 90653 IIV ADJUVANT VACCINE IM: CPT | Performed by: FAMILY MEDICINE

## 2020-01-20 PROCEDURE — 90471 IMMUNIZATION ADMIN: CPT | Performed by: FAMILY MEDICINE

## 2020-01-27 ENCOUNTER — OFFICE VISIT (OUTPATIENT)
Dept: FAMILY MEDICINE CLINIC | Facility: CLINIC | Age: 65
End: 2020-01-27

## 2020-01-27 VITALS
HEIGHT: 64 IN | DIASTOLIC BLOOD PRESSURE: 75 MMHG | HEART RATE: 94 BPM | OXYGEN SATURATION: 96 % | BODY MASS INDEX: 47.8 KG/M2 | TEMPERATURE: 98.4 F | WEIGHT: 280 LBS | SYSTOLIC BLOOD PRESSURE: 114 MMHG | RESPIRATION RATE: 16 BRPM

## 2020-01-27 DIAGNOSIS — E78.2 MIXED HYPERLIPIDEMIA: ICD-10-CM

## 2020-01-27 DIAGNOSIS — J40 BRONCHITIS: ICD-10-CM

## 2020-01-27 DIAGNOSIS — M15.9 PRIMARY OSTEOARTHRITIS INVOLVING MULTIPLE JOINTS: ICD-10-CM

## 2020-01-27 DIAGNOSIS — I10 ESSENTIAL HYPERTENSION: ICD-10-CM

## 2020-01-27 DIAGNOSIS — K58.9 IRRITABLE BOWEL SYNDROME WITHOUT DIARRHEA: ICD-10-CM

## 2020-01-27 DIAGNOSIS — E11.9 TYPE 2 DIABETES MELLITUS WITHOUT COMPLICATION, WITHOUT LONG-TERM CURRENT USE OF INSULIN (HCC): Primary | ICD-10-CM

## 2020-01-27 PROCEDURE — 99214 OFFICE O/P EST MOD 30 MIN: CPT | Performed by: FAMILY MEDICINE

## 2020-01-27 RX ORDER — AZITHROMYCIN 250 MG/1
TABLET, FILM COATED ORAL
Qty: 6 TABLET | Refills: 0 | Status: SHIPPED | OUTPATIENT
Start: 2020-01-27 | End: 2020-03-03

## 2020-01-27 RX ORDER — VALSARTAN AND HYDROCHLOROTHIAZIDE 320; 25 MG/1; MG/1
1 TABLET, FILM COATED ORAL DAILY
Qty: 90 TABLET | Refills: 1 | Status: SHIPPED | OUTPATIENT
Start: 2020-01-27 | End: 2020-09-14

## 2020-01-27 RX ORDER — MELOXICAM 15 MG/1
15 TABLET ORAL DAILY
Qty: 90 TABLET | Refills: 1 | Status: SHIPPED | OUTPATIENT
Start: 2020-01-27 | End: 2020-09-14 | Stop reason: SDUPTHER

## 2020-01-27 RX ORDER — ATORVASTATIN CALCIUM 20 MG/1
20 TABLET, FILM COATED ORAL DAILY
Qty: 90 TABLET | Refills: 1 | Status: SHIPPED | OUTPATIENT
Start: 2020-01-27 | End: 2020-03-05

## 2020-01-27 RX ORDER — METFORMIN HYDROCHLORIDE 500 MG/1
TABLET, EXTENDED RELEASE ORAL
Qty: 360 TABLET | Refills: 1 | Status: SHIPPED | OUTPATIENT
Start: 2020-01-27 | End: 2020-09-14 | Stop reason: SDUPTHER

## 2020-01-27 RX ORDER — PIOGLITAZONEHYDROCHLORIDE 30 MG/1
30 TABLET ORAL DAILY
Qty: 90 TABLET | Refills: 1 | Status: SHIPPED | OUTPATIENT
Start: 2020-01-27 | End: 2020-09-14 | Stop reason: SDUPTHER

## 2020-01-27 RX ORDER — GLIMEPIRIDE 4 MG/1
4 TABLET ORAL
Qty: 90 TABLET | Refills: 1 | Status: SHIPPED | OUTPATIENT
Start: 2020-01-27 | End: 2020-09-14 | Stop reason: SDUPTHER

## 2020-01-27 RX ORDER — AMLODIPINE BESYLATE 10 MG/1
10 TABLET ORAL DAILY
Qty: 90 TABLET | Refills: 1 | Status: SHIPPED | OUTPATIENT
Start: 2020-01-27 | End: 2020-09-14 | Stop reason: SDUPTHER

## 2020-01-27 RX ORDER — DICYCLOMINE HYDROCHLORIDE 10 MG/1
10 CAPSULE ORAL 2 TIMES DAILY
Qty: 180 CAPSULE | Refills: 1 | Status: SHIPPED | OUTPATIENT
Start: 2020-01-27 | End: 2020-09-14 | Stop reason: SDUPTHER

## 2020-01-27 NOTE — PROGRESS NOTES
Subjective   Marina Hugo is a 64 y.o. female.     History of Present Illness     Chief Complaint:   Chief Complaint   Patient presents with   • Nasal Congestion     x  2 days  -    • Cough   • Diabetes   • Hypertension   • Hyperlipidemia   • Asthma       Marina Hugo 64 y.o. female who presents today for Medical Management of the below listed issues and medication refills.  she has a problem list of   Patient Active Problem List   Diagnosis   • Hypertension   • Asthma   • Diabetes mellitus (CMS/[facility])   • Hyperlipidemia   • Irritable bowel syndrome   • Osteoarthritis   • Chronic cough   .  Since the last visit, she has overall felt well until this past weekend, when she developed increasing coughing (dry) w/o f/c/wheezing/blood sputum. No sinus p/p. she has been compliant with   Current Outpatient Medications:   •  albuterol (PROAIR HFA) 108 (90 BASE) MCG/ACT inhaler, Inhale 2 puffs Every 6 (Six) Hours As Needed for Wheezing., Disp: 1 inhaler, Rfl: 0  •  amLODIPine (NORVASC) 10 MG tablet, Take 1 tablet by mouth Daily., Disp: 90 tablet, Rfl: 1  •  atorvastatin (LIPITOR) 20 MG tablet, Take 1 tablet by mouth Daily., Disp: 90 tablet, Rfl: 1  •  azithromycin (ZITHROMAX Z-MCKAY) 250 MG tablet, Take 2 tablets the first day, then 1 tablet daily for 4 days., Disp: 6 tablet, Rfl: 0  •  cetirizine (zyrTEC) 10 MG tablet, Take 10 mg by mouth Daily., Disp: , Rfl:   •  dicyclomine (BENTYL) 10 MG capsule, Take 1 capsule by mouth 2 (Two) Times a Day., Disp: 180 capsule, Rfl: 1  •  Empagliflozin (JARDIANCE) 10 MG tablet, Take 10 mg by mouth Daily., Disp: 90 tablet, Rfl: 1  •  Fluticasone Furoate-Vilanterol (BREO ELLIPTA) 200-25 MCG/INH inhaler, Inhale 1 puff Daily., Disp: , Rfl:   •  glimepiride (AMARYL) 4 MG tablet, Take 1 tablet by mouth Every Morning Before Breakfast., Disp: 90 tablet, Rfl: 1  •  HYDROcodone-acetaminophen (NORCO) 7.5-325 MG per tablet, Take 1 tablet by mouth Every 6 (Six) Hours As Needed for Moderate Pain .,  "Disp: 15 tablet, Rfl: 0  •  Insulin Pen Needle (NOVOFINE) 32G X 6 MM misc, Use QD with the Victoza Pen, Disp: 100 each, Rfl: 11  •  Liraglutide (VICTOZA) 18 MG/3ML solution pen-injector injection, Inject 1.2 mg under the skin into the appropriate area as directed Daily., Disp: 3 pen, Rfl: 1  •  meloxicam (MOBIC) 15 MG tablet, Take 1 tablet by mouth Daily., Disp: 90 tablet, Rfl: 1  •  metFORMIN ER (GLUCOPHAGE-XR) 500 MG 24 hr tablet, TAKE 4 TABLETS BY MOUTH EVERY MORNING, Disp: 360 tablet, Rfl: 1  •  pioglitazone (ACTOS) 30 MG tablet, Take 1 tablet by mouth Daily., Disp: 90 tablet, Rfl: 1  •  valsartan-hydrochlorothiazide (DIOVAN-HCT) 320-25 MG per tablet, Take 1 tablet by mouth Daily., Disp: 90 tablet, Rfl: 1.  she denies medication side effects.    All of the other chronic condition(s) listed above are stable w/o issues.    /75   Pulse 94   Temp 98.4 °F (36.9 °C) (Oral)   Resp 16   Ht 162.6 cm (64\")   Wt 127 kg (280 lb)   SpO2 96%   BMI 48.06 kg/m²     Results for orders placed or performed in visit on 06/03/19   Comprehensive metabolic panel   Result Value Ref Range    Glucose 135 (H) 65 - 99 mg/dL    BUN 25 (H) 8 - 23 mg/dL    Creatinine 0.97 0.57 - 1.00 mg/dL    eGFR Non African Am 57 (L) >60 mL/min/1.73    eGFR African Am 70 >60 mL/min/1.73    BUN/Creatinine Ratio 25.8 (H) 7.0 - 25.0    Sodium 141 136 - 145 mmol/L    Potassium 4.9 3.5 - 5.2 mmol/L    Chloride 100 98 - 107 mmol/L    Total CO2 22.8 22.0 - 29.0 mmol/L    Calcium 9.8 8.6 - 10.5 mg/dL    Total Protein 7.3 6.0 - 8.5 g/dL    Albumin 4.70 3.50 - 5.20 g/dL    Globulin 2.6 gm/dL    A/G Ratio 1.8 g/dL    Total Bilirubin 0.3 0.2 - 1.2 mg/dL    Alkaline Phosphatase 83 39 - 117 U/L    AST (SGOT) 16 1 - 32 U/L    ALT (SGPT) 12 1 - 33 U/L   Lipid panel   Result Value Ref Range    Total Cholesterol 139 0 - 200 mg/dL    Triglycerides 107 0 - 150 mg/dL    HDL Cholesterol 66 (H) 40 - 60 mg/dL    VLDL Cholesterol 21.4 mg/dL    LDL Cholesterol  52 0 - " 100 mg/dL   TSH   Result Value Ref Range    TSH 2.190 0.270 - 4.200 mIU/mL   Hemoglobin A1c   Result Value Ref Range    Hemoglobin A1C 8.20 (H) 4.80 - 5.60 %   MicroAlbumin, Urine, Random - Urine, Clean Catch   Result Value Ref Range    Microalbumin, Urine <3.0 Not Estab. ug/mL   CBC and Differential   Result Value Ref Range    WBC 10.28 3.40 - 10.80 10*3/mm3    RBC 4.63 3.77 - 5.28 10*6/mm3    Hemoglobin 10.2 (L) 12.0 - 15.9 g/dL    Hematocrit 37.7 34.0 - 46.6 %    MCV 81.4 79.0 - 97.0 fL    MCH 22.0 (L) 26.6 - 33.0 pg    MCHC 27.1 (L) 31.5 - 35.7 g/dL    RDW 21.6 (H) 12.3 - 15.4 %    Platelets 528 (H) 140 - 450 10*3/mm3    Neutrophil Rel % 65.4 42.7 - 76.0 %    Lymphocyte Rel % 22.8 19.6 - 45.3 %    Monocyte Rel % 7.7 5.0 - 12.0 %    Eosinophil Rel % 2.7 0.3 - 6.2 %    Basophil Rel % 0.7 0.0 - 1.5 %    Neutrophils Absolute 6.73 1.70 - 7.00 10*3/mm3    Lymphocytes Absolute 2.34 0.70 - 3.10 10*3/mm3    Monocytes Absolute 0.79 0.10 - 0.90 10*3/mm3    Eosinophils Absolute 0.28 0.00 - 0.40 10*3/mm3    Basophils Absolute 0.07 0.00 - 0.20 10*3/mm3    Immature Granulocyte Rel % 0.7 (H) 0.0 - 0.5 %    Immature Grans Absolute 0.07 (H) 0.00 - 0.05 10*3/mm3    nRBC 0.0 0.0 - 0.2 /100 WBC           The following portions of the patient's history were reviewed and updated as appropriate: allergies, current medications, past family history, past medical history, past social history, past surgical history and problem list.    Review of Systems   Constitutional: Negative for activity change, chills, fatigue and fever.   Respiratory: Negative for cough and chest tightness.    Cardiovascular: Negative for chest pain and palpitations.   Gastrointestinal: Negative for abdominal pain and nausea.   Endocrine: Negative for cold intolerance.   Psychiatric/Behavioral: Negative for behavioral problems and dysphoric mood.       Objective   Physical Exam   Constitutional: She appears well-developed and well-nourished.   Neck: Neck supple. No  thyromegaly present.   Cardiovascular: Normal rate and regular rhythm.   No murmur heard.  Pulmonary/Chest: Effort normal and breath sounds normal.   Abdominal: Bowel sounds are normal. There is no tenderness.   Neurological: She is alert.   Psychiatric: She has a normal mood and affect. Her behavior is normal.   Nursing note and vitals reviewed.  Lab ordered and pt to complete.    Assessment/Plan   Marina was seen today for nasal congestion, cough, diabetes, hypertension, hyperlipidemia and asthma.    Diagnoses and all orders for this visit:    Type 2 diabetes mellitus without complication, without long-term current use of insulin (CMS/[facility])  -     metFORMIN ER (GLUCOPHAGE-XR) 500 MG 24 hr tablet; TAKE 4 TABLETS BY MOUTH EVERY MORNING  -     pioglitazone (ACTOS) 30 MG tablet; Take 1 tablet by mouth Daily.  -     Liraglutide (VICTOZA) 18 MG/3ML solution pen-injector injection; Inject 1.2 mg under the skin into the appropriate area as directed Daily.  -     glimepiride (AMARYL) 4 MG tablet; Take 1 tablet by mouth Every Morning Before Breakfast.  -     Empagliflozin (JARDIANCE) 10 MG tablet; Take 10 mg by mouth Daily.    Essential hypertension  -     valsartan-hydrochlorothiazide (DIOVAN-HCT) 320-25 MG per tablet; Take 1 tablet by mouth Daily.  -     amLODIPine (NORVASC) 10 MG tablet; Take 1 tablet by mouth Daily.    Primary osteoarthritis involving multiple joints  -     meloxicam (MOBIC) 15 MG tablet; Take 1 tablet by mouth Daily.    Irritable bowel syndrome without diarrhea  -     dicyclomine (BENTYL) 10 MG capsule; Take 1 capsule by mouth 2 (Two) Times a Day.    Mixed hyperlipidemia  -     atorvastatin (LIPITOR) 20 MG tablet; Take 1 tablet by mouth Daily.    Bronchitis  -     azithromycin (ZITHROMAX Z-MCKAY) 250 MG tablet; Take 2 tablets the first day, then 1 tablet daily for 4 days.

## 2020-01-28 LAB — HBA1C MFR BLD: 8.1 % (ref 4.8–5.6)

## 2020-02-06 ENCOUNTER — TELEPHONE (OUTPATIENT)
Dept: FAMILY MEDICINE CLINIC | Facility: CLINIC | Age: 65
End: 2020-02-06

## 2020-02-11 DIAGNOSIS — J40 BRONCHITIS: ICD-10-CM

## 2020-02-12 RX ORDER — METHYLPREDNISOLONE 4 MG/1
TABLET ORAL
Qty: 21 TABLET | Refills: 0 | Status: SHIPPED | OUTPATIENT
Start: 2020-02-12 | End: 2020-09-10

## 2020-02-12 RX ORDER — BROMPHENIRAMINE MALEATE, PSEUDOEPHEDRINE HYDROCHLORIDE, AND DEXTROMETHORPHAN HYDROBROMIDE 2; 30; 10 MG/5ML; MG/5ML; MG/5ML
5 SYRUP ORAL 4 TIMES DAILY PRN
Qty: 120 ML | Refills: 0 | Status: SHIPPED | OUTPATIENT
Start: 2020-02-12 | End: 2020-09-14

## 2020-02-14 DIAGNOSIS — E11.9 TYPE 2 DIABETES MELLITUS WITHOUT COMPLICATION, WITHOUT LONG-TERM CURRENT USE OF INSULIN (HCC): ICD-10-CM

## 2020-02-17 RX ORDER — PEN NEEDLE, DIABETIC 32 GX 1/4"
NEEDLE, DISPOSABLE MISCELLANEOUS
Qty: 100 EACH | Refills: 11 | Status: SHIPPED | OUTPATIENT
Start: 2020-02-17 | End: 2020-11-06

## 2020-03-03 ENCOUNTER — OFFICE VISIT (OUTPATIENT)
Dept: FAMILY MEDICINE CLINIC | Facility: CLINIC | Age: 65
End: 2020-03-03

## 2020-03-03 VITALS
HEIGHT: 64 IN | RESPIRATION RATE: 18 BRPM | SYSTOLIC BLOOD PRESSURE: 92 MMHG | DIASTOLIC BLOOD PRESSURE: 61 MMHG | WEIGHT: 280 LBS | BODY MASS INDEX: 47.8 KG/M2 | OXYGEN SATURATION: 95 % | HEART RATE: 96 BPM | TEMPERATURE: 98.6 F

## 2020-03-03 DIAGNOSIS — J45.41 MODERATE PERSISTENT ASTHMA WITH ACUTE EXACERBATION: ICD-10-CM

## 2020-03-03 DIAGNOSIS — D64.9 ANEMIA, UNSPECIFIED TYPE: ICD-10-CM

## 2020-03-03 DIAGNOSIS — E11.65 TYPE 2 DIABETES MELLITUS WITH HYPERGLYCEMIA, WITHOUT LONG-TERM CURRENT USE OF INSULIN (HCC): Primary | ICD-10-CM

## 2020-03-03 DIAGNOSIS — J40 BRONCHITIS: ICD-10-CM

## 2020-03-03 PROCEDURE — 99214 OFFICE O/P EST MOD 30 MIN: CPT | Performed by: FAMILY MEDICINE

## 2020-03-03 RX ORDER — AZITHROMYCIN 250 MG/1
TABLET, FILM COATED ORAL
Qty: 6 TABLET | Refills: 0 | Status: SHIPPED | OUTPATIENT
Start: 2020-03-03 | End: 2020-03-17

## 2020-03-03 RX ORDER — BENZONATATE 200 MG/1
CAPSULE ORAL
COMMUNITY
Start: 2020-02-10 | End: 2020-09-14

## 2020-03-03 NOTE — PROGRESS NOTES
"Subjective   Marina Hugo is a 64 y.o. female.     CC: Management of DM/Bronchitis    History of Present Illness     Pt returns today after recent A1C was shown to still be > 8.0 and asked to to change her Victoza TO Tresiba for better control. Pt declined and rather wished to discuss this with me before changing. She had also bee treated with a Z-Pack for a recurring Bronchitis issue 1/27/20 and is continuing to cough/wheeze (with coughing). Using the Breo QD and using the rescue inhaler.     She had also bee recommended to f/u with GI when labs from 8/19 showed worsening anemia issues yet has not completed this AMA.    The following portions of the patient's history were reviewed and updated as appropriate: allergies, current medications, past family history, past medical history, past social history, past surgical history and problem list.    Review of Systems   Constitutional: Negative for activity change, chills, fatigue and fever.   Respiratory: Positive for cough and wheezing. Negative for chest tightness.    Cardiovascular: Negative for chest pain and palpitations.   Gastrointestinal: Negative for abdominal pain and nausea.   Endocrine: Negative for cold intolerance.   Psychiatric/Behavioral: Negative for behavioral problems and dysphoric mood.       BP 92/61   Pulse 96   Temp 98.6 °F (37 °C) (Oral)   Resp 18   Ht 162.6 cm (64\")   Wt 127 kg (280 lb)   SpO2 95%   BMI 48.06 kg/m²     Objective   Physical Exam   Constitutional: She appears well-developed and well-nourished.   Neck: Neck supple. No thyromegaly present.   Cardiovascular: Normal rate and regular rhythm.   No murmur heard.  Pulmonary/Chest: Effort normal. She has wheezes. She has no rales.   Abdominal: Bowel sounds are normal. There is no tenderness.   Neurological: She is alert.   Psychiatric: She has a normal mood and affect. Her behavior is normal.   Nursing note and vitals reviewed.      Assessment/Plan   Marina was seen today for " bronchitis, cough and diabetes.    Diagnoses and all orders for this visit:    Type 2 diabetes mellitus with hyperglycemia, without long-term current use of insulin (CMS/[facility])  Comments:  uncontrolled  Orders:  -     insulin degludec (TRESIBA FLEXTOUCH) 100 UNIT/ML solution pen-injector injection; Inject 20 Units under the skin into the appropriate area as directed Daily.  -     Basic Metabolic Panel; Future  -     Lipid Panel; Future  -     Hemoglobin A1c; Future    Anemia, unspecified type  -     Ambulatory Referral to Gastroenterology    Moderate persistent asthma with acute exacerbation  -     tiotropium bromide monohydrate (SPIRIVA RESPIMAT) 2.5 MCG/ACT aerosol solution inhaler; Inhale 2 puffs Daily.    Bronchitis  -     azithromycin (ZITHROMAX Z-MCKAY) 250 MG tablet; Take 2 tablets the first day, then 1 tablet daily for 4 days.

## 2020-03-05 ENCOUNTER — TELEPHONE (OUTPATIENT)
Dept: FAMILY MEDICINE CLINIC | Facility: CLINIC | Age: 65
End: 2020-03-05

## 2020-03-05 DIAGNOSIS — E78.2 MIXED HYPERLIPIDEMIA: ICD-10-CM

## 2020-03-05 RX ORDER — ATORVASTATIN CALCIUM 20 MG/1
TABLET, FILM COATED ORAL
Qty: 90 TABLET | Refills: 0 | Status: SHIPPED | OUTPATIENT
Start: 2020-03-05 | End: 2020-09-14 | Stop reason: SDUPTHER

## 2020-03-09 DIAGNOSIS — E13.9 DIABETES 1.5, MANAGED AS TYPE 2 (HCC): Primary | ICD-10-CM

## 2020-03-17 ENCOUNTER — TELEPHONE (OUTPATIENT)
Dept: FAMILY MEDICINE CLINIC | Facility: CLINIC | Age: 65
End: 2020-03-17

## 2020-03-17 DIAGNOSIS — J06.9 UPPER RESPIRATORY TRACT INFECTION, UNSPECIFIED TYPE: Primary | ICD-10-CM

## 2020-03-17 RX ORDER — LEVOFLOXACIN 500 MG/1
500 TABLET, FILM COATED ORAL DAILY
Qty: 7 TABLET | Refills: 0 | Status: SHIPPED | OUTPATIENT
Start: 2020-03-17 | End: 2020-09-10

## 2020-05-03 ENCOUNTER — RESULTS ENCOUNTER (OUTPATIENT)
Dept: FAMILY MEDICINE CLINIC | Facility: CLINIC | Age: 65
End: 2020-05-03

## 2020-05-03 DIAGNOSIS — E11.65 TYPE 2 DIABETES MELLITUS WITH HYPERGLYCEMIA, WITHOUT LONG-TERM CURRENT USE OF INSULIN (HCC): ICD-10-CM

## 2020-06-05 DIAGNOSIS — E78.2 MIXED HYPERLIPIDEMIA: ICD-10-CM

## 2020-06-05 RX ORDER — ATORVASTATIN CALCIUM 20 MG/1
TABLET, FILM COATED ORAL
Qty: 90 TABLET | Refills: 0 | OUTPATIENT
Start: 2020-06-05

## 2020-09-08 DIAGNOSIS — M15.9 PRIMARY OSTEOARTHRITIS INVOLVING MULTIPLE JOINTS: ICD-10-CM

## 2020-09-08 DIAGNOSIS — E13.9 DIABETES 1.5, MANAGED AS TYPE 2 (HCC): ICD-10-CM

## 2020-09-08 RX ORDER — MELOXICAM 15 MG/1
15 TABLET ORAL DAILY
Qty: 90 TABLET | Refills: 1 | OUTPATIENT
Start: 2020-09-08

## 2020-09-08 RX ORDER — INSULIN GLARGINE 300 U/ML
INJECTION, SOLUTION SUBCUTANEOUS
Qty: 3 ML | OUTPATIENT
Start: 2020-09-08

## 2020-09-14 ENCOUNTER — OFFICE VISIT (OUTPATIENT)
Dept: FAMILY MEDICINE CLINIC | Facility: CLINIC | Age: 65
End: 2020-09-14

## 2020-09-14 VITALS
DIASTOLIC BLOOD PRESSURE: 59 MMHG | HEART RATE: 96 BPM | WEIGHT: 290 LBS | RESPIRATION RATE: 18 BRPM | BODY MASS INDEX: 49.51 KG/M2 | SYSTOLIC BLOOD PRESSURE: 101 MMHG | HEIGHT: 64 IN | TEMPERATURE: 97.9 F

## 2020-09-14 DIAGNOSIS — J45.41 MODERATE PERSISTENT ASTHMA WITH ACUTE EXACERBATION: ICD-10-CM

## 2020-09-14 DIAGNOSIS — K58.9 IRRITABLE BOWEL SYNDROME WITHOUT DIARRHEA: ICD-10-CM

## 2020-09-14 DIAGNOSIS — E11.9 TYPE 2 DIABETES MELLITUS WITHOUT COMPLICATION, WITHOUT LONG-TERM CURRENT USE OF INSULIN (HCC): Primary | ICD-10-CM

## 2020-09-14 DIAGNOSIS — M15.9 PRIMARY OSTEOARTHRITIS INVOLVING MULTIPLE JOINTS: ICD-10-CM

## 2020-09-14 DIAGNOSIS — I10 ESSENTIAL HYPERTENSION: ICD-10-CM

## 2020-09-14 DIAGNOSIS — E78.2 MIXED HYPERLIPIDEMIA: ICD-10-CM

## 2020-09-14 PROCEDURE — 99214 OFFICE O/P EST MOD 30 MIN: CPT | Performed by: FAMILY MEDICINE

## 2020-09-14 RX ORDER — DICYCLOMINE HYDROCHLORIDE 10 MG/1
10 CAPSULE ORAL 2 TIMES DAILY
Qty: 180 CAPSULE | Refills: 1 | Status: SHIPPED | OUTPATIENT
Start: 2020-09-14 | End: 2021-03-30 | Stop reason: SDUPTHER

## 2020-09-14 RX ORDER — DIAZEPAM 5 MG/1
TABLET ORAL
COMMUNITY
Start: 2020-06-12 | End: 2021-03-30

## 2020-09-14 RX ORDER — AMLODIPINE BESYLATE 10 MG/1
10 TABLET ORAL DAILY
Qty: 90 TABLET | Refills: 1 | Status: SHIPPED | OUTPATIENT
Start: 2020-09-14 | End: 2021-03-30 | Stop reason: SDUPTHER

## 2020-09-14 RX ORDER — PIOGLITAZONEHYDROCHLORIDE 30 MG/1
30 TABLET ORAL DAILY
Qty: 90 TABLET | Refills: 1 | Status: SHIPPED | OUTPATIENT
Start: 2020-09-14 | End: 2021-03-30 | Stop reason: SDUPTHER

## 2020-09-14 RX ORDER — METFORMIN HYDROCHLORIDE 500 MG/1
TABLET, EXTENDED RELEASE ORAL
Qty: 360 TABLET | Refills: 1 | Status: SHIPPED | OUTPATIENT
Start: 2020-09-14 | End: 2021-03-30 | Stop reason: SDUPTHER

## 2020-09-14 RX ORDER — GLIMEPIRIDE 4 MG/1
4 TABLET ORAL
Qty: 90 TABLET | Refills: 1 | Status: SHIPPED | OUTPATIENT
Start: 2020-09-14 | End: 2021-03-17 | Stop reason: SDUPTHER

## 2020-09-14 RX ORDER — MELOXICAM 15 MG/1
15 TABLET ORAL DAILY
Qty: 90 TABLET | Refills: 1 | Status: SHIPPED | OUTPATIENT
Start: 2020-09-14 | End: 2021-03-30 | Stop reason: SDUPTHER

## 2020-09-14 RX ORDER — INSULIN GLARGINE 300 U/ML
20 INJECTION, SOLUTION SUBCUTANEOUS DAILY
Qty: 2 PEN | Refills: 3 | Status: SHIPPED | OUTPATIENT
Start: 2020-09-14 | End: 2021-03-30 | Stop reason: SDUPTHER

## 2020-09-14 RX ORDER — TIOTROPIUM BROMIDE INHALATION SPRAY 3.12 UG/1
2 SPRAY, METERED RESPIRATORY (INHALATION)
Qty: 3 INHALER | Refills: 1 | Status: SHIPPED | OUTPATIENT
Start: 2020-09-14 | End: 2021-03-30

## 2020-09-14 RX ORDER — VALSARTAN AND HYDROCHLOROTHIAZIDE 160; 12.5 MG/1; MG/1
1 TABLET, FILM COATED ORAL DAILY
Qty: 90 TABLET | Refills: 1 | Status: SHIPPED | OUTPATIENT
Start: 2020-09-14 | End: 2021-03-30 | Stop reason: SDUPTHER

## 2020-09-14 RX ORDER — VALSARTAN AND HYDROCHLOROTHIAZIDE 320; 25 MG/1; MG/1
1 TABLET, FILM COATED ORAL DAILY
Qty: 90 TABLET | Refills: 1 | Status: CANCELLED | OUTPATIENT
Start: 2020-09-14

## 2020-09-14 RX ORDER — EMPAGLIFLOZIN 10 MG/1
1 TABLET, FILM COATED ORAL DAILY
Qty: 90 TABLET | Refills: 1 | Status: SHIPPED | OUTPATIENT
Start: 2020-09-14 | End: 2021-03-30 | Stop reason: SDUPTHER

## 2020-09-14 RX ORDER — ATORVASTATIN CALCIUM 20 MG/1
20 TABLET, FILM COATED ORAL DAILY
Qty: 90 TABLET | Refills: 1 | Status: SHIPPED | OUTPATIENT
Start: 2020-09-14 | End: 2021-03-19 | Stop reason: SDUPTHER

## 2020-09-14 NOTE — PROGRESS NOTES
Subjective   Marina Hugo is a 65 y.o. female.     History of Present Illness     Chief Complaint:   Chief Complaint   Patient presents with   • Diabetes     med refill  no labs    • Hypertension     diabetic foot exam due    • Hyperlipidemia   • Irritable Bowel Syndrome   • Asthma       Marina Hugo 65 y.o. female who presents today for Medical Management of the below listed issues and medication refills.  she has a problem list of   Patient Active Problem List   Diagnosis   • Hypertension   • Asthma   • Diabetes mellitus (CMS/[facility])   • Hyperlipidemia   • Irritable bowel syndrome   • Osteoarthritis   • Chronic cough   .  Since the last visit, she has overall felt well.  she has been compliant with   Current Outpatient Medications:   •  albuterol (PROAIR HFA) 108 (90 BASE) MCG/ACT inhaler, Inhale 2 puffs Every 6 (Six) Hours As Needed for Wheezing., Disp: 1 inhaler, Rfl: 0  •  amLODIPine (NORVASC) 10 MG tablet, Take 1 tablet by mouth Daily., Disp: 90 tablet, Rfl: 1  •  atorvastatin (LIPITOR) 20 MG tablet, Take 1 tablet by mouth Daily., Disp: 90 tablet, Rfl: 1  •  dicyclomine (BENTYL) 10 MG capsule, Take 1 capsule by mouth 2 (Two) Times a Day., Disp: 180 capsule, Rfl: 1  •  Empagliflozin (Jardiance) 10 MG tablet, Take 10 mg by mouth Daily., Disp: 90 tablet, Rfl: 1  •  Fluticasone Furoate-Vilanterol (Breo Ellipta) 200-25 MCG/INH inhaler, Inhale 1 puff Daily., Disp: 3 inhaler, Rfl: 1  •  glimepiride (AMARYL) 4 MG tablet, Take 1 tablet by mouth Every Morning Before Breakfast., Disp: 90 tablet, Rfl: 1  •  Insulin Glargine, 1 Unit Dial, (Toujeo SoloStar) 300 UNIT/ML solution pen-injector injection, Inject 20 Units under the skin into the appropriate area as directed Daily., Disp: 2 pen, Rfl: 3  •  meloxicam (MOBIC) 15 MG tablet, Take 1 tablet by mouth Daily., Disp: 90 tablet, Rfl: 1  •  metFORMIN ER (GLUCOPHAGE-XR) 500 MG 24 hr tablet, TAKE 4 TABLETS BY MOUTH EVERY MORNING, Disp: 360 tablet, Rfl: 1  •  pioglitazone  "(ACTOS) 30 MG tablet, Take 1 tablet by mouth Daily., Disp: 90 tablet, Rfl: 1  •  BD PEN NEEDLE MICRO U/F 32G X 6 MM misc, USE EVERY DAY WITH THE VICTOZA PEN, Disp: 100 each, Rfl: 11  •  cetirizine (zyrTEC) 10 MG tablet, Take 10 mg by mouth Daily., Disp: , Rfl:   •  diazePAM (VALIUM) 5 MG tablet, , Disp: , Rfl:   •  HYDROcodone-acetaminophen (NORCO) 7.5-325 MG per tablet, Take 1 tablet by mouth Every 6 (Six) Hours As Needed for Moderate Pain ., Disp: 15 tablet, Rfl: 0  •  tiotropium bromide monohydrate (Spiriva Respimat) 2.5 MCG/ACT aerosol solution inhaler, Inhale 2 puffs Daily., Disp: 3 inhaler, Rfl: 1  •  valsartan-hydrochlorothiazide (Diovan HCT) 160-12.5 MG per tablet, Take 1 tablet by mouth Daily., Disp: 90 tablet, Rfl: 1.  she denies medication side effects.    All of the other chronic condition(s) listed above are stable w/o issues.    /59   Pulse 96   Temp 97.9 °F (36.6 °C) (Oral)   Resp 18   Ht 162.6 cm (64\")   Wt 132 kg (290 lb)   BMI 49.78 kg/m²     Results for orders placed or performed in visit on 11/01/19   Hemoglobin A1c    Specimen: Blood   Result Value Ref Range    Hemoglobin A1C 8.1 (H) 4.8 - 5.6 %           The following portions of the patient's history were reviewed and updated as appropriate: allergies, current medications, past family history, past medical history, past social history, past surgical history and problem list.    Review of Systems   Constitutional: Negative for activity change, chills, fatigue and fever.   Respiratory: Negative for cough and chest tightness.    Cardiovascular: Negative for chest pain and palpitations.   Gastrointestinal: Negative for abdominal pain and nausea.   Endocrine: Negative for cold intolerance.   Psychiatric/Behavioral: Negative for behavioral problems and dysphoric mood.       Objective   Physical Exam  Vitals signs and nursing note reviewed.   Constitutional:       Appearance: She is well-developed.   Neck:      Musculoskeletal: Neck " supple.      Thyroid: No thyromegaly.   Cardiovascular:      Rate and Rhythm: Normal rate and regular rhythm.      Pulses:           Dorsalis pedis pulses are 2+ on the right side and 2+ on the left side.        Posterior tibial pulses are 2+ on the right side and 2+ on the left side.      Heart sounds: No murmur.   Pulmonary:      Effort: Pulmonary effort is normal.      Breath sounds: Normal breath sounds.   Abdominal:      General: Bowel sounds are normal.      Tenderness: There is no abdominal tenderness.   Feet:      Right foot:      Protective Sensation: 10 sites tested. 10 sites sensed.      Skin integrity: Skin integrity normal.      Left foot:      Protective Sensation: 10 sites tested. 10 sites sensed.      Skin integrity: Skin integrity normal.   Psychiatric:         Behavior: Behavior normal.         Assessment/Plan   Marina was seen today for diabetes, hypertension, hyperlipidemia, irritable bowel syndrome and asthma.    Diagnoses and all orders for this visit:    Type 2 diabetes mellitus without complication, without long-term current use of insulin (CMS/[facility])  -     metFORMIN ER (GLUCOPHAGE-XR) 500 MG 24 hr tablet; TAKE 4 TABLETS BY MOUTH EVERY MORNING  -     Insulin Glargine, 1 Unit Dial, (Toujeo SoloStar) 300 UNIT/ML solution pen-injector injection; Inject 20 Units under the skin into the appropriate area as directed Daily.  -     pioglitazone (ACTOS) 30 MG tablet; Take 1 tablet by mouth Daily.  -     Empagliflozin (Jardiance) 10 MG tablet; Take 10 mg by mouth Daily.  -     glimepiride (AMARYL) 4 MG tablet; Take 1 tablet by mouth Every Morning Before Breakfast.  -     Comprehensive metabolic panel  -     Lipid panel  -     Hemoglobin A1c  -     MicroAlbumin, Urine, Random - Urine, Clean Catch    Essential hypertension  -     amLODIPine (NORVASC) 10 MG tablet; Take 1 tablet by mouth Daily.  -     Comprehensive metabolic panel  -     Lipid panel  -     CBC and Differential  -     TSH  -      valsartan-hydrochlorothiazide (Diovan HCT) 160-12.5 MG per tablet; Take 1 tablet by mouth Daily.    Irritable bowel syndrome without diarrhea  -     dicyclomine (BENTYL) 10 MG capsule; Take 1 capsule by mouth 2 (Two) Times a Day.    Mixed hyperlipidemia  -     atorvastatin (LIPITOR) 20 MG tablet; Take 1 tablet by mouth Daily.  -     Lipid panel    Primary osteoarthritis involving multiple joints  -     meloxicam (MOBIC) 15 MG tablet; Take 1 tablet by mouth Daily.    Moderate persistent asthma with acute exacerbation  -     Fluticasone Furoate-Vilanterol (Breo Ellipta) 200-25 MCG/INH inhaler; Inhale 1 puff Daily.  -     tiotropium bromide monohydrate (Spiriva Respimat) 2.5 MCG/ACT aerosol solution inhaler; Inhale 2 puffs Daily.

## 2020-10-02 NOTE — PROGRESS NOTES
Subjective   Marina Hugo is a 62 y.o. female.     CC: URI    History of Present Illness     Marina Hugo 62 y.o. female who presents for evaluation of sinus pressure and congestion, cough. Symptoms include congestion and productive cough.  Onset of symptoms was 5 days ago, unchanged since that time. Patient denies shortness of breath, wheezing, hemoptysis.   Evaluation to date: none Treatment to date:  none.       The following portions of the patient's history were reviewed and updated as appropriate: allergies, current medications, past family history, past medical history, past social history, past surgical history and problem list.    Review of Systems   Constitutional: Negative for activity change, chills, fatigue and fever.   HENT: Positive for congestion and sinus pressure.    Respiratory: Positive for cough. Negative for chest tightness.    Cardiovascular: Negative for chest pain and palpitations.   Gastrointestinal: Negative for abdominal pain and nausea.   Endocrine: Negative for cold intolerance.   Psychiatric/Behavioral: Negative for behavioral problems and dysphoric mood.     /72   Pulse 56   Temp 97.6 °F (36.4 °C)   Resp 16   Wt 136 kg (300 lb)   SpO2 99%   BMI 51.49 kg/m²     Objective   Physical Exam   Constitutional: She appears well-developed and well-nourished.   HENT:   Nose: Right sinus exhibits maxillary sinus tenderness. Left sinus exhibits maxillary sinus tenderness.   Neck: Neck supple. No thyromegaly present.   Cardiovascular: Normal rate and regular rhythm.    No murmur heard.  Pulmonary/Chest: Effort normal and breath sounds normal.   Lymphadenopathy:     She has no cervical adenopathy.   Psychiatric: She has a normal mood and affect. Her behavior is normal.   Nursing note and vitals reviewed.      Assessment/Plan   Marina was seen today for cough, nasal congestion and sinusitis.    Diagnoses and all orders for this visit:    Acute non-recurrent maxillary sinusitis  -      amoxicillin-clavulanate (AUGMENTIN) 875-125 MG per tablet; Take 1 tablet by mouth Every 12 (Twelve) Hours.    Bronchitis  -     benzonatate (TESSALON) 200 MG capsule; Take 1 capsule by mouth 3 (Three) Times a Day As Needed for Cough.  -     amoxicillin-clavulanate (AUGMENTIN) 875-125 MG per tablet; Take 1 tablet by mouth Every 12 (Twelve) Hours.                COVID-19 ruled out

## 2020-10-03 ENCOUNTER — FLU SHOT (OUTPATIENT)
Dept: FAMILY MEDICINE CLINIC | Facility: CLINIC | Age: 65
End: 2020-10-03

## 2020-10-03 DIAGNOSIS — Z23 NEED FOR INFLUENZA VACCINATION: ICD-10-CM

## 2020-10-03 PROCEDURE — 90694 VACC AIIV4 NO PRSRV 0.5ML IM: CPT | Performed by: NURSE PRACTITIONER

## 2020-10-03 PROCEDURE — 90471 IMMUNIZATION ADMIN: CPT | Performed by: NURSE PRACTITIONER

## 2020-11-06 RX ORDER — PEN NEEDLE, DIABETIC 32 GX 1/4"
NEEDLE, DISPOSABLE MISCELLANEOUS
Qty: 100 EACH | Refills: 3 | Status: SHIPPED | OUTPATIENT
Start: 2020-11-06 | End: 2021-03-30 | Stop reason: SDUPTHER

## 2020-12-15 ENCOUNTER — TELEPHONE (OUTPATIENT)
Dept: FAMILY MEDICINE CLINIC | Facility: CLINIC | Age: 65
End: 2020-12-15

## 2020-12-15 RX ORDER — BENZONATATE 200 MG/1
200 CAPSULE ORAL 3 TIMES DAILY PRN
Qty: 30 CAPSULE | Refills: 3 | Status: SHIPPED | OUTPATIENT
Start: 2020-12-15 | End: 2021-03-30

## 2020-12-15 NOTE — TELEPHONE ENCOUNTER
PT REQUESTING REIFLL FOR COUGH MEDICATION . REFILLS   2020 THANKS. PT STATED GETTING LABS DONE NEXT WEEK

## 2020-12-21 ENCOUNTER — TELEPHONE (OUTPATIENT)
Dept: FAMILY MEDICINE CLINIC | Facility: CLINIC | Age: 65
End: 2020-12-21

## 2020-12-21 RX ORDER — DEXTROMETHORPHAN HYDROBROMIDE AND PROMETHAZINE HYDROCHLORIDE 15; 6.25 MG/5ML; MG/5ML
SYRUP ORAL
Qty: 100 ML | Refills: 0 | Status: SHIPPED | OUTPATIENT
Start: 2020-12-21 | End: 2021-03-30

## 2020-12-21 NOTE — TELEPHONE ENCOUNTER
Follow up [facility] - pt has a cough has tessalon perles for day , but would like something for night time. Thanks

## 2021-03-17 ENCOUNTER — TELEPHONE (OUTPATIENT)
Dept: FAMILY MEDICINE CLINIC | Facility: CLINIC | Age: 66
End: 2021-03-17

## 2021-03-17 DIAGNOSIS — E11.9 TYPE 2 DIABETES MELLITUS WITHOUT COMPLICATION, WITHOUT LONG-TERM CURRENT USE OF INSULIN (HCC): ICD-10-CM

## 2021-03-17 DIAGNOSIS — E11.9 TYPE 2 DIABETES MELLITUS WITHOUT COMPLICATION, WITHOUT LONG-TERM CURRENT USE OF INSULIN: ICD-10-CM

## 2021-03-17 RX ORDER — GLIMEPIRIDE 4 MG/1
4 TABLET ORAL
Qty: 30 TABLET | Refills: 0 | Status: SHIPPED | OUTPATIENT
Start: 2021-03-17 | End: 2021-03-30 | Stop reason: SDUPTHER

## 2021-03-17 RX ORDER — GLIMEPIRIDE 4 MG/1
4 TABLET ORAL
Qty: 30 TABLET | Refills: 0 | Status: CANCELLED | OUTPATIENT
Start: 2021-03-17

## 2021-03-17 NOTE — TELEPHONE ENCOUNTER
Caller: Marina Hugo    Relationship to patient: Self    Best call back number: [phone]    Patient is needing:     PATIENT CALLED IN STATING SHE NEEDS PRIOR AUTHORIZATION ON HER :   glimepiride (AMARYL) 4 MG tablet    IN ORDER TO GET IT FILLED. 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 THE LABS ARE NOT OPEN IN THE MORNING FOR HER TO COME IN DUE TO HER HAVING TO BE AT SCHOOL EARLY, AND LABS ARE CLOSED ON Saturday.     SHE STATED SPRING BREAK STARTS AT THE END OF NEXT WEEK 03/29/21 AND IS ABLE TO COME IN FOR LABS THEN.     PATIENT STATED SHE IS AWARE SHE NEEDS HER BLOOD WORK DONE BEFORE GETTING ALL HER PRESCRIPTIONS REFILLED BUT IS REQUESTING THAT DR POLLARD GIVES PRIOR AUTHORIZATION ON THE ABOVE MEDICATION TO HOLD HER OVER UNTIL SHE CAN COME IN FOR LABS.    IF ANY QUESTIONS PLEASE CALL AND ADVISE [phone]

## 2021-03-18 RX ORDER — GLIMEPIRIDE 4 MG/1
4 TABLET ORAL
Qty: 90 TABLET | OUTPATIENT
Start: 2021-03-18

## 2021-03-19 DIAGNOSIS — E78.2 MIXED HYPERLIPIDEMIA: ICD-10-CM

## 2021-03-19 RX ORDER — ATORVASTATIN CALCIUM 20 MG/1
20 TABLET, FILM COATED ORAL DAILY
Qty: 30 TABLET | Refills: 0 | Status: SHIPPED | OUTPATIENT
Start: 2021-03-19 | End: 2021-03-22

## 2021-03-19 NOTE — TELEPHONE ENCOUNTER
Caller: Marina Hugo    Relationship: Self    Best call back number: [phone]    Medication needed:   Requested Prescriptions     Pending Prescriptions Disp Refills   • atorvastatin (LIPITOR) 20 MG tablet 90 tablet 1     Sig: Take 1 tablet by mouth Daily.       When do you need the refill by: 03/19/2021    What additional details did the patient provide when requesting the medication: PT IS ON HER LAST PILL OF THIS MEDICATION. SHE IS COMING IN FOR BLOOD WORK SOON, BUT SHE WILL BE OUT OF THIS MEDICATION BEFORE THEN.     Does the patient have less than a 3 day supply:  [x] Yes  [] No    What is the patient's preferred pharmacy: [id number] [address] AT [facility] [phone] [facility] [phone]

## 2021-03-22 RX ORDER — ATORVASTATIN CALCIUM 20 MG/1
20 TABLET, FILM COATED ORAL DAILY
Qty: 30 TABLET | Refills: 0 | Status: SHIPPED | OUTPATIENT
Start: 2021-03-22 | End: 2021-03-30 | Stop reason: SDUPTHER

## 2021-03-26 ENCOUNTER — OUTSIDE FACILITY SERVICE (OUTPATIENT)
Dept: FAMILY MEDICINE CLINIC | Facility: CLINIC | Age: 66
End: 2021-03-26

## 2021-03-26 PROCEDURE — OUTSIDEPOS PR OUTSIDE POS PLACEHOLDER: Performed by: FAMILY MEDICINE

## 2021-03-30 ENCOUNTER — OFFICE VISIT (OUTPATIENT)
Dept: FAMILY MEDICINE CLINIC | Facility: CLINIC | Age: 66
End: 2021-03-30

## 2021-03-30 VITALS
RESPIRATION RATE: 16 BRPM | OXYGEN SATURATION: 95 % | SYSTOLIC BLOOD PRESSURE: 120 MMHG | HEIGHT: 64 IN | HEART RATE: 86 BPM | WEIGHT: 288 LBS | DIASTOLIC BLOOD PRESSURE: 70 MMHG | BODY MASS INDEX: 49.17 KG/M2 | TEMPERATURE: 94.4 F

## 2021-03-30 DIAGNOSIS — K58.9 IRRITABLE BOWEL SYNDROME WITHOUT DIARRHEA: ICD-10-CM

## 2021-03-30 DIAGNOSIS — E78.2 MIXED HYPERLIPIDEMIA: ICD-10-CM

## 2021-03-30 DIAGNOSIS — E11.9 TYPE 2 DIABETES MELLITUS WITHOUT COMPLICATION, WITHOUT LONG-TERM CURRENT USE OF INSULIN (HCC): ICD-10-CM

## 2021-03-30 DIAGNOSIS — I10 ESSENTIAL HYPERTENSION: ICD-10-CM

## 2021-03-30 DIAGNOSIS — D64.9 ANEMIA, UNSPECIFIED TYPE: ICD-10-CM

## 2021-03-30 DIAGNOSIS — M15.9 PRIMARY OSTEOARTHRITIS INVOLVING MULTIPLE JOINTS: ICD-10-CM

## 2021-03-30 DIAGNOSIS — J45.41 MODERATE PERSISTENT ASTHMA WITH ACUTE EXACERBATION: Primary | ICD-10-CM

## 2021-03-30 LAB
ALBUMIN SERPL-MCNC: 4.3 G/DL (ref 3.5–5.2)
ALBUMIN/GLOB SERPL: 1.7 G/DL
ALP SERPL-CCNC: 89 U/L (ref 39–117)
ALT SERPL-CCNC: 12 U/L (ref 1–33)
AST SERPL-CCNC: 12 U/L (ref 1–32)
BASOPHILS # BLD MANUAL: 0.08 10*3/MM3 (ref 0–0.2)
BASOPHILS NFR BLD MANUAL: 1 % (ref 0–1.5)
BILIRUB SERPL-MCNC: 0.4 MG/DL (ref 0–1.2)
BUN SERPL-MCNC: 29 MG/DL (ref 8–23)
BUN/CREAT SERPL: 38.7 (ref 7–25)
CALCIUM SERPL-MCNC: 9.7 MG/DL (ref 8.6–10.5)
CHLORIDE SERPL-SCNC: 103 MMOL/L (ref 98–107)
CHOLEST SERPL-MCNC: 130 MG/DL (ref 0–200)
CO2 SERPL-SCNC: 26.9 MMOL/L (ref 22–29)
CREAT SERPL-MCNC: 0.75 MG/DL (ref 0.57–1)
DIFFERENTIAL COMMENT: ABNORMAL
EOSINOPHIL # BLD MANUAL: 0.41 10*3/MM3 (ref 0–0.4)
EOSINOPHIL NFR BLD MANUAL: 5.1 % (ref 0.3–6.2)
ERYTHROCYTE [DISTWIDTH] IN BLOOD BY AUTOMATED COUNT: 17.9 % (ref 12.3–15.4)
GLOBULIN SER CALC-MCNC: 2.5 GM/DL
GLUCOSE SERPL-MCNC: 114 MG/DL (ref 65–99)
HBA1C MFR BLD: 7.1 % (ref 4.8–5.6)
HCT VFR BLD AUTO: 33.4 % (ref 34–46.6)
HDLC SERPL-MCNC: 67 MG/DL (ref 40–60)
HGB BLD-MCNC: 9.7 G/DL (ref 12–15.9)
LDLC SERPL CALC-MCNC: 50 MG/DL (ref 0–100)
LYMPHOCYTES # BLD MANUAL: 2.1 10*3/MM3 (ref 0.7–3.1)
LYMPHOCYTES NFR BLD MANUAL: 26.3 % (ref 19.6–45.3)
MCH RBC QN AUTO: 21 PG (ref 26.6–33)
MCHC RBC AUTO-ENTMCNC: 29 G/DL (ref 31.5–35.7)
MCV RBC AUTO: 72.3 FL (ref 79–97)
MICROALBUMIN UR-MCNC: 3.7 UG/ML
MONOCYTES # BLD MANUAL: 0.65 10*3/MM3 (ref 0.1–0.9)
MONOCYTES NFR BLD MANUAL: 8.1 % (ref 5–12)
NEUTROPHILS # BLD MANUAL: 4.77 10*3/MM3 (ref 1.7–7)
NEUTROPHILS NFR BLD MANUAL: 59.6 % (ref 42.7–76)
NRBC BLD AUTO-RTO: 0 /100 WBC (ref 0–0.2)
PLATELET # BLD AUTO: 466 10*3/MM3 (ref 140–450)
PLATELET BLD QL SMEAR: ABNORMAL
POTASSIUM SERPL-SCNC: 4.6 MMOL/L (ref 3.5–5.2)
PROT SERPL-MCNC: 6.8 G/DL (ref 6–8.5)
RBC # BLD AUTO: 4.62 10*6/MM3 (ref 3.77–5.28)
RBC MORPH BLD: ABNORMAL
SODIUM SERPL-SCNC: 140 MMOL/L (ref 136–145)
TRIGL SERPL-MCNC: 60 MG/DL (ref 0–150)
TSH SERPL DL<=0.005 MIU/L-ACNC: 1.48 UIU/ML (ref 0.27–4.2)
VLDLC SERPL CALC-MCNC: 13 MG/DL (ref 5–40)
WBC # BLD AUTO: 8 10*3/MM3 (ref 3.4–10.8)

## 2021-03-30 PROCEDURE — 99214 OFFICE O/P EST MOD 30 MIN: CPT | Performed by: NURSE PRACTITIONER

## 2021-03-30 RX ORDER — EMPAGLIFLOZIN 10 MG/1
1 TABLET, FILM COATED ORAL DAILY
Qty: 90 TABLET | Refills: 1 | Status: SHIPPED | OUTPATIENT
Start: 2021-03-30 | End: 2021-10-08

## 2021-03-30 RX ORDER — FERROUS SULFATE 325(65) MG
325 TABLET ORAL DAILY
Qty: 90 TABLET | Refills: 1 | Status: SHIPPED | OUTPATIENT
Start: 2021-03-30 | End: 2021-10-08

## 2021-03-30 RX ORDER — PIOGLITAZONEHYDROCHLORIDE 30 MG/1
30 TABLET ORAL DAILY
Qty: 90 TABLET | Refills: 1 | Status: SHIPPED | OUTPATIENT
Start: 2021-03-30 | End: 2021-10-08

## 2021-03-30 RX ORDER — DICYCLOMINE HYDROCHLORIDE 10 MG/1
10 CAPSULE ORAL 2 TIMES DAILY
Qty: 180 CAPSULE | Refills: 1 | Status: SHIPPED | OUTPATIENT
Start: 2021-03-30 | End: 2021-11-07 | Stop reason: SDUPTHER

## 2021-03-30 RX ORDER — AMLODIPINE BESYLATE 10 MG/1
10 TABLET ORAL DAILY
Qty: 90 TABLET | Refills: 1 | Status: SHIPPED | OUTPATIENT
Start: 2021-03-30 | End: 2021-10-08

## 2021-03-30 RX ORDER — MELOXICAM 15 MG/1
15 TABLET ORAL DAILY
Qty: 90 TABLET | Refills: 1 | Status: SHIPPED | OUTPATIENT
Start: 2021-03-30 | End: 2021-07-26

## 2021-03-30 RX ORDER — ATORVASTATIN CALCIUM 20 MG/1
20 TABLET, FILM COATED ORAL DAILY
Qty: 90 TABLET | Refills: 1 | Status: SHIPPED | OUTPATIENT
Start: 2021-03-30 | End: 2021-10-25

## 2021-03-30 RX ORDER — VALSARTAN AND HYDROCHLOROTHIAZIDE 160; 12.5 MG/1; MG/1
1 TABLET, FILM COATED ORAL DAILY
Qty: 90 TABLET | Refills: 1 | Status: SHIPPED | OUTPATIENT
Start: 2021-03-30 | End: 2021-10-18

## 2021-03-30 RX ORDER — METFORMIN HYDROCHLORIDE 500 MG/1
TABLET, EXTENDED RELEASE ORAL
Qty: 360 TABLET | Refills: 1 | Status: SHIPPED | OUTPATIENT
Start: 2021-03-30 | End: 2021-10-08

## 2021-03-30 RX ORDER — GLIMEPIRIDE 4 MG/1
4 TABLET ORAL
Qty: 90 TABLET | Refills: 1 | Status: SHIPPED | OUTPATIENT
Start: 2021-03-30 | End: 2021-10-25

## 2021-03-30 RX ORDER — PEN NEEDLE, DIABETIC 32 GX 1/4"
NEEDLE, DISPOSABLE MISCELLANEOUS
Qty: 100 EACH | Refills: 3 | Status: SHIPPED | OUTPATIENT
Start: 2021-03-30 | End: 2021-05-21

## 2021-03-30 RX ORDER — INSULIN GLARGINE 300 U/ML
20 INJECTION, SOLUTION SUBCUTANEOUS DAILY
Qty: 3 PEN | Refills: 2 | Status: SHIPPED | OUTPATIENT
Start: 2021-03-30 | End: 2021-11-07 | Stop reason: SDUPTHER

## 2021-03-30 NOTE — PROGRESS NOTES
"Subjective   Marina Hugo is a 65 y.o. female.     History of Present Illness    Since the last visit, she has overall felt well.  She has Essential Hypertension and well controlled on current medication, DMII well controlled on medication and will continue regimen, Hyperlipidemia with goals met with current Rx, Seasonal allergies and doing well on their medication  and Asthma well controlled and not requiring rescue Albuterol > 2 times a week.  she has been compliant with current medications have reviewed them.  The patient denies medication side effects.  Will refill medications. /70   Pulse 86   Temp 94.4 °F (34.7 °C)   Resp 16   Ht 162.6 cm (64\")   Wt 131 kg (288 lb)   SpO2 95%   BMI 49.44 kg/m²     Results for orders placed or performed in visit on 03/26/21   Comprehensive Metabolic Panel   Result Value Ref Range    Glucose 114 (H) 65 - 99 mg/dL    BUN 29 (H) 8 - 23 mg/dL    Creatinine 0.75 0.57 - 1.00 mg/dL    eGFR Non African Am 77 >60 mL/min/1.73    eGFR African Am 93 >60 mL/min/1.73    BUN/Creatinine Ratio 38.7 (H) 7.0 - 25.0    Sodium 140 136 - 145 mmol/L    Potassium 4.6 3.5 - 5.2 mmol/L    Chloride 103 98 - 107 mmol/L    Total CO2 26.9 22.0 - 29.0 mmol/L    Calcium 9.7 8.6 - 10.5 mg/dL    Total Protein 6.8 6.0 - 8.5 g/dL    Albumin 4.30 3.50 - 5.20 g/dL    Globulin 2.5 gm/dL    A/G Ratio 1.7 g/dL    Total Bilirubin 0.4 0.0 - 1.2 mg/dL    Alkaline Phosphatase 89 39 - 117 U/L    AST (SGOT) 12 1 - 32 U/L    ALT (SGPT) 12 1 - 33 U/L   Manual Differential   Result Value Ref Range    Neutrophil Rel % 59.6 42.7 - 76.0 %    Lymphocyte Rel % 26.3 19.6 - 45.3 %    Monocyte Rel % 8.1 5.0 - 12.0 %    Eosinophil Rel % 5.1 0.3 - 6.2 %    Basophil Rel % 1.0 0.0 - 1.5 %    Neutrophils Absolute 4.77 1.70 - 7.00 10*3/mm3    Lymphocytes Absolute 2.10 0.70 - 3.10 10*3/mm3    Monocytes Absolute 0.65 0.10 - 0.90 10*3/mm3    Eosinophil Abs 0.41 (H) 0.00 - 0.40 10*3/mm3    Basophils Absolute 0.08 0.00 - 0.20 " 10*3/mm3    Differential Comment Comment     Comment Comment     Plt Comment Comment    Lipid Panel   Result Value Ref Range    Total Cholesterol 130 0 - 200 mg/dL    Triglycerides 60 0 - 150 mg/dL    HDL Cholesterol 67 (H) 40 - 60 mg/dL    VLDL Cholesterol J Carlos 13 5 - 40 mg/dL    LDL Chol Calc (NIH) 50 0 - 100 mg/dL   Hemoglobin A1c   Result Value Ref Range    Hemoglobin A1C 7.10 (H) 4.80 - 5.60 %   TSH   Result Value Ref Range    TSH 1.480 0.270 - 4.200 uIU/mL   MicroAlbumin, Urine, Random -   Result Value Ref Range    Microalbumin, Urine 3.7 Not Estab. ug/mL   CBC & Differential   Result Value Ref Range    WBC 8.00 3.40 - 10.80 10*3/mm3    RBC 4.62 3.77 - 5.28 10*6/mm3    Hemoglobin 9.7 (L) 12.0 - 15.9 g/dL    Hematocrit 33.4 (L) 34.0 - 46.6 %    MCV 72.3 (L) 79.0 - 97.0 fL    MCH 21.0 (L) 26.6 - 33.0 pg    MCHC 29.0 (L) 31.5 - 35.7 g/dL    RDW 17.9 (H) 12.3 - 15.4 %    Platelets 466 (H) 140 - 450 10*3/mm3    nRBC 0.0 0.0 - 0.2 /100 WBC     Labs reviewed with pt today during visit. All questions answered.  Discussed worsening chronic anemia.  She never saw hematology.  She has not yet scheduled colonoscopy but is due as she has to have one every 3 years.  Discussed importance of scheduling this for workup of anemia. She does not take iron. She reports feeling cold more often but denies fatigue, dizziness, easy bruising.     The following portions of the patient's history were reviewed and updated as appropriate: allergies, current medications, past family history, past medical history, past social history, past surgical history and problem list.    Review of Systems   Constitutional: Negative for fatigue and unexpected weight change.   Eyes: Negative for visual disturbance.   Respiratory: Negative for shortness of breath.    Cardiovascular: Negative for chest pain and palpitations.   Endocrine: Positive for cold intolerance. Negative for heat intolerance, polydipsia, polyphagia and polyuria.   Neurological:  Negative for dizziness and light-headedness.   Psychiatric/Behavioral: Negative for behavioral problems.       Objective   Physical Exam  Vitals and nursing note reviewed.   Constitutional:       Appearance: Normal appearance. She is well-developed.   Cardiovascular:      Rate and Rhythm: Normal rate and regular rhythm.   Pulmonary:      Effort: Pulmonary effort is normal.      Breath sounds: Normal breath sounds.   Neurological:      Mental Status: She is alert and oriented to person, place, and time.   Psychiatric:         Mood and Affect: Mood normal.         Behavior: Behavior normal.         Thought Content: Thought content normal.         Judgment: Judgment normal.         Assessment/Plan   Diagnoses and all orders for this visit:    1. Moderate persistent asthma with acute exacerbation (Primary)  -     Fluticasone-Umeclidin-Vilant (Trelegy Ellipta) 200-62.5-25 MCG/INH aerosol powder ; Inhale 1 puff Daily.  Dispense: 3 each; Refill: 1    2. Essential hypertension  -     valsartan-hydrochlorothiazide (Diovan HCT) 160-12.5 MG per tablet; Take 1 tablet by mouth Daily.  Dispense: 90 tablet; Refill: 1  -     amLODIPine (NORVASC) 10 MG tablet; Take 1 tablet by mouth Daily.  Dispense: 90 tablet; Refill: 1    3. Type 2 diabetes mellitus without complication, without long-term current use of insulin (CMS/[facility])  -     pioglitazone (ACTOS) 30 MG tablet; Take 1 tablet by mouth Daily.  Dispense: 90 tablet; Refill: 1  -     metFORMIN ER (GLUCOPHAGE-XR) 500 MG 24 hr tablet; TAKE 4 TABLETS BY MOUTH EVERY MORNING  Dispense: 360 tablet; Refill: 1  -     glimepiride (AMARYL) 4 MG tablet; Take 1 tablet by mouth Every Morning Before Breakfast.  Dispense: 90 tablet; Refill: 1  -     Insulin Glargine, 1 Unit Dial, (Toujeo SoloStar) 300 UNIT/ML solution pen-injector injection; Inject 20 Units under the skin into the appropriate area as directed Daily.  Dispense: 3 pen; Refill: 2  -     Insulin Pen Needle (CareFine Pen Needles) 32G X 6  MM misc; Use needle with toujeo  Dispense: 100 each; Refill: 3  -     Empagliflozin (Jardiance) 10 MG tablet; Take 10 mg by mouth Daily.  Dispense: 90 tablet; Refill: 1    4. Primary osteoarthritis involving multiple joints  -     meloxicam (MOBIC) 15 MG tablet; Take 1 tablet by mouth Daily.  Dispense: 90 tablet; Refill: 1    5. Mixed hyperlipidemia  -     atorvastatin (LIPITOR) 20 MG tablet; Take 1 tablet by mouth Daily.  Dispense: 90 tablet; Refill: 1    6. Irritable bowel syndrome without diarrhea  -     dicyclomine (BENTYL) 10 MG capsule; Take 1 capsule by mouth 2 (Two) Times a Day.  Dispense: 180 capsule; Refill: 1    7. Anemia, unspecified type  -     ferrous sulfate 325 (65 FE) MG tablet; Take 1 tablet by mouth Daily. With food  Dispense: 90 tablet; Refill: 1          Start ferrous sulfate.    Schedule colonoscopy.

## 2021-04-02 ENCOUNTER — TELEPHONE (OUTPATIENT)
Dept: FAMILY MEDICINE CLINIC | Facility: CLINIC | Age: 66
End: 2021-04-02

## 2021-04-02 DIAGNOSIS — D64.9 ANEMIA, UNSPECIFIED TYPE: Primary | ICD-10-CM

## 2021-04-02 NOTE — TELEPHONE ENCOUNTER
----- Message from Dwayne Allen MD sent at 4/1/2021  3:23 PM EDT -----  Please call the patient regarding her abnormal result. Labs good EXCEPT for worsening anemia. I ordered a GI consult last year but pt didn't go due to COVID. Reorder GI consult and get her in quickly.

## 2021-04-19 ENCOUNTER — TELEPHONE (OUTPATIENT)
Dept: GASTROENTEROLOGY | Facility: CLINIC | Age: 66
End: 2021-04-19

## 2021-04-19 NOTE — TELEPHONE ENCOUNTER
Called and LM for patient to contact office.   Likely secondary to viral hepatitis C  Monitor clinical exams

## 2021-04-19 NOTE — TELEPHONE ENCOUNTER
----- Message from Alma Dunn sent at 4/16/2021  3:56 PM EDT -----  Regarding: OA  Contact: [phone]    ----- Message -----  From: Chris Montesinos RegSched Rep  Sent: 4/16/2021   3:52 PM EDT  To: [facility]    Pt needs to be schedule for a colonoscopy. [phone]

## 2021-04-22 ENCOUNTER — TELEPHONE (OUTPATIENT)
Dept: GASTROENTEROLOGY | Facility: CLINIC | Age: 66
End: 2021-04-22

## 2021-04-22 NOTE — TELEPHONE ENCOUNTER
----- Message from Hong Brar sent at 4/14/2021  4:25 PM EDT -----  Regarding: oa colonoscopy  Contact: [phone]  Please call pt for oa scope after 3:00. Thank you

## 2021-04-23 ENCOUNTER — TELEPHONE (OUTPATIENT)
Dept: GASTROENTEROLOGY | Facility: CLINIC | Age: 66
End: 2021-04-23

## 2021-04-23 DIAGNOSIS — K63.5 POLYP OF COLON, UNSPECIFIED PART OF COLON, UNSPECIFIED TYPE: Primary | ICD-10-CM

## 2021-04-23 NOTE — TELEPHONE ENCOUNTER
Last scope 12/18/2017 in epic-- personal hx of polyps-- no family hx of polyps or colon ca-- no ASA or blood thinners-- medications:    albuterol (PROAIR HFA) 108 (90 BASE) MCG/ACT inhaler  amLODIPine (NORVASC) 10 MG tablet  atorvastatin (LIPITOR) 20 MG tablet  cetirizine (zyrTEC) 10 MG tablet  dicyclomine (BENTYL) 10 MG capsule  Empagliflozin (Jardiance) 10 MG tablet  ferrous sulfate 325 (65 FE) MG tablet  Fluticasone-Umeclidin-Vilant (Trelegy Ellipta) 200-62.5-25 MCG/INH aerosol powder   glimepiride (AMARYL) 4 MG tablet  Insulin Glargine, 1 Unit Dial, (Toujeo SoloStar) 300 UNIT/ML solution pen-injector injection  Insulin Pen Needle (nanoMR Pen Needles) 32G X 6 MM misc  meloxicam (MOBIC) 15 MG tablet  metFORMIN ER (GLUCOPHAGE-XR) 500 MG 24 hr tablet  pioglitazone (ACTOS) 30 MG tablet  valsartan-hydrochlorothiazide (Diovan HCT) 160-12.5 MG per tablet    OA form scanned into media

## 2021-04-28 ENCOUNTER — TELEPHONE (OUTPATIENT)
Dept: FAMILY MEDICINE CLINIC | Facility: CLINIC | Age: 66
End: 2021-04-28

## 2021-04-29 ENCOUNTER — TELEPHONE (OUTPATIENT)
Dept: GASTROENTEROLOGY | Facility: CLINIC | Age: 66
End: 2021-04-29

## 2021-04-30 ENCOUNTER — TELEPHONE (OUTPATIENT)
Dept: GASTROENTEROLOGY | Facility: CLINIC | Age: 66
End: 2021-04-30

## 2021-04-30 PROBLEM — K63.5 POLYP OF COLON: Status: ACTIVE | Noted: 2021-04-30

## 2021-04-30 NOTE — TELEPHONE ENCOUNTER
----- Message from Hong Brar sent at 4/29/2021  2:37 PM EDT -----  Regarding: schedule colonoscopy  Contact: [phone]  Please call pt to schedule colonoscopy

## 2021-04-30 NOTE — TELEPHONE ENCOUNTER
spoke with pt scheduled at [facility] [place] on july 29 arrive at 0700 am speedy garvin---ondina

## 2021-04-30 NOTE — TELEPHONE ENCOUNTER
spoke with pt scheduled at [facility] on july 29 arrive at 0700 am speedy garvin---ondina

## 2021-05-21 DIAGNOSIS — E11.9 TYPE 2 DIABETES MELLITUS WITHOUT COMPLICATION, WITHOUT LONG-TERM CURRENT USE OF INSULIN (HCC): ICD-10-CM

## 2021-05-21 RX ORDER — PEN NEEDLE, DIABETIC 32 GX 1/4"
NEEDLE, DISPOSABLE MISCELLANEOUS
Qty: 100 EACH | Refills: 3 | Status: SHIPPED | OUTPATIENT
Start: 2021-05-21 | End: 2022-08-02

## 2021-06-01 ENCOUNTER — TRANSCRIBE ORDERS (OUTPATIENT)
Dept: PREADMISSION TESTING | Facility: HOSPITAL | Age: 66
End: 2021-06-01

## 2021-06-01 DIAGNOSIS — Z01.818 OTHER SPECIFIED PRE-OPERATIVE EXAMINATION: Primary | ICD-10-CM

## 2021-06-03 ENCOUNTER — PRE-ADMISSION TESTING (OUTPATIENT)
Dept: PREADMISSION TESTING | Facility: HOSPITAL | Age: 66
End: 2021-06-03

## 2021-06-03 VITALS
SYSTOLIC BLOOD PRESSURE: 147 MMHG | BODY MASS INDEX: 49.54 KG/M2 | RESPIRATION RATE: 16 BRPM | OXYGEN SATURATION: 98 % | DIASTOLIC BLOOD PRESSURE: 92 MMHG | HEART RATE: 86 BPM | TEMPERATURE: 97.5 F | WEIGHT: 290.2 LBS | HEIGHT: 64 IN

## 2021-06-03 LAB
ALBUMIN SERPL-MCNC: 4.5 G/DL (ref 3.5–5.2)
ALBUMIN/GLOB SERPL: 1.7 G/DL
ALP SERPL-CCNC: 79 U/L (ref 39–117)
ALT SERPL W P-5'-P-CCNC: 18 U/L (ref 1–33)
ANION GAP SERPL CALCULATED.3IONS-SCNC: 13.2 MMOL/L (ref 5–15)
AST SERPL-CCNC: 18 U/L (ref 1–32)
BILIRUB SERPL-MCNC: 0.3 MG/DL (ref 0–1.2)
BUN SERPL-MCNC: 25 MG/DL (ref 8–23)
BUN/CREAT SERPL: 34.7 (ref 7–25)
CALCIUM SPEC-SCNC: 10.1 MG/DL (ref 8.6–10.5)
CHLORIDE SERPL-SCNC: 102 MMOL/L (ref 98–107)
CO2 SERPL-SCNC: 23.8 MMOL/L (ref 22–29)
CREAT SERPL-MCNC: 0.72 MG/DL (ref 0.57–1)
DEPRECATED RDW RBC AUTO: 64.8 FL (ref 37–54)
ERYTHROCYTE [DISTWIDTH] IN BLOOD BY AUTOMATED COUNT: 22.3 % (ref 12.3–15.4)
GFR SERPL CREATININE-BSD FRML MDRD: 81 ML/MIN/1.73
GLOBULIN UR ELPH-MCNC: 2.7 GM/DL
GLUCOSE SERPL-MCNC: 78 MG/DL (ref 65–99)
HCT VFR BLD AUTO: 46.4 % (ref 34–46.6)
HGB BLD-MCNC: 14.2 G/DL (ref 12–15.9)
MCH RBC QN AUTO: 25.7 PG (ref 26.6–33)
MCHC RBC AUTO-ENTMCNC: 30.6 G/DL (ref 31.5–35.7)
MCV RBC AUTO: 83.9 FL (ref 79–97)
PLATELET # BLD AUTO: 339 10*3/MM3 (ref 140–450)
PMV BLD AUTO: 9.8 FL (ref 6–12)
POTASSIUM SERPL-SCNC: 4.5 MMOL/L (ref 3.5–5.2)
PROT SERPL-MCNC: 7.2 G/DL (ref 6–8.5)
QT INTERVAL: 351 MS
RBC # BLD AUTO: 5.53 10*6/MM3 (ref 3.77–5.28)
SODIUM SERPL-SCNC: 139 MMOL/L (ref 136–145)
WBC # BLD AUTO: 9 10*3/MM3 (ref 3.4–10.8)

## 2021-06-03 PROCEDURE — 93005 ELECTROCARDIOGRAM TRACING: CPT

## 2021-06-03 PROCEDURE — 93010 ELECTROCARDIOGRAM REPORT: CPT | Performed by: INTERNAL MEDICINE

## 2021-06-03 PROCEDURE — 85027 COMPLETE CBC AUTOMATED: CPT

## 2021-06-03 PROCEDURE — 80053 COMPREHEN METABOLIC PANEL: CPT

## 2021-06-03 PROCEDURE — 36415 COLL VENOUS BLD VENIPUNCTURE: CPT

## 2021-06-03 RX ORDER — BENZONATATE 200 MG/1
200 CAPSULE ORAL 3 TIMES DAILY PRN
COMMUNITY
End: 2022-02-16 | Stop reason: SDUPTHER

## 2021-06-03 NOTE — DISCHARGE INSTRUCTIONS
Take the following medications the morning of surgery: INHALERS    General Instructions:  • Do not eat solid food after midnight the night before surgery.  • You may drink clear liquids day of surgery but must stop at least one hour before your hospital arrival time.  • It is beneficial for you to have a clear drink that contains carbohydrates the day of surgery.  We suggest a 12 to 20 ounce bottle of Gatorade or Powerade for non-diabetic patients or a 12 to 20 ounce bottle of G2 or Powerade Zero for diabetic patients. (Pediatric patients, are not advised to drink a 12 to 20 ounce carbohydrate drink)    Clear liquids are liquids you can see through.  Nothing red in color.     Plain water                               Sports drinks  Sodas                                   Gelatin (Jell-O)  Fruit juices without pulp such as white grape juice and apple juice  Popsicles that contain no fruit or yogurt  Tea or coffee (no cream or milk added)  Gatorade / Powerade  G2 / Powerade Zero      • Patients who avoid smoking, chewing tobacco and alcohol for 4 weeks prior to surgery have a reduced risk of post-operative complications.  Quit smoking as many days before surgery as you can.  • Do not smoke, use chewing tobacco or drink alcohol the day of surgery.   • Bring any papers given to you in the doctor’s office.  • Wear clean comfortable clothes.  • Do not wear contact lenses, false eyelashes or make-up.  Bring a case for your glasses.   • Bring crutches or walker if applicable.  • Remove all piercings.  Leave jewelry and any other valuables at home.  • Hair extensions with metal clips must be removed prior to surgery.  • The Pre-Admission Testing nurse will instruct you to bring medications if unable to obtain an accurate list in Pre-Admission Testing.        Preventing a Surgical Site Infection:  • For 2 to 3 days before surgery, avoid shaving with a razor because the razor can irritate skin and make it easier to develop an  infection.    • Any areas of open skin can increase the risk of a post-operative wound infection by allowing bacteria to enter and travel throughout the body.  Notify your surgeon if you have any skin wounds / rashes even if it is not near the expected surgical site.  The area will need assessed to determine if surgery should be delayed until it is healed.  • The night prior to surgery shower using a fresh bar of anti-bacterial soap (such as Dial) and clean washcloth.  Sleep in a clean bed with clean clothing.  Do not allow pets to sleep with you.  • Shower on the morning of surgery using a fresh bar of anti-bacterial soap (such as Dial) and clean washcloth.  Dry with a clean towel and dress in clean clothing.  • Ask your surgeon if you will be receiving antibiotics prior to surgery.  • Make sure you, your family, and all healthcare providers clean their hands with soap and water or an alcohol based hand  before caring for you or your wound.    Day of surgery:  Your arrival time is approximately two hours before your scheduled surgery time.  Upon arrival, a Pre-op nurse and Anesthesiologist will review your health history, obtain vital signs, and answer questions you may have.  The only belongings needed at this time will be a list of your home medications and if applicable your C-PAP/BI-PAP machine.  A Pre-op nurse will start an IV and you may receive medication in preparation for surgery, including something to help you relax.     Please be aware that surgery does come with discomfort.  We want to make every effort to control your discomfort so please discuss any uncontrolled symptoms with your nurse.   Your doctor will most likely have prescribed pain medications.      If you are going home after surgery you will receive individualized written care instructions before being discharged.  A responsible adult must drive you to and from the hospital on the day of your surgery and stay with you for 24 hours.   Discharge prescriptions can be filled by the hospital pharmacy during regular pharmacy hours.  If you are having surgery late in the day/evening your prescription may be e-prescribed to your pharmacy.  Please verify your pharmacy hours or chose a 24 hour pharmacy to avoid not having access to your prescription because your pharmacy has closed for the day.    If you are staying overnight following surgery, you will be transported to your hospital room following the recovery period.  [facility] has all private rooms.    If you have any questions please call Pre-Admission Testing at [phone].  Deductibles and co-payments are collected on the day of service. Please be prepared to pay the required co-pay, deductible or deposit on the day of service as defined by your plan.    Patient Education for Self-Quarantine Process    Following your COVID testing, we strongly recommend that you do not leave your home after you have been tested for COVID except to get medical care. This includes not going to work, school or to public areas.  If this is not possible for you to do please limit your activities to only required outings.  Be sure to wear a mask when you are with other people, practice social distancing and wash your hands frequently.      The following items provide additional details to keep you safe.  • Wash your hands with soap and water frequently for at least 20 seconds.   • Avoid touching your eyes, nose and mouth with unwashed hands.  • Do not share anything - utensils, towels, food from the same bowl.   • Have your own utensils, drinking glass, dishes, towels and bedding.   • Do not have visitors.   • Do use FaceTime to stay in touch with family and friends.  • You should stay in a specific room away from others if possible.   • Stay at least 6 feet away from others in the home if you cannot have a dedicated room to yourself.   • Do not snuggle with your pet. While the CDC says there is no  evidence that pets can spread COVID-19 or be infected from humans, it is probably best to avoid “petting, snuggling, being kissed or licked and sharing food (during self-quarantine)”, according to the CDC.   • Sanitize household surfaces daily. Include all high touch areas (door handles, light switches, phones, countertops, etc.)  • Do not share a bathroom with others, if possible.   • Wear a mask around others in your home if you are unable to stay in a separate room or 6 feet apart. If  you are unable to wear a mask, have your family member wear a mask if they must be within 6 feet of you.   Call your surgeon immediately if you experience any of the following symptoms:  • Sore Throat  • Shortness of Breath or difficulty breathing  • Cough  • Chills  • Body soreness or muscle pain  • Headache  • Fever  • New loss of taste or smell  • Do not arrive for your surgery ill.  Your procedure will need to be rescheduled to another time.  You will need to call your physician before the day of surgery to avoid any unnecessary exposure to hospital staff as well as other patients.

## 2021-06-04 ENCOUNTER — HOSPITAL ENCOUNTER (OUTPATIENT)
Dept: GENERAL RADIOLOGY | Facility: HOSPITAL | Age: 66
Discharge: HOME | End: 2021-06-04
Admitting: ORTHOPAEDIC SURGERY

## 2021-06-04 PROCEDURE — 71046 X-RAY EXAM CHEST 2 VIEWS: CPT

## 2021-06-08 NOTE — H&P
Provider: JEANNETTE AVINA MD  HPI  Chief complaint right rotator cuff tear.  65-year-old  female has been previously diagnosed on MRI as having a 75% partial thickness rotator cuff tear.  She has been treated conservatively.  She is a teacher.  She is interested in definitive care but likely this summer.  Social history was automatically updated to reflect changes to the patient's age and marital status.      Allergies, medications, past medical history, surgical history, family history, social history, and ROS were reviewed and unchanged (02/12/2021).    PCP Dr. LATRICE LOPEZ, [facility]    Physical Exam  Height:  62 in.    Weight:  285 lbs.     BMI:  52.32    Mental/HEENT/Cardio/Skin  The patient's general appearance is well nourished, well hydrated, no acute distress.  Orientation is alert and oriented x 3.  The patient's mood is normal.  Pulmonary exam shows normal air exchange, no labored breathing, or shortness of breath.  A lymphatic exam reveals no adenopathy in the area of examination.      Right Shoulder  Skin is normal.  There is no atrophy.  There is no effusion.  There is no warmth.  No erythema.  Lymphadenopathy is negative.  Right shoulder active ROM today shows: Elevation = 140; ER(side) = 40; ER(abd) = 80; IR(abd) = 60; IR(vert) = to waist; Abduction = 100.  Shoulder strength: Elevation = 3/5; ER = 4/5; IR = 5-/5; ABD = 4/5.  Neer test is positive.  Mac test is positive.  Arc of motion is positive.  Empty can test was positive.  Pulses are normal.  Normal sensation.  Capillary refill is normal.        Imaging/Diagnostic Studies  Three-view x-ray right shoulder shows some anterior acromial spurring and some acromioclavicular joint space narrowing but no lytic lesions or other significant joint space abnormalities    MRI   From [facility] imaging dated 6/19/2020.shows 1 cm x 1 cm high-grade partial-thickness tear anterior supraspinatus.  Subscapularis tendinosis with no tear.  AC joint  arthritis.  Impression  Right chronic partial rotator cuff tear  Right AC joint osteoarthritis  Right shoulder impingement syndrome    Plan    We discussed operative and nonoperative options.  She is failed a conservative course of treatment and would like to proceed with operative intervention which will be right shoulder arthroscopy, acromioplasty, rotator cuff repair.  We discussed risk benefits and alternatives as well as postoperative recovery.

## 2021-06-15 ENCOUNTER — APPOINTMENT (OUTPATIENT)
Dept: LAB | Facility: HOSPITAL | Age: 66
End: 2021-06-15

## 2021-06-15 ENCOUNTER — LAB (OUTPATIENT)
Dept: LAB | Facility: HOSPITAL | Age: 66
End: 2021-06-15

## 2021-06-15 DIAGNOSIS — Z01.818 OTHER SPECIFIED PRE-OPERATIVE EXAMINATION: ICD-10-CM

## 2021-06-15 LAB — SARS-COV-2 ORF1AB RESP QL NAA+PROBE: NOT DETECTED

## 2021-06-15 PROCEDURE — C9803 HOPD COVID-19 SPEC COLLECT: HCPCS

## 2021-06-15 PROCEDURE — U0004 COV-19 TEST NON-CDC HGH THRU: HCPCS

## 2021-06-17 ENCOUNTER — ANESTHESIA (OUTPATIENT)
Dept: PERIOP | Facility: HOSPITAL | Age: 66
End: 2021-06-17

## 2021-06-17 ENCOUNTER — ANESTHESIA EVENT (OUTPATIENT)
Dept: PERIOP | Facility: HOSPITAL | Age: 66
End: 2021-06-17

## 2021-06-17 ENCOUNTER — HOSPITAL ENCOUNTER (OUTPATIENT)
Facility: HOSPITAL | Age: 66
Setting detail: HOSPITAL OUTPATIENT SURGERY
Discharge: HOME | End: 2021-06-17
Attending: ORTHOPAEDIC SURGERY | Admitting: ORTHOPAEDIC SURGERY

## 2021-06-17 VITALS
RESPIRATION RATE: 16 BRPM | OXYGEN SATURATION: 93 % | HEART RATE: 91 BPM | DIASTOLIC BLOOD PRESSURE: 80 MMHG | SYSTOLIC BLOOD PRESSURE: 127 MMHG | TEMPERATURE: 98 F

## 2021-06-17 DIAGNOSIS — Z98.890 STATUS POST SHOULDER SURGERY: Primary | ICD-10-CM

## 2021-06-17 LAB — GLUCOSE BLDC GLUCOMTR-MCNC: 90 MG/DL (ref 70–130)

## 2021-06-17 PROCEDURE — 25010000002 DEXAMETHASONE PER 1 MG: Performed by: NURSE ANESTHETIST, CERTIFIED REGISTERED

## 2021-06-17 PROCEDURE — 25010000002 ONDANSETRON PER 1 MG: Performed by: NURSE ANESTHETIST, CERTIFIED REGISTERED

## 2021-06-17 PROCEDURE — 25010000003 CEFAZOLIN IN DEXTROSE 2-4 GM/100ML-% SOLUTION: Performed by: ORTHOPAEDIC SURGERY

## 2021-06-17 PROCEDURE — 25010000002 PROPOFOL 10 MG/ML EMULSION: Performed by: NURSE ANESTHETIST, CERTIFIED REGISTERED

## 2021-06-17 PROCEDURE — 25010000002 PHENYLEPHRINE PER 1 ML: Performed by: NURSE ANESTHETIST, CERTIFIED REGISTERED

## 2021-06-17 PROCEDURE — 25010000002 FENTANYL CITRATE (PF) 50 MCG/ML SOLUTION: Performed by: ANESTHESIOLOGY

## 2021-06-17 PROCEDURE — 76942 ECHO GUIDE FOR BIOPSY: CPT | Performed by: ORTHOPAEDIC SURGERY

## 2021-06-17 PROCEDURE — 82962 GLUCOSE BLOOD TEST: CPT

## 2021-06-17 PROCEDURE — 25010000002 ROPIVACAINE PER 1 MG: Performed by: ANESTHESIOLOGY

## 2021-06-17 PROCEDURE — 25010000002 DEXAMETHASONE PER 1 MG: Performed by: ANESTHESIOLOGY

## 2021-06-17 PROCEDURE — 25010000002 EPINEPHRINE PER 0.1 MG: Performed by: ORTHOPAEDIC SURGERY

## 2021-06-17 PROCEDURE — 25010000002 MIDAZOLAM PER 1 MG: Performed by: ANESTHESIOLOGY

## 2021-06-17 PROCEDURE — 25010000002 ONDANSETRON PER 1 MG: Performed by: ANESTHESIOLOGY

## 2021-06-17 RX ORDER — GLYCOPYRROLATE 0.2 MG/ML
0.2 INJECTION INTRAMUSCULAR; INTRAVENOUS
Status: COMPLETED | OUTPATIENT
Start: 2021-06-17 | End: 2021-06-17

## 2021-06-17 RX ORDER — LIDOCAINE HYDROCHLORIDE 10 MG/ML
0.5 INJECTION, SOLUTION EPIDURAL; INFILTRATION; INTRACAUDAL; PERINEURAL ONCE AS NEEDED
Status: COMPLETED | OUTPATIENT
Start: 2021-06-17 | End: 2021-06-17

## 2021-06-17 RX ORDER — ROPIVACAINE HYDROCHLORIDE 5 MG/ML
INJECTION, SOLUTION EPIDURAL; INFILTRATION; PERINEURAL
Status: COMPLETED | OUTPATIENT
Start: 2021-06-17 | End: 2021-06-17

## 2021-06-17 RX ORDER — SODIUM CHLORIDE 0.9 % (FLUSH) 0.9 %
3 SYRINGE (ML) INJECTION EVERY 12 HOURS SCHEDULED
Status: DISCONTINUED | OUTPATIENT
Start: 2021-06-17 | End: 2021-06-17 | Stop reason: HOSPADM

## 2021-06-17 RX ORDER — HYDROMORPHONE HYDROCHLORIDE 1 MG/ML
0.5 INJECTION, SOLUTION INTRAMUSCULAR; INTRAVENOUS; SUBCUTANEOUS
Status: DISCONTINUED | OUTPATIENT
Start: 2021-06-17 | End: 2021-06-17 | Stop reason: HOSPADM

## 2021-06-17 RX ORDER — PROPOFOL 10 MG/ML
VIAL (ML) INTRAVENOUS AS NEEDED
Status: DISCONTINUED | OUTPATIENT
Start: 2021-06-17 | End: 2021-06-17 | Stop reason: SURG

## 2021-06-17 RX ORDER — FENTANYL CITRATE 50 UG/ML
50 INJECTION, SOLUTION INTRAMUSCULAR; INTRAVENOUS
Status: DISCONTINUED | OUTPATIENT
Start: 2021-06-17 | End: 2021-06-17 | Stop reason: HOSPADM

## 2021-06-17 RX ORDER — SODIUM CHLORIDE 0.9 % (FLUSH) 0.9 %
3-10 SYRINGE (ML) INJECTION AS NEEDED
Status: DISCONTINUED | OUTPATIENT
Start: 2021-06-17 | End: 2021-06-17 | Stop reason: HOSPADM

## 2021-06-17 RX ORDER — CEFAZOLIN SODIUM 2 G/100ML
2 INJECTION, SOLUTION INTRAVENOUS ONCE
Status: COMPLETED | OUTPATIENT
Start: 2021-06-17 | End: 2021-06-17

## 2021-06-17 RX ORDER — HYDROCODONE BITARTRATE AND ACETAMINOPHEN 5; 325 MG/1; MG/1
1 TABLET ORAL ONCE AS NEEDED
Status: DISCONTINUED | OUTPATIENT
Start: 2021-06-17 | End: 2021-06-17 | Stop reason: HOSPADM

## 2021-06-17 RX ORDER — LIDOCAINE HYDROCHLORIDE 20 MG/ML
INJECTION, SOLUTION INFILTRATION; PERINEURAL AS NEEDED
Status: DISCONTINUED | OUTPATIENT
Start: 2021-06-17 | End: 2021-06-17 | Stop reason: SURG

## 2021-06-17 RX ORDER — FLUMAZENIL 0.1 MG/ML
0.2 INJECTION INTRAVENOUS AS NEEDED
Status: DISCONTINUED | OUTPATIENT
Start: 2021-06-17 | End: 2021-06-17 | Stop reason: HOSPADM

## 2021-06-17 RX ORDER — OXYCODONE AND ACETAMINOPHEN 7.5; 325 MG/1; MG/1
1 TABLET ORAL EVERY 4 HOURS PRN
Qty: 28 TABLET | Refills: 0 | Status: SHIPPED | OUTPATIENT
Start: 2021-06-17 | End: 2022-04-08

## 2021-06-17 RX ORDER — MIDAZOLAM HYDROCHLORIDE 1 MG/ML
0.5 INJECTION INTRAMUSCULAR; INTRAVENOUS
Status: DISCONTINUED | OUTPATIENT
Start: 2021-06-17 | End: 2021-06-17 | Stop reason: HOSPADM

## 2021-06-17 RX ORDER — ROCURONIUM BROMIDE 10 MG/ML
INJECTION, SOLUTION INTRAVENOUS AS NEEDED
Status: DISCONTINUED | OUTPATIENT
Start: 2021-06-17 | End: 2021-06-17 | Stop reason: SURG

## 2021-06-17 RX ORDER — ONDANSETRON 2 MG/ML
INJECTION INTRAMUSCULAR; INTRAVENOUS AS NEEDED
Status: DISCONTINUED | OUTPATIENT
Start: 2021-06-17 | End: 2021-06-17 | Stop reason: SURG

## 2021-06-17 RX ORDER — ONDANSETRON 2 MG/ML
4 INJECTION INTRAMUSCULAR; INTRAVENOUS ONCE AS NEEDED
Status: COMPLETED | OUTPATIENT
Start: 2021-06-17 | End: 2021-06-17

## 2021-06-17 RX ORDER — EPHEDRINE SULFATE 50 MG/ML
5 INJECTION, SOLUTION INTRAVENOUS ONCE AS NEEDED
Status: DISCONTINUED | OUTPATIENT
Start: 2021-06-17 | End: 2021-06-17 | Stop reason: HOSPADM

## 2021-06-17 RX ORDER — SODIUM CHLORIDE, SODIUM LACTATE, POTASSIUM CHLORIDE, CALCIUM CHLORIDE 600; 310; 30; 20 MG/100ML; MG/100ML; MG/100ML; MG/100ML
9 INJECTION, SOLUTION INTRAVENOUS CONTINUOUS
Status: DISCONTINUED | OUTPATIENT
Start: 2021-06-17 | End: 2021-06-17 | Stop reason: HOSPADM

## 2021-06-17 RX ORDER — HYDROCODONE BITARTRATE AND ACETAMINOPHEN 7.5; 325 MG/1; MG/1
2 TABLET ORAL EVERY 4 HOURS PRN
Status: DISCONTINUED | OUTPATIENT
Start: 2021-06-17 | End: 2021-06-17 | Stop reason: HOSPADM

## 2021-06-17 RX ORDER — MIDAZOLAM HYDROCHLORIDE 1 MG/ML
INJECTION INTRAMUSCULAR; INTRAVENOUS
Status: COMPLETED | OUTPATIENT
Start: 2021-06-17 | End: 2021-06-17

## 2021-06-17 RX ORDER — ESMOLOL HYDROCHLORIDE 10 MG/ML
INJECTION INTRAVENOUS AS NEEDED
Status: DISCONTINUED | OUTPATIENT
Start: 2021-06-17 | End: 2021-06-17 | Stop reason: SURG

## 2021-06-17 RX ORDER — DEXAMETHASONE SODIUM PHOSPHATE 4 MG/ML
INJECTION, SOLUTION INTRA-ARTICULAR; INTRALESIONAL; INTRAMUSCULAR; INTRAVENOUS; SOFT TISSUE
Status: COMPLETED | OUTPATIENT
Start: 2021-06-17 | End: 2021-06-17

## 2021-06-17 RX ORDER — DEXAMETHASONE SODIUM PHOSPHATE 10 MG/ML
INJECTION INTRAMUSCULAR; INTRAVENOUS AS NEEDED
Status: DISCONTINUED | OUTPATIENT
Start: 2021-06-17 | End: 2021-06-17 | Stop reason: SURG

## 2021-06-17 RX ORDER — ROPIVACAINE HYDROCHLORIDE 2 MG/ML
INJECTION, SOLUTION EPIDURAL; INFILTRATION; PERINEURAL
Status: COMPLETED | OUTPATIENT
Start: 2021-06-17 | End: 2021-06-17

## 2021-06-17 RX ORDER — FENTANYL CITRATE 50 UG/ML
100 INJECTION, SOLUTION INTRAMUSCULAR; INTRAVENOUS
Status: DISCONTINUED | OUTPATIENT
Start: 2021-06-17 | End: 2021-06-17 | Stop reason: HOSPADM

## 2021-06-17 RX ADMIN — FENTANYL CITRATE 50 MCG: 50 INJECTION, SOLUTION INTRAMUSCULAR; INTRAVENOUS at 10:18

## 2021-06-17 RX ADMIN — MIDAZOLAM 1 MG: 1 INJECTION INTRAMUSCULAR; INTRAVENOUS at 10:18

## 2021-06-17 RX ADMIN — PHENYLEPHRINE HYDROCHLORIDE 100 MCG: 10 INJECTION INTRAVENOUS at 12:21

## 2021-06-17 RX ADMIN — PHENYLEPHRINE HYDROCHLORIDE 150 MCG: 10 INJECTION INTRAVENOUS at 12:37

## 2021-06-17 RX ADMIN — ONDANSETRON 4 MG: 2 INJECTION INTRAMUSCULAR; INTRAVENOUS at 14:21

## 2021-06-17 RX ADMIN — DEXAMETHASONE SODIUM PHOSPHATE 10 MG: 10 INJECTION INTRAMUSCULAR; INTRAVENOUS at 12:18

## 2021-06-17 RX ADMIN — LIDOCAINE HYDROCHLORIDE 0.5 ML: 10 INJECTION, SOLUTION EPIDURAL; INFILTRATION; INTRACAUDAL; PERINEURAL at 10:12

## 2021-06-17 RX ADMIN — SODIUM CHLORIDE, POTASSIUM CHLORIDE, SODIUM LACTATE AND CALCIUM CHLORIDE 9 ML/HR: 600; 310; 30; 20 INJECTION, SOLUTION INTRAVENOUS at 10:12

## 2021-06-17 RX ADMIN — PHENYLEPHRINE HYDROCHLORIDE 100 MCG: 10 INJECTION INTRAVENOUS at 12:32

## 2021-06-17 RX ADMIN — ROPIVACAINE HYDROCHLORIDE 10 ML: 2 INJECTION, SOLUTION EPIDURAL; INFILTRATION at 10:29

## 2021-06-17 RX ADMIN — ESMOLOL HYDROCHLORIDE 25 MG: 100 INJECTION, SOLUTION INTRAVENOUS at 12:04

## 2021-06-17 RX ADMIN — ROCURONIUM BROMIDE 40 MG: 50 INJECTION INTRAVENOUS at 12:07

## 2021-06-17 RX ADMIN — PHENYLEPHRINE HYDROCHLORIDE 100 MCG: 10 INJECTION INTRAVENOUS at 12:42

## 2021-06-17 RX ADMIN — MIDAZOLAM 1 MG: 1 INJECTION INTRAMUSCULAR; INTRAVENOUS at 10:20

## 2021-06-17 RX ADMIN — PHENYLEPHRINE HYDROCHLORIDE 200 MCG: 10 INJECTION INTRAVENOUS at 12:46

## 2021-06-17 RX ADMIN — PHENYLEPHRINE HYDROCHLORIDE 200 MCG: 10 INJECTION INTRAVENOUS at 12:47

## 2021-06-17 RX ADMIN — ROPIVACAINE HYDROCHLORIDE 20 ML: 5 INJECTION, SOLUTION EPIDURAL; INFILTRATION; PERINEURAL at 10:29

## 2021-06-17 RX ADMIN — PHENYLEPHRINE HYDROCHLORIDE 200 MCG: 10 INJECTION INTRAVENOUS at 12:40

## 2021-06-17 RX ADMIN — LIDOCAINE HYDROCHLORIDE 80 MG: 20 INJECTION, SOLUTION INFILTRATION; PERINEURAL at 12:07

## 2021-06-17 RX ADMIN — ONDANSETRON 4 MG: 2 INJECTION INTRAMUSCULAR; INTRAVENOUS at 12:54

## 2021-06-17 RX ADMIN — CEFAZOLIN SODIUM 2 G: 2 INJECTION, SOLUTION INTRAVENOUS at 12:11

## 2021-06-17 RX ADMIN — DEXAMETHASONE SODIUM PHOSPHATE 4 MG: 4 INJECTION, SOLUTION INTRAMUSCULAR; INTRAVENOUS at 10:29

## 2021-06-17 RX ADMIN — GLYCOPYRROLATE 0.2 MG: 0.2 INJECTION INTRAMUSCULAR; INTRAVENOUS at 10:32

## 2021-06-17 RX ADMIN — SODIUM CHLORIDE, POTASSIUM CHLORIDE, SODIUM LACTATE AND CALCIUM CHLORIDE 9 ML/HR: 600; 310; 30; 20 INJECTION, SOLUTION INTRAVENOUS at 14:22

## 2021-06-17 RX ADMIN — SUGAMMADEX 200 MG: 100 INJECTION, SOLUTION INTRAVENOUS at 12:58

## 2021-06-17 RX ADMIN — PROPOFOL 150 MG: 10 INJECTION, EMULSION INTRAVENOUS at 12:07

## 2021-06-17 NOTE — OP NOTE
ORPROCDYN@  Procedure Note    Marina Hugo  6/17/2021    Pre-op Diagnosis:   Partial rotator cuff tear right shoulder  Impingement syndrome right shoulder    Post-op Diagnosis:     Post-Op Diagnosis Codes:  Partial rotator cuff tear right shoulder  Impingement syndrome right shoulder  Labral tear chronic right shoulder     Procedure:  1.  Right shoulder arthroscopy with rotator cuff debridement  2.  Right shoulder arthroscopic labral debridement  3.  Right shoulder arthroscopic acromioplasty    Surgeon: Raj Walker MD    Assistant: Martita Burgos CSA     Anesthesia: General with Block  Anesthesiologist: Bennie Akins DO  CRNA: Bettina Raymundo CRNA      Staff:   Circulator: Allie Gomez RN  Scrub Person: Shana Cortes  Vendor Representative: Rock Hdz  Assistant: Martita Burgos    Indication for Assistant: A surgical assistant, Martita Burgos CSA , was utilized during this procedure and was present throughout the entire procedure allowing for safe completion of the procedure, reducing overall operative time and morbidity for the patient.      Complications: None    IV antibiotic: Cefazolin    Implants: None    Procedure: The patient was taken to the operative suite.  After adequate anesthesia was established the upper extremity was prepped and draped in the usual fashion with the patient in a beachchair position.  The head was kept in a neutral position and bony prominences were padded.  A posterior portal was made.  The arthroscope was introduced into the glenohumeral joint.  An anterior portal was made in the rotator interval and diagnostic arthroscopy commenced.  A thorough inspection of the intra-articular structures was carried out.  Extensive tearing of the superior labrum was noted with a type I/II type tear.  Loose fragmented labral tissue was debrided from 10:00 to 2:00 up into the base the biceps tendon but the biceps anchor felt stable.  The glenohumeral  articular cartilage was intact.  The subscapularis was intact.  Significant undersurface tearing of the supraspinatus was diagnosed and debridement was carried out with a full-radius resector.  It did appear however that she had a good overall attachment remaining of the supraspinatus and that there was very punctate narrowing possibly beyond 50% but the majority of the tear was less than 50% in thickness.  Once the labrum and cuff were debrided extensively the arthroscope was placed in the subacromial space.  There was bursal side fraying of the cuff and bursal fragmentation.  An extensive bursectomy was performed.  A large anterior acromial spur was diagnosed and a decompressive anterior acromioplasty was performed with bur shaver and electrocautery until a smooth type I flat acromion was achieved and photographed debridement of the bursal side of the rotator cuff was carried out and this was extending back to the muscle tendinous junction.  Pre and post photographs were obtained.  Probing indicated there was no full-thickness component of the tear.  The subacromial space was vigorously irrigated and suctioned.  The instruments were removed.  The portals were closed with 4-0 nylon interrupted.  A sterile compression dressing and sling were applied.  The patient was awoken and taken to the postanesthetic recovery unit in good condition.    Estimated Blood Loss: minimal    Specimens: * No orders in the log *    Raj Walker MD     Date: 6/17/2021  Time: 12:58 EDT

## 2021-06-17 NOTE — ANESTHESIA PREPROCEDURE EVALUATION
Anesthesia Evaluation     Patient summary reviewed and Nursing notes reviewed   NPO Solid Status: > 8 hours             Airway   Mallampati: II  TM distance: >3 FB  Neck ROM: full  no difficulty expected  Dental - normal exam     Pulmonary - normal exam   (+) asthma,sleep apnea,   Cardiovascular - normal exam    (+) hypertension,       Neuro/Psych- negative ROS  GI/Hepatic/Renal/Endo    (+) morbid obesity,  diabetes mellitus,     Musculoskeletal (-) negative ROS    Abdominal  - normal exam   Substance History - negative use     OB/GYN negative ob/gyn ROS         Other                        Anesthesia Plan    ASA 3     general with block     intravenous induction     Anesthetic plan, all risks, benefits, and alternatives have been provided, discussed and informed consent has been obtained with: patient.    Plan discussed with CRNA.

## 2021-06-17 NOTE — DISCHARGE INSTRUCTIONS
Raj Walker M.D.  [address] [address]  [phone]      Discharge Instructions for SHOULDER SURGERY ROTATOR CUFF OR LABRAL REPAIR    SPECIFIC POST-OP INSTRUCTIONS:  * SUTURES: Sutures are taken out 7-10 days post-op. This will be done during your first post-op appointment in our office. Please call [phone] to make your appointment.   * ICE: Ice helps to decrease both pain & swelling. Ice should be applied to the shoulder 20-25 minutes per hour while awake.   * DRESSING CHANGES: You can change dressing next day after surgery. You can remove tape, white gauze pads and small yellow gauze strips. We ask that you keep the incision clean and dry. Please use water proof Band-Aids to cover incision(s) while showering. These can be purchased at your local pharmacy.  These can be changed daily or as needed. Do not use any ointment on the incision(s).   * SHOWERING: The wound edges typically come together and seal by 3-5 days post-op if there is no drainage. Again, please use waterproof Band-Aids to cover incision(s) while showering. DO NOT SUBMERSE SHOULDER IN POOL, HOT TUB OR BATH UNTIL AT LEAST 3-4 WEEKS POST-OP (EVEN WITH WATERPROOF BANDAIDS).  * PAIN MEDICINE: You will be given a prescription for oral pain medication prior to discharge from the hospital. Please let us know if you have a sensitivity to certain pain medications prior to discharge. Additional pain medication prescriptions can be written, but must be picked up in either our [place] or [place] office locations. They can NOT be called into your pharmacy.   * ORAL ANTI-INFLAMMATORIES:   You will be asked to discontinue ALL oral anti-inflammatories 2 weeks prior to surgery. You can resume these day of surgery - AFTER YOUR PROCEDURE/ONCE YOU ARE HOME.  These can be combined with the oral pain medications safely. DO NOT TAKE ADDITIONAL TYLENOL WITH THE NARCOTIC PAIN MEDICATION (it already has Tylenol in it). If you were not taking an  "anti-inflammatory pre-op, you can start one of them first day post-op. It will help decrease pain and swelling. Typical medications and dosages are as follows:   Advil/Motrin/Ibuprofen 200mg, 4 pills every 8 hours OR Aleve/Naproxen Sodium 220mg, 2 pills every 12 hours  * FOLLOW UP APPOINTMENT: You will need to call our office [phone] and make an appointment to follow up 7-10 days after your date of surgery. We can see you back sooner if there are any problems or concerns.   * SLING: We recommend you wear the sling at ALL times, other than when showering. During your first post-op visit, we will discuss the length of time you will wear the sling based on the specific surgical procedure that was performed.   * WEIGHT BEARING: We ask that you be non-weight bearing on the operative shoulder. Do not use the operative arm to lift/push/pull greater than 5lbs.   * PHYSICAL THERAPY: During your first post-op visit, again, depending on your specific procedure, we will discuss when you will start physical therapy. For a debridement/\"clean up\", physical therapy typically starts 7-10 days post-op. For a Bankart/labral/rotator cuff repair, physical therapy starts around 4-6 weeks. This can be done downstairs at [facility] or at a location of your choice. Physical therapy is typically 2-3 times a week for 4-6 weeks, depending on the procedure, the individual, and his/her progress.   * DRIVING A CAR: Medically/legally we cannot recommend you drive a car while in the sling or while on pain medication.   * RETURNING TO DAILY AND RECREATIONAL ACTIVITIES: For the most part patients can progress to daily activities as tolerated (keeping in mind the restrictions listed above). Initially, we do not want you to \"overdo\" it in an attempt to minimize post-op pain and swelling. Once the swelling is controlled, you can progress with activities as tolerated. Pain and swelling should be your guide to increasing your activity level.   * RETURN " TO WORK: The return to work date depends on many factors and is very dependent on the individual. You would most likely be able to return to a sedentary job after your first post-op visit (5-7 days after surgery). We can discuss specific work restrictions based on your job duties during this visit. A more physical job would obviously require a longer recovery time before return to work.          What to expect after a Nerve Block    Nerve blocks administered to block pain affect many types of nerves, including those nerves that control movement, pain, and normal sensation. Following a nerve block, you may notice some bruising at the site where the block was given. You may experience sensations such as: numbness of the affected area or limb, tingling, heaviness (that is the limb feels heavy to you), weakness or inability to move the affected arm or leg, or a feeling as if your arm or leg has “fallen asleep.”     A nerve block can last from 2 to 36 hours depending on the medications used.  Usually the weakness wears off first followed by the tingling and heaviness. As the block wears off, you may begin to notice pain; however, this sequence of events may occur in any order. Typically, you will be able to move your limb before you will feel it. Once a nerve block begins to wear off, the effects are usually completely gone within 60 minutes.  If you experience continued side effects that you believe are block related for longer than 48 hours, please call your healthcare provider. Please see block-specific instructions below.    Instructions for any block involving the shoulder or arm  • If you have had any kind of shoulder/arm block, you will go home with your arm in a sling. Wear the sling until the block has completely worn off. You may be required to wear it for a longer period of time per your surgeon’s recommendations.  • If you have had a shoulder/arm block, it is a good idea to sleep on a recliner with pillows  under your arm.    You may experience symptoms such as:  Shortness of breath  Hoarseness   Blurry vision  Unequal pupils  Drooping of your face on the same side as the block was performed    These are side effects associated with this kind of block and should go away within 12 hours.    Note: If you have severe or prolonged shortness of breath, please seek medical assistance as soon as possible.     Protection of a “blocked” arm or leg (limb)  • After a nerve block, you cannot feel pain, pressure, or extremes of temperature in the affected limb. And because of this, your blocked limb is at more risk for injury. For example, it is possible to burn your limb on an extremely hot surface without feeling it.     • When resting, it is important to reposition your limb periodically to avoid prolonged pressure on it. This may require the use of pillows and padding.    • While sleeping, you should avoid rolling onto the affected limb or putting too much pressure on it.     • If you have a cast or tight dressing, check the color of your fingers or toes of the affected limb. Call your surgeon if they look discolored (that is, dusky, dark colored).    • Use caution in cold weather. Cover your limb appropriately to protect it from the cold.      Pain Management:    Your surgeon will give you a prescription for pain medication. Begin taking this before the nerve block wears off. Bear in mind that sometimes the block can wear off in the middle of the night.

## 2021-06-17 NOTE — ANESTHESIA PROCEDURE NOTES
Peripheral Block    Pre-sedation assessment completed: 6/17/2021 10:18 AM    Patient reassessed immediately prior to procedure    Patient location during procedure: pre-op  Start time: 6/17/2021 10:18 AM  Stop time: 6/17/2021 10:28 AM  Reason for block: at surgeon's request and post-op pain management  Performed by  Anesthesiologist: Kamlesh Reveles MD  Preanesthetic Checklist  Completed: patient identified, IV checked, site marked, risks and benefits discussed, surgical consent, monitors and equipment checked, pre-op evaluation and timeout performed  Prep:  Pt Position: supine  Sterile barriers:cap, gloves, mask and sterile barriers  Prep: ChloraPrep  Patient monitoring: blood pressure monitoring, continuous pulse oximetry and EKG  Procedure  Sedation:yes  Performed under: local infiltration  Guidance:ultrasound guided  ULTRASOUND INTERPRETATION.  Using ultrasound guidance a 22 G gauge needle was placed in close proximity to the brachial plexus nerve, at which point, under ultrasound guidance anesthetic was injected in the area of the nerve and spread of the anesthesia was seen on ultrasound in close proximity thereto.  There were no abnormalities seen on ultrasound; a digital image was taken; and the patient tolerated the procedure with no complications. Images:still images obtained    Laterality:right  Block Type:interscalene  Injection Technique:single-shot  Needle Type:echogenic  Needle Gauge:22 G  Resistance on Injection: less than 15 psi    Medications Used: dexamethasone (DECADRON) injection, 4 mg  ropivacaine (NAROPIN) 0.5 % injection, 20 mL  ropivacaine (NAROPIN) 0.2 % injection, 10 mL  Med admintered at 6/17/2021 10:29 AM      Post Assessment  Injection Assessment: negative aspiration for heme, no paresthesia on injection and incremental injection  Patient Tolerance:comfortable throughout block  Complications:no

## 2021-06-17 NOTE — ANESTHESIA PROCEDURE NOTES
Airway  Urgency: elective    Date/Time: 6/17/2021 12:12 PM    General Information and Staff    Patient location during procedure: OR  Anesthesiologist: Kamlesh Reveles MD  CRNA: Bettina Raymundo CRNA    Indications and Patient Condition  Indications for airway management: airway protection    Preoxygenated: yes  Mask difficulty assessment: 3 - difficult mask (inadequate, unstable or two providers) +/- NMBA    Final Airway Details  Final airway type: endotracheal airway      Successful airway: ETT  Cuffed: yes   Successful intubation technique: video laryngoscopy  Facilitating devices/methods: intubating stylet  Endotracheal tube insertion site: oral  Blade: CMAC  Blade size: D  ETT size (mm): 7.0  Cormack-Lehane Classification: grade IIb - view of arytenoids or posterior of glottis only  Placement verified by: chest auscultation and capnometry   Measured from: lips  ETT/EBT  to lips (cm): 21  Number of attempts at approach: 1  Assessment: lips, teeth, and gum same as pre-op and atraumatic intubation    Additional Comments  Small mouth opening. CMAC used for first attempt.

## 2021-07-24 NOTE — PROGRESS NOTES
"Subjective   Marina Hugo is a 65 y.o. female.     CC: Hip Pain    History of Present Illness     Pt comes in today c/o hip pain bilaterally for \"some time\", no longer responsive to the Meloxicam. No injury reported. The pain is actually bilateral buttock region and not the lateral hip..       The following portions of the patient's history were reviewed and updated as appropriate: allergies, current medications, past family history, past medical history, past social history, past surgical history and problem list.    Review of Systems   Constitutional: Negative for activity change, chills and fever.   Respiratory: Negative for cough.    Cardiovascular: Negative for chest pain.   Musculoskeletal:        Hip pain   Psychiatric/Behavioral: Negative for dysphoric mood.       /68   Pulse 84   Temp 97.4 °F (36.3 °C) (Oral)   Resp 16   Ht 162.6 cm (64\")   Wt 132 kg (290 lb)   BMI 49.78 kg/m²     Objective   Physical Exam  Constitutional:       General: She is not in acute distress.     Appearance: She is well-developed.   Pulmonary:      Effort: Pulmonary effort is normal.   Neurological:      Mental Status: She is alert and oriented to person, place, and time.   Psychiatric:         Behavior: Behavior normal.         Thought Content: Thought content normal.         Assessment/Plan   Diagnoses and all orders for this visit:    1. Piriformis syndrome of both sides (Primary)  -     celecoxib (CeleBREX) 200 MG capsule; Take 1 capsule by mouth 2 (Two) Times a Day.  Dispense: 180 capsule; Refill: 1  -     Ambulatory Referral to Physical Therapy Evaluate and treat               "

## 2021-07-26 ENCOUNTER — OFFICE VISIT (OUTPATIENT)
Dept: FAMILY MEDICINE CLINIC | Facility: CLINIC | Age: 66
End: 2021-07-26

## 2021-07-26 VITALS
BODY MASS INDEX: 49.51 KG/M2 | SYSTOLIC BLOOD PRESSURE: 111 MMHG | TEMPERATURE: 97.4 F | WEIGHT: 290 LBS | HEART RATE: 84 BPM | DIASTOLIC BLOOD PRESSURE: 68 MMHG | RESPIRATION RATE: 16 BRPM | HEIGHT: 64 IN

## 2021-07-26 DIAGNOSIS — G57.03 PIRIFORMIS SYNDROME OF BOTH SIDES: Primary | ICD-10-CM

## 2021-07-26 PROCEDURE — 99213 OFFICE O/P EST LOW 20 MIN: CPT | Performed by: FAMILY MEDICINE

## 2021-07-26 RX ORDER — CELECOXIB 200 MG/1
200 CAPSULE ORAL 2 TIMES DAILY
Qty: 180 CAPSULE | Refills: 1 | Status: SHIPPED | OUTPATIENT
Start: 2021-07-26 | End: 2021-11-07 | Stop reason: SDUPTHER

## 2021-09-29 ENCOUNTER — TELEPHONE (OUTPATIENT)
Dept: FAMILY MEDICINE CLINIC | Facility: CLINIC | Age: 66
End: 2021-09-29

## 2021-10-02 ENCOUNTER — FLU SHOT (OUTPATIENT)
Dept: FAMILY MEDICINE CLINIC | Facility: CLINIC | Age: 66
End: 2021-10-02

## 2021-10-02 DIAGNOSIS — Z23 NEED FOR INFLUENZA VACCINATION: Primary | ICD-10-CM

## 2021-10-02 PROCEDURE — 90662 IIV NO PRSV INCREASED AG IM: CPT | Performed by: NURSE PRACTITIONER

## 2021-10-02 PROCEDURE — 90471 IMMUNIZATION ADMIN: CPT | Performed by: NURSE PRACTITIONER

## 2021-10-07 DIAGNOSIS — E11.9 TYPE 2 DIABETES MELLITUS WITHOUT COMPLICATION, WITHOUT LONG-TERM CURRENT USE OF INSULIN (HCC): ICD-10-CM

## 2021-10-07 DIAGNOSIS — I10 ESSENTIAL HYPERTENSION: ICD-10-CM

## 2021-10-07 DIAGNOSIS — D64.9 ANEMIA, UNSPECIFIED TYPE: ICD-10-CM

## 2021-10-08 DIAGNOSIS — E11.9 TYPE 2 DIABETES MELLITUS WITHOUT COMPLICATION, WITHOUT LONG-TERM CURRENT USE OF INSULIN (HCC): ICD-10-CM

## 2021-10-08 DIAGNOSIS — I10 ESSENTIAL HYPERTENSION: ICD-10-CM

## 2021-10-08 RX ORDER — PIOGLITAZONEHYDROCHLORIDE 30 MG/1
30 TABLET ORAL NIGHTLY
Qty: 30 TABLET | Refills: 0 | Status: SHIPPED | OUTPATIENT
Start: 2021-10-08 | End: 2021-11-07 | Stop reason: SDUPTHER

## 2021-10-08 RX ORDER — AMLODIPINE BESYLATE 10 MG/1
10 TABLET ORAL NIGHTLY
Qty: 30 TABLET | Refills: 0 | Status: SHIPPED | OUTPATIENT
Start: 2021-10-08 | End: 2021-11-07 | Stop reason: SDUPTHER

## 2021-10-08 RX ORDER — PIOGLITAZONEHYDROCHLORIDE 30 MG/1
30 TABLET ORAL NIGHTLY
Qty: 90 TABLET | OUTPATIENT
Start: 2021-10-08

## 2021-10-08 RX ORDER — METFORMIN HYDROCHLORIDE 500 MG/1
2000 TABLET, EXTENDED RELEASE ORAL NIGHTLY
Qty: 360 TABLET | OUTPATIENT
Start: 2021-10-08

## 2021-10-08 RX ORDER — AMLODIPINE BESYLATE 10 MG/1
10 TABLET ORAL NIGHTLY
Qty: 90 TABLET | OUTPATIENT
Start: 2021-10-08

## 2021-10-08 RX ORDER — FERROUS SULFATE 325(65) MG
325 TABLET ORAL NIGHTLY
Qty: 30 TABLET | Refills: 0 | Status: SHIPPED | OUTPATIENT
Start: 2021-10-08 | End: 2021-11-07 | Stop reason: SDUPTHER

## 2021-10-08 RX ORDER — METFORMIN HYDROCHLORIDE 500 MG/1
2000 TABLET, EXTENDED RELEASE ORAL NIGHTLY
Qty: 120 TABLET | Refills: 0 | Status: SHIPPED | OUTPATIENT
Start: 2021-10-08 | End: 2021-11-07 | Stop reason: SDUPTHER

## 2021-10-15 DIAGNOSIS — I10 ESSENTIAL HYPERTENSION: ICD-10-CM

## 2021-10-18 DIAGNOSIS — I10 ESSENTIAL HYPERTENSION: ICD-10-CM

## 2021-10-18 RX ORDER — VALSARTAN AND HYDROCHLOROTHIAZIDE 160; 12.5 MG/1; MG/1
1 TABLET, FILM COATED ORAL DAILY
Qty: 90 TABLET | OUTPATIENT
Start: 2021-10-18

## 2021-10-18 RX ORDER — VALSARTAN AND HYDROCHLOROTHIAZIDE 160; 12.5 MG/1; MG/1
1 TABLET, FILM COATED ORAL DAILY
Qty: 30 TABLET | Refills: 0 | Status: SHIPPED | OUTPATIENT
Start: 2021-10-18 | End: 2021-11-07 | Stop reason: SDUPTHER

## 2021-10-24 DIAGNOSIS — E78.2 MIXED HYPERLIPIDEMIA: ICD-10-CM

## 2021-10-24 DIAGNOSIS — E11.9 TYPE 2 DIABETES MELLITUS WITHOUT COMPLICATION, WITHOUT LONG-TERM CURRENT USE OF INSULIN (HCC): ICD-10-CM

## 2021-10-25 DIAGNOSIS — E11.9 TYPE 2 DIABETES MELLITUS WITHOUT COMPLICATION, WITHOUT LONG-TERM CURRENT USE OF INSULIN (HCC): ICD-10-CM

## 2021-10-25 DIAGNOSIS — E78.2 MIXED HYPERLIPIDEMIA: ICD-10-CM

## 2021-10-25 RX ORDER — ATORVASTATIN CALCIUM 20 MG/1
20 TABLET, FILM COATED ORAL DAILY
Qty: 90 TABLET | Refills: 0 | Status: SHIPPED | OUTPATIENT
Start: 2021-10-25 | End: 2021-11-07 | Stop reason: SDUPTHER

## 2021-10-25 RX ORDER — GLIMEPIRIDE 4 MG/1
4 TABLET ORAL
Qty: 90 TABLET | Refills: 0 | Status: SHIPPED | OUTPATIENT
Start: 2021-10-25 | End: 2021-11-07 | Stop reason: SDUPTHER

## 2021-10-25 RX ORDER — ATORVASTATIN CALCIUM 20 MG/1
20 TABLET, FILM COATED ORAL DAILY
Qty: 30 TABLET | Refills: 0 | Status: SHIPPED | OUTPATIENT
Start: 2021-10-25 | End: 2021-10-25

## 2021-10-25 RX ORDER — GLIMEPIRIDE 4 MG/1
4 TABLET ORAL
Qty: 30 TABLET | Refills: 0 | Status: SHIPPED | OUTPATIENT
Start: 2021-10-25 | End: 2021-10-25

## 2021-11-07 NOTE — PROGRESS NOTES
Subjective   Marina Hugo is a 66 y.o. female.     History of Present Illness     Chief Complaint:   Chief Complaint   Patient presents with   • Diabetes     med refill due  / no labs    • Hypertension   • Hyperlipidemia   • Arthritis   • Asthma       Marina Hugo 66 y.o. female who presents today for Medical Management of the below listed issues and medication refills. She  has a problem list of   Patient Active Problem List   Diagnosis   • Hypertension   • Asthma   • Diabetes mellitus (HCC)   • Hyperlipidemia   • Irritable bowel syndrome   • Osteoarthritis   • Chronic cough   • Polyp of colon   .  Since the last visit, She has overall felt well.  she has been compliant with   Current Outpatient Medications:   •  Acetaminophen (TYLENOL ARTHRITIS PAIN PO), Take  by mouth 4 (Four) Times a Day As Needed., Disp: , Rfl:   •  albuterol (PROAIR HFA) 108 (90 BASE) MCG/ACT inhaler, Inhale 2 puffs Every 6 (Six) Hours As Needed for Wheezing., Disp: 1 inhaler, Rfl: 0  •  amLODIPine (NORVASC) 10 MG tablet, Take 1 tablet by mouth Every Night., Disp: 90 tablet, Rfl: 1  •  atorvastatin (LIPITOR) 20 MG tablet, Take 1 tablet by mouth Daily., Disp: 90 tablet, Rfl: 1  •  BD Pen Needle Micro U/F 32G X 6 MM misc, USE DAILY WITH VICTOZA, Disp: 100 each, Rfl: 3  •  benzonatate (TESSALON) 200 MG capsule, Take 200 mg by mouth 3 (Three) Times a Day As Needed for Cough., Disp: , Rfl:   •  celecoxib (CeleBREX) 200 MG capsule, Take 1 capsule by mouth 2 (Two) Times a Day., Disp: 180 capsule, Rfl: 1  •  cetirizine (zyrTEC) 10 MG tablet, Take 10 mg by mouth Every Night., Disp: , Rfl:   •  dicyclomine (BENTYL) 10 MG capsule, Take 1 capsule by mouth 2 (Two) Times a Day., Disp: 180 capsule, Rfl: 1  •  empagliflozin (Jardiance) 10 MG tablet tablet, Take 1 tablet by mouth Every Night., Disp: 90 tablet, Rfl: 1  •  ferrous sulfate (FeroSul) 325 (65 FE) MG tablet, Take 1 tablet by mouth Every Night. With food, Disp: 90 tablet, Rfl: 1  •   "Fluticasone-Umeclidin-Vilant (Trelegy Ellipta) 200-62.5-25 MCG/INH inhaler, Inhale 1 puff Daily., Disp: 3 each, Rfl: 1  •  glimepiride (AMARYL) 4 MG tablet, Take 1 tablet by mouth Every Morning Before Breakfast., Disp: 90 tablet, Rfl: 1  •  Insulin Glargine, 1 Unit Dial, (Toujeo SoloStar) 300 UNIT/ML solution pen-injector injection, Inject 20 Units under the skin into the appropriate area as directed Daily., Disp: 6 pen, Rfl: 1  •  metFORMIN ER (GLUCOPHAGE-XR) 500 MG 24 hr tablet, Take 4 tablets by mouth Every Night., Disp: 360 tablet, Rfl: 1  •  oxyCODONE-acetaminophen (PERCOCET) 7.5-325 MG per tablet, Take 1 tablet by mouth Every 4 (Four) Hours As Needed (Pain)., Disp: 28 tablet, Rfl: 0  •  pioglitazone (ACTOS) 30 MG tablet, Take 1 tablet by mouth Every Night., Disp: 90 tablet, Rfl: 1  •  valsartan-hydrochlorothiazide (DIOVAN-HCT) 160-12.5 MG per tablet, Take 1 tablet by mouth Daily., Disp: 90 tablet, Rfl: 1.  She denies medication side effects.    All of the other chronic condition(s) listed above are stable w/o issues.    /85   Pulse 92   Temp 97.5 °F (36.4 °C) (Oral)   Resp 18   Ht 162.6 cm (64\")   Wt 132 kg (292 lb)   BMI 50.12 kg/m²     Results for orders placed or performed during the hospital encounter of 06/17/21   POC Glucose Once    Specimen: Blood   Result Value Ref Range    Glucose 90 70 - 130 mg/dL             The following portions of the patient's history were reviewed and updated as appropriate: allergies, current medications, past family history, past medical history, past social history, past surgical history, and problem list.    Review of Systems   Constitutional: Negative for activity change, chills and fever.   Respiratory: Negative for cough.    Cardiovascular: Negative for chest pain.   Psychiatric/Behavioral: Negative for dysphoric mood.       Objective   Physical Exam  Constitutional:       General: She is not in acute distress.     Appearance: She is well-developed. "   Cardiovascular:      Rate and Rhythm: Normal rate and regular rhythm.   Pulmonary:      Effort: Pulmonary effort is normal.      Breath sounds: Normal breath sounds.   Neurological:      Mental Status: She is alert and oriented to person, place, and time.   Psychiatric:         Behavior: Behavior normal.         Thought Content: Thought content normal.           Assessment/Plan   Diagnoses and all orders for this visit:    1. Type 2 diabetes mellitus without complication, without long-term current use of insulin (HCC) (Primary)  -     metFORMIN ER (GLUCOPHAGE-XR) 500 MG 24 hr tablet; Take 4 tablets by mouth Every Night.  Dispense: 360 tablet; Refill: 1  -     Insulin Glargine, 1 Unit Dial, (Toujeo SoloStar) 300 UNIT/ML solution pen-injector injection; Inject 20 Units under the skin into the appropriate area as directed Daily.  Dispense: 6 pen; Refill: 1  -     pioglitazone (ACTOS) 30 MG tablet; Take 1 tablet by mouth Every Night.  Dispense: 90 tablet; Refill: 1  -     glimepiride (AMARYL) 4 MG tablet; Take 1 tablet by mouth Every Morning Before Breakfast.  Dispense: 90 tablet; Refill: 1  -     empagliflozin (Jardiance) 10 MG tablet tablet; Take 1 tablet by mouth Every Night.  Dispense: 90 tablet; Refill: 1  -     Basic Metabolic Panel  -     Hemoglobin A1c  -     Lipid Panel    2. Moderate persistent asthma with acute exacerbation  -     Fluticasone-Umeclidin-Vilant (Trelegy Ellipta) 200-62.5-25 MCG/INH inhaler; Inhale 1 puff Daily.  Dispense: 3 each; Refill: 1    3. Anemia, unspecified type  -     ferrous sulfate (FeroSul) 325 (65 FE) MG tablet; Take 1 tablet by mouth Every Night. With food  Dispense: 90 tablet; Refill: 1    4. Essential hypertension  -     valsartan-hydrochlorothiazide (DIOVAN-HCT) 160-12.5 MG per tablet; Take 1 tablet by mouth Daily.  Dispense: 90 tablet; Refill: 1  -     amLODIPine (NORVASC) 10 MG tablet; Take 1 tablet by mouth Every Night.  Dispense: 90 tablet; Refill: 1    5. Mixed  hyperlipidemia  -     atorvastatin (LIPITOR) 20 MG tablet; Take 1 tablet by mouth Daily.  Dispense: 90 tablet; Refill: 1    6. Piriformis syndrome of both sides  -     celecoxib (CeleBREX) 200 MG capsule; Take 1 capsule by mouth 2 (Two) Times a Day.  Dispense: 180 capsule; Refill: 1    7. Irritable bowel syndrome without diarrhea  -     dicyclomine (BENTYL) 10 MG capsule; Take 1 capsule by mouth 2 (Two) Times a Day.  Dispense: 180 capsule; Refill: 1

## 2021-11-08 ENCOUNTER — OFFICE VISIT (OUTPATIENT)
Dept: FAMILY MEDICINE CLINIC | Facility: CLINIC | Age: 66
End: 2021-11-08

## 2021-11-08 VITALS
DIASTOLIC BLOOD PRESSURE: 85 MMHG | SYSTOLIC BLOOD PRESSURE: 134 MMHG | TEMPERATURE: 97.5 F | RESPIRATION RATE: 18 BRPM | HEIGHT: 64 IN | BODY MASS INDEX: 49.85 KG/M2 | WEIGHT: 292 LBS | HEART RATE: 92 BPM

## 2021-11-08 DIAGNOSIS — J45.41 MODERATE PERSISTENT ASTHMA WITH ACUTE EXACERBATION: Chronic | ICD-10-CM

## 2021-11-08 DIAGNOSIS — E11.9 TYPE 2 DIABETES MELLITUS WITHOUT COMPLICATION, WITHOUT LONG-TERM CURRENT USE OF INSULIN (HCC): Primary | Chronic | ICD-10-CM

## 2021-11-08 DIAGNOSIS — I10 ESSENTIAL HYPERTENSION: Chronic | ICD-10-CM

## 2021-11-08 DIAGNOSIS — D64.9 ANEMIA, UNSPECIFIED TYPE: Chronic | ICD-10-CM

## 2021-11-08 DIAGNOSIS — K58.9 IRRITABLE BOWEL SYNDROME WITHOUT DIARRHEA: Chronic | ICD-10-CM

## 2021-11-08 DIAGNOSIS — E78.2 MIXED HYPERLIPIDEMIA: Chronic | ICD-10-CM

## 2021-11-08 DIAGNOSIS — G57.03 PIRIFORMIS SYNDROME OF BOTH SIDES: Chronic | ICD-10-CM

## 2021-11-08 PROCEDURE — 99214 OFFICE O/P EST MOD 30 MIN: CPT | Performed by: FAMILY MEDICINE

## 2021-11-08 RX ORDER — PIOGLITAZONEHYDROCHLORIDE 30 MG/1
30 TABLET ORAL NIGHTLY
Qty: 90 TABLET | Refills: 1 | Status: SHIPPED | OUTPATIENT
Start: 2021-11-08 | End: 2022-03-24 | Stop reason: SDUPTHER

## 2021-11-08 RX ORDER — CELECOXIB 200 MG/1
200 CAPSULE ORAL 2 TIMES DAILY
Qty: 180 CAPSULE | Refills: 1 | Status: SHIPPED | OUTPATIENT
Start: 2021-11-08 | End: 2022-03-24 | Stop reason: SDUPTHER

## 2021-11-08 RX ORDER — FERROUS SULFATE 325(65) MG
325 TABLET ORAL NIGHTLY
Qty: 90 TABLET | Refills: 1 | Status: SHIPPED | OUTPATIENT
Start: 2021-11-08 | End: 2022-03-24 | Stop reason: SDUPTHER

## 2021-11-08 RX ORDER — AMLODIPINE BESYLATE 10 MG/1
10 TABLET ORAL NIGHTLY
Qty: 90 TABLET | Refills: 1 | Status: SHIPPED | OUTPATIENT
Start: 2021-11-08 | End: 2022-03-24 | Stop reason: SDUPTHER

## 2021-11-08 RX ORDER — ATORVASTATIN CALCIUM 20 MG/1
20 TABLET, FILM COATED ORAL DAILY
Qty: 90 TABLET | Refills: 1 | Status: SHIPPED | OUTPATIENT
Start: 2021-11-08 | End: 2022-03-24 | Stop reason: SDUPTHER

## 2021-11-08 RX ORDER — DICYCLOMINE HYDROCHLORIDE 10 MG/1
10 CAPSULE ORAL 2 TIMES DAILY
Qty: 180 CAPSULE | Refills: 1 | Status: SHIPPED | OUTPATIENT
Start: 2021-11-08 | End: 2022-03-24 | Stop reason: SDUPTHER

## 2021-11-08 RX ORDER — VALSARTAN AND HYDROCHLOROTHIAZIDE 160; 12.5 MG/1; MG/1
1 TABLET, FILM COATED ORAL DAILY
Qty: 90 TABLET | Refills: 1 | Status: SHIPPED | OUTPATIENT
Start: 2021-11-08 | End: 2022-03-24 | Stop reason: SDUPTHER

## 2021-11-08 RX ORDER — METFORMIN HYDROCHLORIDE 500 MG/1
2000 TABLET, EXTENDED RELEASE ORAL NIGHTLY
Qty: 360 TABLET | Refills: 1 | Status: SHIPPED | OUTPATIENT
Start: 2021-11-08 | End: 2022-03-24 | Stop reason: SDUPTHER

## 2021-11-08 RX ORDER — INSULIN GLARGINE 300 U/ML
20 INJECTION, SOLUTION SUBCUTANEOUS DAILY
Qty: 6 PEN | Refills: 1 | Status: SHIPPED | OUTPATIENT
Start: 2021-11-08 | End: 2022-03-24 | Stop reason: SDUPTHER

## 2021-11-08 RX ORDER — GLIMEPIRIDE 4 MG/1
4 TABLET ORAL
Qty: 90 TABLET | Refills: 1 | Status: SHIPPED | OUTPATIENT
Start: 2021-11-08 | End: 2022-03-24 | Stop reason: SDUPTHER

## 2021-11-24 LAB
BUN SERPL-MCNC: 17 MG/DL (ref 8–27)
BUN/CREAT SERPL: 21 (ref 12–28)
CALCIUM SERPL-MCNC: 10.5 MG/DL (ref 8.7–10.3)
CHLORIDE SERPL-SCNC: 100 MMOL/L (ref 96–106)
CHOLEST SERPL-MCNC: 145 MG/DL (ref 100–199)
CO2 SERPL-SCNC: 22 MMOL/L (ref 20–29)
CREAT SERPL-MCNC: 0.82 MG/DL (ref 0.57–1)
GLUCOSE SERPL-MCNC: 59 MG/DL (ref 65–99)
HBA1C MFR BLD: 6.9 % (ref 4.8–5.6)
HDLC SERPL-MCNC: 66 MG/DL
LDLC SERPL CALC-MCNC: 64 MG/DL (ref 0–99)
POTASSIUM SERPL-SCNC: 4.4 MMOL/L (ref 3.5–5.2)
SODIUM SERPL-SCNC: 141 MMOL/L (ref 134–144)
TRIGL SERPL-MCNC: 80 MG/DL (ref 0–149)
VLDLC SERPL CALC-MCNC: 15 MG/DL (ref 5–40)

## 2022-02-15 VITALS — WEIGHT: 292 LBS | HEIGHT: 64 IN | BODY MASS INDEX: 49.85 KG/M2

## 2022-02-15 NOTE — PROGRESS NOTES
Subjective   Marina Hugo is a 66 y.o. female.     CC: Telehealth for URI-Sx    History of Present Illness     Pt seen today on a VV c/o cough/congestion/fatigue/HA for the past 4 days. Pt with long h/o recurrent bronchitis. No f/c/dyspnea. Does have some wheezing.    Pt took a COVID test that was NEGATIVE.      The following portions of the patient's history were reviewed and updated as appropriate: allergies, current medications, past family history, past medical history, past social history, past surgical history and problem list.    Review of Systems   Constitutional: Positive for fatigue. Negative for activity change, chills and fever.   HENT: Positive for congestion and postnasal drip.    Respiratory: Positive for cough and wheezing. Negative for shortness of breath.    Cardiovascular: Negative for chest pain.   Psychiatric/Behavioral: Negative for dysphoric mood.       Objective   Physical Exam  Constitutional:       General: She is not in acute distress.     Appearance: She is well-developed.   Pulmonary:      Effort: Pulmonary effort is normal.   Neurological:      Mental Status: She is alert and oriented to person, place, and time.   Psychiatric:         Behavior: Behavior normal.         Thought Content: Thought content normal.         Assessment/Plan   Diagnoses and all orders for this visit:    1. Asthmatic bronchitis, moderate persistent, with acute exacerbation (Primary)  -     doxycycline (VIBRAMYICN) 100 MG tablet; Take 1 tablet by mouth 2 (Two) Times a Day.  Dispense: 20 tablet; Refill: 0  -     predniSONE (DELTASONE) 20 MG tablet; Take 3 tabs QDPC x 3 days then 2 tabs QDPC x 4 days then 1 tab QDPC x 3 days  Dispense: 20 tablet; Refill: 0  -     albuterol sulfate HFA (ProAir HFA) 108 (90 Base) MCG/ACT inhaler; Inhale 2 puffs Every 6 (Six) Hours As Needed for Wheezing.  Dispense: 8 g; Refill: 11  -     benzonatate (TESSALON) 200 MG capsule; Take 1 capsule by mouth 3 (Three) Times a Day As Needed  for Cough.  Dispense: 42 capsule; Refill: 11        Spent  11   minutes with chart and interview and consent for this encounter given by the patient.  You have chosen to receive care through a telehealth visit.  Do you consent to use a TELEPHONE connection for your medical care today? Yes

## 2022-02-16 ENCOUNTER — TELEMEDICINE (OUTPATIENT)
Dept: FAMILY MEDICINE CLINIC | Facility: CLINIC | Age: 67
End: 2022-02-16

## 2022-02-16 DIAGNOSIS — J45.41 ASTHMATIC BRONCHITIS, MODERATE PERSISTENT, WITH ACUTE EXACERBATION: Primary | ICD-10-CM

## 2022-02-16 PROCEDURE — 99442 PR PHYS/QHP TELEPHONE EVALUATION 11-20 MIN: CPT | Performed by: FAMILY MEDICINE

## 2022-02-16 RX ORDER — ALBUTEROL SULFATE 90 UG/1
2 AEROSOL, METERED RESPIRATORY (INHALATION) EVERY 6 HOURS PRN
Qty: 8 G | Refills: 11 | Status: SHIPPED | OUTPATIENT
Start: 2022-02-16

## 2022-02-16 RX ORDER — DOXYCYCLINE HYCLATE 100 MG
100 TABLET ORAL 2 TIMES DAILY
Qty: 20 TABLET | Refills: 0 | Status: SHIPPED | OUTPATIENT
Start: 2022-02-16 | End: 2022-03-24

## 2022-02-16 RX ORDER — BENZONATATE 200 MG/1
200 CAPSULE ORAL 3 TIMES DAILY PRN
Qty: 42 CAPSULE | Refills: 11 | Status: SHIPPED | OUTPATIENT
Start: 2022-02-16 | End: 2022-10-27 | Stop reason: SDUPTHER

## 2022-02-16 RX ORDER — PREDNISONE 20 MG/1
TABLET ORAL
Qty: 20 TABLET | Refills: 0 | Status: SHIPPED | OUTPATIENT
Start: 2022-02-16 | End: 2022-03-24

## 2022-03-01 ENCOUNTER — OFFICE VISIT (OUTPATIENT)
Dept: FAMILY MEDICINE CLINIC | Facility: CLINIC | Age: 67
End: 2022-03-01

## 2022-03-01 VITALS
OXYGEN SATURATION: 93 % | RESPIRATION RATE: 16 BRPM | HEIGHT: 64 IN | HEART RATE: 92 BPM | BODY MASS INDEX: 50.02 KG/M2 | SYSTOLIC BLOOD PRESSURE: 113 MMHG | TEMPERATURE: 97.9 F | DIASTOLIC BLOOD PRESSURE: 71 MMHG | WEIGHT: 293 LBS

## 2022-03-01 DIAGNOSIS — J45.41 ASTHMATIC BRONCHITIS, MODERATE PERSISTENT, WITH ACUTE EXACERBATION: Primary | ICD-10-CM

## 2022-03-01 PROCEDURE — 99213 OFFICE O/P EST LOW 20 MIN: CPT | Performed by: FAMILY MEDICINE

## 2022-03-01 RX ORDER — DEXTROMETHORPHAN HYDROBROMIDE AND PROMETHAZINE HYDROCHLORIDE 15; 6.25 MG/5ML; MG/5ML
SYRUP ORAL
Qty: 240 ML | Refills: 1 | Status: SHIPPED | OUTPATIENT
Start: 2022-03-01 | End: 2022-10-27

## 2022-03-01 RX ORDER — LEVOFLOXACIN 500 MG/1
500 TABLET, FILM COATED ORAL DAILY
Qty: 10 TABLET | Refills: 0 | Status: SHIPPED | OUTPATIENT
Start: 2022-03-01 | End: 2022-03-11

## 2022-03-24 ENCOUNTER — OFFICE VISIT (OUTPATIENT)
Dept: FAMILY MEDICINE CLINIC | Facility: CLINIC | Age: 67
End: 2022-03-24

## 2022-03-24 VITALS
TEMPERATURE: 97.3 F | HEIGHT: 64 IN | DIASTOLIC BLOOD PRESSURE: 71 MMHG | SYSTOLIC BLOOD PRESSURE: 127 MMHG | RESPIRATION RATE: 20 BRPM | WEIGHT: 293 LBS | BODY MASS INDEX: 50.02 KG/M2 | HEART RATE: 102 BPM

## 2022-03-24 DIAGNOSIS — J45.41 MODERATE PERSISTENT ASTHMA WITH ACUTE EXACERBATION: Chronic | ICD-10-CM

## 2022-03-24 DIAGNOSIS — D64.9 ANEMIA, UNSPECIFIED TYPE: Chronic | ICD-10-CM

## 2022-03-24 DIAGNOSIS — G57.03 PIRIFORMIS SYNDROME OF BOTH SIDES: Chronic | ICD-10-CM

## 2022-03-24 DIAGNOSIS — I10 ESSENTIAL HYPERTENSION: Chronic | ICD-10-CM

## 2022-03-24 DIAGNOSIS — K58.9 IRRITABLE BOWEL SYNDROME WITHOUT DIARRHEA: Chronic | ICD-10-CM

## 2022-03-24 DIAGNOSIS — E78.2 MIXED HYPERLIPIDEMIA: Chronic | ICD-10-CM

## 2022-03-24 DIAGNOSIS — E11.9 TYPE 2 DIABETES MELLITUS WITHOUT COMPLICATION, WITHOUT LONG-TERM CURRENT USE OF INSULIN: Primary | Chronic | ICD-10-CM

## 2022-03-24 PROCEDURE — 71046 X-RAY EXAM CHEST 2 VIEWS: CPT | Performed by: FAMILY MEDICINE

## 2022-03-24 PROCEDURE — 99214 OFFICE O/P EST MOD 30 MIN: CPT | Performed by: FAMILY MEDICINE

## 2022-03-24 RX ORDER — PIOGLITAZONEHYDROCHLORIDE 30 MG/1
30 TABLET ORAL NIGHTLY
Qty: 90 TABLET | Refills: 1 | Status: SHIPPED | OUTPATIENT
Start: 2022-03-24 | End: 2022-10-24

## 2022-03-24 RX ORDER — METFORMIN HYDROCHLORIDE 500 MG/1
2000 TABLET, EXTENDED RELEASE ORAL NIGHTLY
Qty: 360 TABLET | Refills: 1 | Status: SHIPPED | OUTPATIENT
Start: 2022-03-24 | End: 2022-10-24

## 2022-03-24 RX ORDER — INSULIN GLARGINE 300 U/ML
20 INJECTION, SOLUTION SUBCUTANEOUS DAILY
Qty: 6 PEN | Refills: 1 | Status: SHIPPED | OUTPATIENT
Start: 2022-03-24 | End: 2022-04-11

## 2022-03-24 RX ORDER — AMLODIPINE BESYLATE 10 MG/1
10 TABLET ORAL NIGHTLY
Qty: 90 TABLET | Refills: 1 | Status: SHIPPED | OUTPATIENT
Start: 2022-03-24 | End: 2022-10-24

## 2022-03-24 RX ORDER — DICYCLOMINE HYDROCHLORIDE 10 MG/1
10 CAPSULE ORAL 2 TIMES DAILY
Qty: 180 CAPSULE | Refills: 1 | Status: SHIPPED | OUTPATIENT
Start: 2022-03-24 | End: 2023-03-09

## 2022-03-24 RX ORDER — VALSARTAN AND HYDROCHLOROTHIAZIDE 160; 12.5 MG/1; MG/1
1 TABLET, FILM COATED ORAL DAILY
Qty: 90 TABLET | Refills: 1 | Status: SHIPPED | OUTPATIENT
Start: 2022-03-24 | End: 2022-10-24

## 2022-03-24 RX ORDER — FERROUS SULFATE 325(65) MG
325 TABLET ORAL NIGHTLY
Qty: 90 TABLET | Refills: 1 | Status: SHIPPED | OUTPATIENT
Start: 2022-03-24 | End: 2022-10-24

## 2022-03-24 RX ORDER — ATORVASTATIN CALCIUM 20 MG/1
20 TABLET, FILM COATED ORAL DAILY
Qty: 90 TABLET | Refills: 1 | Status: SHIPPED | OUTPATIENT
Start: 2022-03-24 | End: 2022-09-26

## 2022-03-24 RX ORDER — AZITHROMYCIN 500 MG/1
500 TABLET, FILM COATED ORAL DAILY
Qty: 5 TABLET | Refills: 0 | Status: SHIPPED | OUTPATIENT
Start: 2022-03-24 | End: 2022-03-29

## 2022-03-24 RX ORDER — GLIMEPIRIDE 4 MG/1
4 TABLET ORAL
Qty: 90 TABLET | Refills: 1 | Status: SHIPPED | OUTPATIENT
Start: 2022-03-24 | End: 2022-09-26

## 2022-03-24 RX ORDER — CELECOXIB 200 MG/1
200 CAPSULE ORAL 2 TIMES DAILY
Qty: 180 CAPSULE | Refills: 1 | Status: SHIPPED | OUTPATIENT
Start: 2022-03-24 | End: 2022-09-23

## 2022-03-24 NOTE — PROGRESS NOTES
Subjective   Marina Hugo is a 66 y.o. female.     History of Present Illness     Chief Complaint:   Chief Complaint   Patient presents with   • Diabetes   • Hypertension   • Hyperlipidemia   • Asthma   • Anemia   • Irritable Bowel Syndrome   • Cough     Follow up per pt    • Immunizations     To discuss per pt        Marina Hugo 66 y.o. female who presents today for Medical Management of the below listed issues. She  has a problem list of   Patient Active Problem List   Diagnosis   • Hypertension   • Asthma   • Diabetes mellitus (HCC)   • Hyperlipidemia   • Irritable bowel syndrome   • Osteoarthritis   • Chronic cough   • Polyp of colon   .  Since the last visit, She has overall felt well.  she has been compliant with   Current Outpatient Medications:   •  amLODIPine (NORVASC) 10 MG tablet, Take 1 tablet by mouth Every Night., Disp: 90 tablet, Rfl: 1  •  atorvastatin (LIPITOR) 20 MG tablet, Take 1 tablet by mouth Daily., Disp: 90 tablet, Rfl: 1  •  celecoxib (CeleBREX) 200 MG capsule, Take 1 capsule by mouth 2 (Two) Times a Day., Disp: 180 capsule, Rfl: 1  •  dicyclomine (BENTYL) 10 MG capsule, Take 1 capsule by mouth 2 (Two) Times a Day., Disp: 180 capsule, Rfl: 1  •  empagliflozin (Jardiance) 10 MG tablet tablet, Take 1 tablet by mouth Every Night., Disp: 90 tablet, Rfl: 1  •  ferrous sulfate (FeroSul) 325 (65 FE) MG tablet, Take 1 tablet by mouth Every Night. With food, Disp: 90 tablet, Rfl: 1  •  Fluticasone-Umeclidin-Vilant (Trelegy Ellipta) 200-62.5-25 MCG/INH inhaler, Inhale 1 puff Daily., Disp: 3 each, Rfl: 1  •  glimepiride (AMARYL) 4 MG tablet, Take 1 tablet by mouth Every Morning Before Breakfast., Disp: 90 tablet, Rfl: 1  •  Insulin Glargine, 1 Unit Dial, (Toujeo SoloStar) 300 UNIT/ML solution pen-injector injection, Inject 20 Units under the skin into the appropriate area as directed Daily., Disp: 6 pen, Rfl: 1  •  metFORMIN ER (GLUCOPHAGE-XR) 500 MG 24 hr tablet, Take 4 tablets by mouth Every  "Night., Disp: 360 tablet, Rfl: 1  •  pioglitazone (ACTOS) 30 MG tablet, Take 1 tablet by mouth Every Night., Disp: 90 tablet, Rfl: 1  •  valsartan-hydrochlorothiazide (DIOVAN-HCT) 160-12.5 MG per tablet, Take 1 tablet by mouth Daily., Disp: 90 tablet, Rfl: 1  •  Acetaminophen (TYLENOL ARTHRITIS PAIN PO), Take  by mouth 4 (Four) Times a Day As Needed., Disp: , Rfl:   •  albuterol sulfate HFA (ProAir HFA) 108 (90 Base) MCG/ACT inhaler, Inhale 2 puffs Every 6 (Six) Hours As Needed for Wheezing., Disp: 8 g, Rfl: 11  •  azithromycin (Zithromax) 500 MG tablet, Take 1 tablet by mouth Daily for 5 days., Disp: 5 tablet, Rfl: 0  •  BD Pen Needle Micro U/F 32G X 6 MM misc, USE DAILY WITH VICTOZA, Disp: 100 each, Rfl: 3  •  benzonatate (TESSALON) 200 MG capsule, Take 1 capsule by mouth 3 (Three) Times a Day As Needed for Cough., Disp: 42 capsule, Rfl: 11  •  cetirizine (zyrTEC) 10 MG tablet, Take 10 mg by mouth Every Night., Disp: , Rfl:   •  oxyCODONE-acetaminophen (PERCOCET) 7.5-325 MG per tablet, Take 1 tablet by mouth Every 4 (Four) Hours As Needed (Pain)., Disp: 28 tablet, Rfl: 0  •  promethazine-dextromethorphan (PROMETHAZINE-DM) 6.25-15 MG/5ML syrup, Take 1-2 tsp QHS prn, Disp: 240 mL, Rfl: 1.  She denies medication side effects.    All of the other chronic condition(s) listed above are stable w/o issues.    /71   Pulse 102   Temp 97.3 °F (36.3 °C) (Oral)   Resp 20   Ht 162.6 cm (64\")   Wt 133 kg (293 lb)   BMI 50.29 kg/m²     Results for orders placed or performed in visit on 11/08/21   Basic Metabolic Panel    Specimen: Blood   Result Value Ref Range    Glucose 59 (L) 65 - 99 mg/dL    BUN 17 8 - 27 mg/dL    Creatinine 0.82 0.57 - 1.00 mg/dL    eGFR Non African Am 74 >59 mL/min/1.73    eGFR African Am 85 >59 mL/min/1.73    BUN/Creatinine Ratio 21 12 - 28    Sodium 141 134 - 144 mmol/L    Potassium 4.4 3.5 - 5.2 mmol/L    Chloride 100 96 - 106 mmol/L    Total CO2 22 20 - 29 mmol/L    Calcium 10.5 (H) 8.7 - " 10.3 mg/dL   Hemoglobin A1c    Specimen: Blood   Result Value Ref Range    Hemoglobin A1C 6.9 (H) 4.8 - 5.6 %   Lipid Panel    Specimen: Blood   Result Value Ref Range    Total Cholesterol 145 100 - 199 mg/dL    Triglycerides 80 0 - 149 mg/dL    HDL Cholesterol 66 >39 mg/dL    VLDL Cholesterol J Carlos 15 5 - 40 mg/dL    LDL Chol Calc (NIH) 64 0 - 99 mg/dL             The following portions of the patient's history were reviewed and updated as appropriate: allergies, current medications, past family history, past medical history, past social history, past surgical history, and problem list.    Review of Systems   Constitutional: Negative for activity change, chills and fever.   Respiratory: Positive for cough (persistent after two rounds of abx and cough syrup).    Cardiovascular: Negative for chest pain.   Psychiatric/Behavioral: Negative for dysphoric mood.       Objective   Physical Exam  Constitutional:       General: She is not in acute distress.     Appearance: She is well-developed.   Cardiovascular:      Rate and Rhythm: Normal rate and regular rhythm.   Pulmonary:      Effort: Pulmonary effort is normal.      Breath sounds: Rales (diminshed BS left base) present.   Neurological:      Mental Status: She is alert and oriented to person, place, and time.   Psychiatric:         Behavior: Behavior normal.         Thought Content: Thought content normal.     CXR: ordered due to continued cough, no comparison, read by me: opacity of the left base and right cardiac angle (plural effusion vs pneumonia)       Assessment/Plan   Diagnoses and all orders for this visit:    1. Type 2 diabetes mellitus without complication, without long-term current use of insulin (HCC) (Primary)  -     glimepiride (AMARYL) 4 MG tablet; Take 1 tablet by mouth Every Morning Before Breakfast.  Dispense: 90 tablet; Refill: 1  -     Insulin Glargine, 1 Unit Dial, (Toujeo SoloStar) 300 UNIT/ML solution pen-injector injection; Inject 20 Units under  the skin into the appropriate area as directed Daily.  Dispense: 6 pen; Refill: 1  -     empagliflozin (Jardiance) 10 MG tablet tablet; Take 1 tablet by mouth Every Night.  Dispense: 90 tablet; Refill: 1  -     metFORMIN ER (GLUCOPHAGE-XR) 500 MG 24 hr tablet; Take 4 tablets by mouth Every Night.  Dispense: 360 tablet; Refill: 1  -     pioglitazone (ACTOS) 30 MG tablet; Take 1 tablet by mouth Every Night.  Dispense: 90 tablet; Refill: 1  -     Comprehensive metabolic panel  -     Lipid panel  -     Hemoglobin A1c  -     MicroAlbumin, Urine, Random - Urine, Clean Catch    2. Moderate persistent asthma with acute exacerbation  Comments:  with continued cough  Orders:  -     Fluticasone-Umeclidin-Vilant (Trelegy Ellipta) 200-62.5-25 MCG/INH inhaler; Inhale 1 puff Daily.  Dispense: 3 each; Refill: 1  -     azithromycin (Zithromax) 500 MG tablet; Take 1 tablet by mouth Daily for 5 days.  Dispense: 5 tablet; Refill: 0  -     XR Chest 2 View    3. Anemia, unspecified type  -     ferrous sulfate (FeroSul) 325 (65 FE) MG tablet; Take 1 tablet by mouth Every Night. With food  Dispense: 90 tablet; Refill: 1    4. Irritable bowel syndrome without diarrhea  -     dicyclomine (BENTYL) 10 MG capsule; Take 1 capsule by mouth 2 (Two) Times a Day.  Dispense: 180 capsule; Refill: 1    5. Mixed hyperlipidemia  -     atorvastatin (LIPITOR) 20 MG tablet; Take 1 tablet by mouth Daily.  Dispense: 90 tablet; Refill: 1  -     Lipid panel    6. Essential hypertension  -     amLODIPine (NORVASC) 10 MG tablet; Take 1 tablet by mouth Every Night.  Dispense: 90 tablet; Refill: 1  -     valsartan-hydrochlorothiazide (DIOVAN-HCT) 160-12.5 MG per tablet; Take 1 tablet by mouth Daily.  Dispense: 90 tablet; Refill: 1  -     Comprehensive metabolic panel  -     Lipid panel  -     CBC and Differential  -     TSH    7. Piriformis syndrome of both sides  -     celecoxib (CeleBREX) 200 MG capsule; Take 1 capsule by mouth 2 (Two) Times a Day.  Dispense: 180  capsule; Refill: 1    Will consider CT Chest pending CXR report.

## 2022-03-28 NOTE — PROGRESS NOTES
Let pt know her CXR read was stable w/o lung concerns, although does have some mild cardiac enlargement. With her health h/o, would recommend a Cardiac consult to look into further. If pt ok with that, please order.

## 2022-03-29 ENCOUNTER — TELEPHONE (OUTPATIENT)
Dept: FAMILY MEDICINE CLINIC | Facility: CLINIC | Age: 67
End: 2022-03-29

## 2022-03-29 DIAGNOSIS — I51.7 CARDIOMEGALY: Primary | ICD-10-CM

## 2022-03-29 NOTE — TELEPHONE ENCOUNTER
Let pt know the CXR came back only showing slightly enlarged heart. Myriad of reasons this may be, so I placed a cardiology consult for her. Please let her know.

## 2022-03-29 NOTE — TELEPHONE ENCOUNTER
When I called the patient with her abnormal CXR results, she said he cough is not better at all.     She wants to know what the next steps is for that.     Thanks  Luci

## 2022-03-29 NOTE — TELEPHONE ENCOUNTER
Pt aware.      She has seen [facility] previously she said and will follow up with them.      Luci

## 2022-04-04 ENCOUNTER — CLINICAL SUPPORT (OUTPATIENT)
Dept: FAMILY MEDICINE CLINIC | Facility: CLINIC | Age: 67
End: 2022-04-04

## 2022-04-04 DIAGNOSIS — Z23 IMMUNIZATION DUE: Primary | ICD-10-CM

## 2022-04-04 PROCEDURE — 90471 IMMUNIZATION ADMIN: CPT | Performed by: FAMILY MEDICINE

## 2022-04-04 PROCEDURE — 90715 TDAP VACCINE 7 YRS/> IM: CPT | Performed by: FAMILY MEDICINE

## 2022-04-04 NOTE — PROGRESS NOTES
Injection  Injection performed in right deltoid by Hayley Wynne. Patient tolerated the procedure well without complications.  04/04/22   Hayley Wynne

## 2022-04-08 ENCOUNTER — OFFICE VISIT (OUTPATIENT)
Dept: CARDIOLOGY | Facility: CLINIC | Age: 67
End: 2022-04-08

## 2022-04-08 VITALS
WEIGHT: 287.6 LBS | HEART RATE: 89 BPM | SYSTOLIC BLOOD PRESSURE: 126 MMHG | HEIGHT: 64 IN | DIASTOLIC BLOOD PRESSURE: 70 MMHG | BODY MASS INDEX: 49.1 KG/M2

## 2022-04-08 DIAGNOSIS — R06.09 DYSPNEA ON EXERTION: Primary | ICD-10-CM

## 2022-04-08 PROCEDURE — 93000 ELECTROCARDIOGRAM COMPLETE: CPT | Performed by: INTERNAL MEDICINE

## 2022-04-08 PROCEDURE — 99204 OFFICE O/P NEW MOD 45 MIN: CPT | Performed by: INTERNAL MEDICINE

## 2022-04-08 NOTE — PROGRESS NOTES
[facility]      Patient Name: Marina Hugo  :1955  Age: 66 y.o.  Encounter Provider:  Kamlesh Soriano Jr, MD      Chief Complaint: Evaluation and management of abnormal imaging      HPI  Marina Hugo is a 66 y.o. female past medical history of diabetes, hypertension and dyslipidemia who presents for initial evaluation of abnormal cardiac findings on chest x-ray.  Patient has history of morbid obesity status post gastric sleeve surgery which ultimately had to be removed secondary to erosions.  She is unfortunately gained about 100 pounds since the sleeve was removed but still performs a fair amount of activity.  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  She states with walking she has no chest pain or shortness of air.  She has noted mild rest dyspnea and cough ongoing since February.  She was noted to have mildly enlarged cardiac silhouette on chest x-ray with questionable pleural effusions although this was not mentioned by the radiologist and report.  Images are not available to be reviewed.  Echocardiogram from 2016 showed normal size heart with normal function and no significant valvular heart disease.  Diabetes is fairly well controlled with last A1c 6.9.  She is a lifelong non-smoker who denies alcohol or illicit drug use.  Father had coronary artery bypass surgery in his 70s.      The following portions of the patient's history were reviewed and updated as appropriate: allergies, current medications, past family history, past medical history, past social history, past surgical history and problem list.      Review of Systems   Constitutional: Negative for chills and fever.   HENT: Negative for hoarse voice and sore throat.    Eyes: Negative for double vision and photophobia.   Cardiovascular: Positive for palpitations. Negative for chest pain, leg swelling, near-syncope, orthopnea, paroxysmal nocturnal dyspnea  "and syncope.   Respiratory: Positive for cough. Negative for wheezing.    Skin: Negative for poor wound healing and rash.   Musculoskeletal: Negative for arthritis and joint swelling.   Gastrointestinal: Negative for bloating, abdominal pain, hematemesis and hematochezia.   Neurological: Negative for dizziness and focal weakness.   Psychiatric/Behavioral: Negative for depression and suicidal ideas.       OBJECTIVE:   Vital Signs  Vitals:    04/08/22 1407   BP: 126/70   Pulse: 89     Estimated body mass index is 49.37 kg/m² as calculated from the following:    Height as of this encounter: 162.6 cm (64\").    Weight as of this encounter: 130 kg (287 lb 9.6 oz).    Vitals reviewed.   Constitutional:       Appearance: Healthy appearance. Not in distress.      Comments: Morbidly obese   Neck:      Comments: JVD difficult to assess secondary to body habitus  Pulmonary:      Effort: Pulmonary effort is normal.      Breath sounds: No wheezing. No rhonchi. No rales.   Chest:      Chest wall: Not tender to palpatation.   Cardiovascular:      PMI at left midclavicular line. Normal rate. Regular rhythm. Normal S1. Normal S2.      Murmurs: There is no murmur.      No gallop. No click. No rub.   Pulses:     Intact distal pulses.   Edema:     Peripheral edema absent.   Abdominal:      General: Bowel sounds are normal.      Palpations: Abdomen is soft.      Tenderness: There is no abdominal tenderness.   Musculoskeletal: Normal range of motion.         General: No tenderness. Skin:     General: Skin is warm and dry.   Neurological:      General: No focal deficit present.      Mental Status: Alert and oriented to person, place and time.           ECG 12 Lead    Date/Time: 4/8/2022 4:35 PM  Performed by: Kamlesh Soriano Jr., MD  Authorized by: Kamlesh Soriano Jr., MD   Comparison: not compared with previous ECG   Previous ECG: no previous ECG available  Rhythm: sinus rhythm    Clinical impression: normal ECG                  ASSESSMENT: "     Dyspnea   Chronic bronchitis  Enlarged cardiac silhouette on chest imaging  Morbid obesity  Diabetes  Hypertension  Dyslipidemia    PLAN OF CARE:     1. Dyspnea and cough -more likely related to chronic bronchitis as patient has no heart failure symptoms.  Exertional component of dyspnea is likely multifactorial and related to sedentary lifestyle secondary to osteoarthritis and morbid obesity.  She had a normal echocardiogram in 2016 but given the persistent symptoms we will check a repeat echocardiogram.  EKG is normal.  No ischemic symptoms.  2. Morbid obesity -continue calorie restricted diet and increase activity as tolerated  3. Diabetes -fairly well controlled.  Defer ongoing work-up and management to Dr. Allen.  4. Hypertension -seems fairly well controlled today.  Continue sodium restricted diet.  5. Dyslipidemia -continue statin    Return to clinic 3 months           Discharge Medications          Accurate as of April 8, 2022  2:29 PM. If you have any questions, ask your nurse or doctor.            Continue These Medications      Instructions Start Date   albuterol sulfate  (90 Base) MCG/ACT inhaler  Commonly known as: ProAir HFA   2 puffs, Inhalation, Every 6 Hours PRN      amLODIPine 10 MG tablet  Commonly known as: NORVASC   10 mg, Oral, Nightly      atorvastatin 20 MG tablet  Commonly known as: LIPITOR   20 mg, Oral, Daily      BD Pen Needle Micro U/F 32G X 6 MM misc  Generic drug: Insulin Pen Needle   USE DAILY WITH VICTOZA      benzonatate 200 MG capsule  Commonly known as: TESSALON   200 mg, Oral, 3 Times Daily PRN      celecoxib 200 MG capsule  Commonly known as: CeleBREX   200 mg, Oral, 2 Times Daily      cetirizine 10 MG tablet  Commonly known as: zyrTEC   10 mg, Oral, Nightly      dicyclomine 10 MG capsule  Commonly known as: BENTYL   10 mg, Oral, 2 Times Daily      empagliflozin 10 MG tablet tablet  Commonly known as: Jardiance   10 mg, Oral, Nightly      ferrous sulfate 325 (65 FE)  MG tablet  Commonly known as: FeroSul   325 mg, Oral, Nightly, With food      glimepiride 4 MG tablet  Commonly known as: AMARYL   4 mg, Oral, Every Morning Before Breakfast      metFORMIN  MG 24 hr tablet  Commonly known as: GLUCOPHAGE-XR   2,000 mg, Oral, Nightly      pioglitazone 30 MG tablet  Commonly known as: ACTOS   30 mg, Oral, Nightly      promethazine-dextromethorphan 6.25-15 MG/5ML syrup  Commonly known as: PROMETHAZINE-DM   Take 1-2 tsp QHS prn      Toujeo SoloStar 300 UNIT/ML solution pen-injector injection  Generic drug: Insulin Glargine (1 Unit Dial)   20 Units, Subcutaneous, Daily      Trelegy Ellipta 200-62.5-25 MCG/INH inhaler  Generic drug: Fluticasone-Umeclidin-Vilant   1 puff, Inhalation, Daily      TYLENOL ARTHRITIS PAIN PO   Oral, 4 Times Daily PRN      valsartan-hydrochlorothiazide 160-12.5 MG per tablet  Commonly known as: DIOVAN-HCT   1 tablet, Oral, Daily         Stop These Medications    oxyCODONE-acetaminophen 7.5-325 MG per tablet  Commonly known as: PERCOCET  Stopped by: Kamlesh Soriano Jr, MD            Thank you for allowing me to participate in the care of your patient,      Sincerely,   Kamlesh Soriano Jr, MD  [facility]  04/08/22  14:29 EDT

## 2022-04-09 DIAGNOSIS — E11.9 TYPE 2 DIABETES MELLITUS WITHOUT COMPLICATION, WITHOUT LONG-TERM CURRENT USE OF INSULIN: Chronic | ICD-10-CM

## 2022-04-11 RX ORDER — INSULIN GLARGINE 300 U/ML
INJECTION, SOLUTION SUBCUTANEOUS
Qty: 6 PEN | Refills: 1 | Status: SHIPPED | OUTPATIENT
Start: 2022-04-11 | End: 2023-03-14 | Stop reason: SDUPTHER

## 2022-04-15 ENCOUNTER — HOSPITAL ENCOUNTER (OUTPATIENT)
Dept: CARDIOLOGY | Facility: HOSPITAL | Age: 67
Discharge: HOME | End: 2022-04-15
Admitting: INTERNAL MEDICINE

## 2022-04-15 VITALS
SYSTOLIC BLOOD PRESSURE: 142 MMHG | WEIGHT: 287 LBS | DIASTOLIC BLOOD PRESSURE: 80 MMHG | BODY MASS INDEX: 47.82 KG/M2 | HEIGHT: 65 IN | OXYGEN SATURATION: 96 % | HEART RATE: 90 BPM

## 2022-04-15 DIAGNOSIS — R06.09 DYSPNEA ON EXERTION: ICD-10-CM

## 2022-04-15 LAB
AORTIC ARCH: 3.1 CM
ASCENDING AORTA: 2.5 CM
BH CV ECHO MEAS - ACS: 2.1 CM
BH CV ECHO MEAS - AO MAX PG: 25.5 MMHG
BH CV ECHO MEAS - AO MEAN PG: 10 MMHG
BH CV ECHO MEAS - AO ROOT DIAM: 3.1 CM
BH CV ECHO MEAS - AO V2 MAX: 252.5 CM/SEC
BH CV ECHO MEAS - AO V2 VTI: 40 CM
BH CV ECHO MEAS - AVA(I,D): 1.63 CM2
BH CV ECHO MEAS - EDV(CUBED): 95.8 ML
BH CV ECHO MEAS - EDV(MOD-SP2): 82 ML
BH CV ECHO MEAS - EDV(MOD-SP4): 68 ML
BH CV ECHO MEAS - EF(MOD-BP): 69.5 %
BH CV ECHO MEAS - EF(MOD-SP2): 68.3 %
BH CV ECHO MEAS - EF(MOD-SP4): 69.1 %
BH CV ECHO MEAS - ESV(CUBED): 22.6 ML
BH CV ECHO MEAS - ESV(MOD-SP2): 26 ML
BH CV ECHO MEAS - ESV(MOD-SP4): 21 ML
BH CV ECHO MEAS - FS: 38.2 %
BH CV ECHO MEAS - IVS/LVPW: 0.96 CM
BH CV ECHO MEAS - IVSD: 1.05 CM
BH CV ECHO MEAS - LAT PEAK E' VEL: 9.8 CM/SEC
BH CV ECHO MEAS - LV DIASTOLIC VOL/BSA (35-75): 29 CM2
BH CV ECHO MEAS - LV MASS(C)D: 173.9 GRAMS
BH CV ECHO MEAS - LV MAX PG: 5.2 MMHG
BH CV ECHO MEAS - LV MEAN PG: 3 MMHG
BH CV ECHO MEAS - LV SYSTOLIC VOL/BSA (12-30): 8.9 CM2
BH CV ECHO MEAS - LV V1 MAX: 114.3 CM/SEC
BH CV ECHO MEAS - LV V1 VTI: 21.9 CM
BH CV ECHO MEAS - LVIDD: 4.6 CM
BH CV ECHO MEAS - LVIDS: 2.8 CM
BH CV ECHO MEAS - LVOT AREA: 3 CM2
BH CV ECHO MEAS - LVOT DIAM: 1.94 CM
BH CV ECHO MEAS - LVPWD: 1.1 CM
BH CV ECHO MEAS - MED PEAK E' VEL: 8.5 CM/SEC
BH CV ECHO MEAS - MR MAX PG: 19.6 MMHG
BH CV ECHO MEAS - MR MAX VEL: 221.3 CM/SEC
BH CV ECHO MEAS - MV A DUR: 0.12 SEC
BH CV ECHO MEAS - MV A MAX VEL: 96.8 CM/SEC
BH CV ECHO MEAS - MV DEC SLOPE: 656.3 CM/SEC2
BH CV ECHO MEAS - MV DEC TIME: 0.17 MSEC
BH CV ECHO MEAS - MV E MAX VEL: 118 CM/SEC
BH CV ECHO MEAS - MV E/A: 1.22
BH CV ECHO MEAS - MV MAX PG: 5.3 MMHG
BH CV ECHO MEAS - MV MEAN PG: 3.2 MMHG
BH CV ECHO MEAS - MV V2 VTI: 30.8 CM
BH CV ECHO MEAS - MVA(VTI): 2.11 CM2
BH CV ECHO MEAS - PA ACC TIME: 0.1 SEC
BH CV ECHO MEAS - PA PR(ACCEL): 33.8 MMHG
BH CV ECHO MEAS - PA V2 MAX: 116.7 CM/SEC
BH CV ECHO MEAS - PULM A REVS DUR: 0.12 SEC
BH CV ECHO MEAS - PULM A REVS VEL: 36.3 CM/SEC
BH CV ECHO MEAS - PULM DIAS VEL: 53.3 CM/SEC
BH CV ECHO MEAS - PULM SYS VEL: 83.6 CM/SEC
BH CV ECHO MEAS - RAP SYSTOLE: 3 MMHG
BH CV ECHO MEAS - RV MAX PG: 4.6 MMHG
BH CV ECHO MEAS - RV V1 MAX: 107.2 CM/SEC
BH CV ECHO MEAS - RV V1 VTI: 19.5 CM
BH CV ECHO MEAS - RVOT DIAM: 2.05 CM
BH CV ECHO MEAS - SI(MOD-SP2): 23.9 ML/M2
BH CV ECHO MEAS - SI(MOD-SP4): 20 ML/M2
BH CV ECHO MEAS - SUP REN AO DIAM: 1.7 CM
BH CV ECHO MEAS - SV(LVOT): 65 ML
BH CV ECHO MEAS - SV(MOD-SP2): 56 ML
BH CV ECHO MEAS - SV(MOD-SP4): 47 ML
BH CV ECHO MEAS - SV(RVOT): 64.4 ML
BH CV ECHO MEAS - TAPSE (>1.6): 2.14 CM
BH CV ECHO MEASUREMENTS AVERAGE E/E' RATIO: 12.9
BH CV XLRA - RV BASE: 3 CM
BH CV XLRA - RV LENGTH: 8.2 CM
BH CV XLRA - RV MID: 2.8 CM
BH CV XLRA - TDI S': 17.1 CM/SEC
LEFT ATRIUM VOLUME INDEX: 16 ML/M2
MAXIMAL PREDICTED HEART RATE: 152 BPM
SINUS: 2.42 CM
STJ: 2.7 CM
STRESS TARGET HR: 129 BPM

## 2022-04-15 PROCEDURE — 93306 TTE W/DOPPLER COMPLETE: CPT | Performed by: INTERNAL MEDICINE

## 2022-04-15 PROCEDURE — 93306 TTE W/DOPPLER COMPLETE: CPT

## 2022-04-15 NOTE — PROGRESS NOTES
Reviewed results with Marina Hugo and patient verbalized understanding.    Thank you,  Nuvia Levi RN  Triage Nurse MG

## 2022-05-12 ENCOUNTER — CLINICAL SUPPORT (OUTPATIENT)
Dept: FAMILY MEDICINE CLINIC | Facility: CLINIC | Age: 67
End: 2022-05-12

## 2022-05-12 DIAGNOSIS — Z23 IMMUNIZATION DUE: Primary | ICD-10-CM

## 2022-05-12 PROCEDURE — 90750 HZV VACC RECOMBINANT IM: CPT | Performed by: FAMILY MEDICINE

## 2022-05-12 NOTE — PROGRESS NOTES
Immunization  Immunization performed in LD by Meghann Ruiz MA. Patient tolerated the procedure well without complications.  05/12/22   Meghann Ruiz MA

## 2022-07-13 ENCOUNTER — IMMUNIZATION (OUTPATIENT)
Dept: FAMILY MEDICINE CLINIC | Facility: CLINIC | Age: 67
End: 2022-07-13

## 2022-07-13 DIAGNOSIS — Z23 IMMUNIZATION DUE: Primary | ICD-10-CM

## 2022-07-13 PROCEDURE — 0054A COVID-19 (PFIZER) 12+ YRS: CPT | Performed by: FAMILY MEDICINE

## 2022-07-13 PROCEDURE — 91305 COVID-19 (PFIZER) 12+ YRS: CPT | Performed by: FAMILY MEDICINE

## 2022-07-13 NOTE — PROGRESS NOTES
Immunization  Immunization performed in LD by Jaquelin Silveira LPN. Patient tolerated the procedure well without complications.  07/13/22   Jaquelin Silveira LPN

## 2022-07-15 DIAGNOSIS — E11.9 TYPE 2 DIABETES MELLITUS WITHOUT COMPLICATION, WITHOUT LONG-TERM CURRENT USE OF INSULIN: Primary | ICD-10-CM

## 2022-07-15 DIAGNOSIS — E83.52 HYPERCALCEMIA: ICD-10-CM

## 2022-07-15 LAB
ALBUMIN SERPL-MCNC: 4.8 G/DL (ref 3.8–4.8)
ALBUMIN/GLOB SERPL: 1.9 {RATIO} (ref 1.2–2.2)
ALP SERPL-CCNC: 90 IU/L (ref 44–121)
ALT SERPL-CCNC: 15 IU/L (ref 0–32)
AST SERPL-CCNC: 16 IU/L (ref 0–40)
BASOPHILS # BLD AUTO: 0.1 X10E3/UL (ref 0–0.2)
BASOPHILS NFR BLD AUTO: 1 %
BILIRUB SERPL-MCNC: 0.5 MG/DL (ref 0–1.2)
BUN SERPL-MCNC: 27 MG/DL (ref 8–27)
BUN/CREAT SERPL: 33 (ref 12–28)
CALCIUM SERPL-MCNC: 10.6 MG/DL (ref 8.7–10.3)
CHLORIDE SERPL-SCNC: 102 MMOL/L (ref 96–106)
CHOLEST SERPL-MCNC: 149 MG/DL (ref 100–199)
CO2 SERPL-SCNC: 20 MMOL/L (ref 20–29)
CREAT SERPL-MCNC: 0.83 MG/DL (ref 0.57–1)
EGFRCR SERPLBLD CKD-EPI 2021: 77 ML/MIN/1.73
EOSINOPHIL # BLD AUTO: 0.4 X10E3/UL (ref 0–0.4)
EOSINOPHIL NFR BLD AUTO: 4 %
ERYTHROCYTE [DISTWIDTH] IN BLOOD BY AUTOMATED COUNT: 12.2 % (ref 11.7–15.4)
GLOBULIN SER CALC-MCNC: 2.5 G/DL (ref 1.5–4.5)
GLUCOSE SERPL-MCNC: 86 MG/DL (ref 65–99)
HBA1C MFR BLD: 7.4 % (ref 4.8–5.6)
HCT VFR BLD AUTO: 47.6 % (ref 34–46.6)
HDLC SERPL-MCNC: 69 MG/DL
HGB BLD-MCNC: 15.8 G/DL (ref 11.1–15.9)
IMM GRANULOCYTES # BLD AUTO: 0 X10E3/UL (ref 0–0.1)
IMM GRANULOCYTES NFR BLD AUTO: 0 %
LDLC SERPL CALC-MCNC: 62 MG/DL (ref 0–99)
LYMPHOCYTES # BLD AUTO: 2.1 X10E3/UL (ref 0.7–3.1)
LYMPHOCYTES NFR BLD AUTO: 19 %
MCH RBC QN AUTO: 31.8 PG (ref 26.6–33)
MCHC RBC AUTO-ENTMCNC: 33.2 G/DL (ref 31.5–35.7)
MCV RBC AUTO: 96 FL (ref 79–97)
MICROALBUMIN UR-MCNC: <3 UG/ML
MONOCYTES # BLD AUTO: 0.7 X10E3/UL (ref 0.1–0.9)
MONOCYTES NFR BLD AUTO: 7 %
NEUTROPHILS # BLD AUTO: 7.4 X10E3/UL (ref 1.4–7)
NEUTROPHILS NFR BLD AUTO: 69 %
PLATELET # BLD AUTO: 289 X10E3/UL (ref 150–450)
POTASSIUM SERPL-SCNC: 4.6 MMOL/L (ref 3.5–5.2)
PROT SERPL-MCNC: 7.3 G/DL (ref 6–8.5)
RBC # BLD AUTO: 4.97 X10E6/UL (ref 3.77–5.28)
SODIUM SERPL-SCNC: 141 MMOL/L (ref 134–144)
TRIGL SERPL-MCNC: 100 MG/DL (ref 0–149)
TSH SERPL DL<=0.005 MIU/L-ACNC: 2.58 UIU/ML (ref 0.45–4.5)
VLDLC SERPL CALC-MCNC: 18 MG/DL (ref 5–40)
WBC # BLD AUTO: 10.8 X10E3/UL (ref 3.4–10.8)

## 2022-08-01 DIAGNOSIS — E11.9 TYPE 2 DIABETES MELLITUS WITHOUT COMPLICATION, WITHOUT LONG-TERM CURRENT USE OF INSULIN: ICD-10-CM

## 2022-08-02 RX ORDER — PEN NEEDLE, DIABETIC 32 GX 1/4"
NEEDLE, DISPOSABLE MISCELLANEOUS
Qty: 100 EACH | Refills: 0 | Status: SHIPPED | OUTPATIENT
Start: 2022-08-02 | End: 2022-11-18

## 2022-09-22 DIAGNOSIS — G57.03 PIRIFORMIS SYNDROME OF BOTH SIDES: Chronic | ICD-10-CM

## 2022-09-23 RX ORDER — CELECOXIB 200 MG/1
CAPSULE ORAL
Qty: 60 CAPSULE | Refills: 0 | Status: SHIPPED | OUTPATIENT
Start: 2022-09-23 | End: 2022-09-26

## 2022-09-25 DIAGNOSIS — E11.9 TYPE 2 DIABETES MELLITUS WITHOUT COMPLICATION, WITHOUT LONG-TERM CURRENT USE OF INSULIN: Chronic | ICD-10-CM

## 2022-09-25 DIAGNOSIS — G57.03 PIRIFORMIS SYNDROME OF BOTH SIDES: Chronic | ICD-10-CM

## 2022-09-26 DIAGNOSIS — E78.2 MIXED HYPERLIPIDEMIA: Chronic | ICD-10-CM

## 2022-09-26 RX ORDER — ATORVASTATIN CALCIUM 20 MG/1
TABLET, FILM COATED ORAL
Qty: 30 TABLET | Refills: 0 | Status: SHIPPED | OUTPATIENT
Start: 2022-09-26 | End: 2022-11-01

## 2022-09-26 RX ORDER — CELECOXIB 200 MG/1
CAPSULE ORAL
Qty: 60 CAPSULE | Refills: 0 | Status: SHIPPED | OUTPATIENT
Start: 2022-09-26 | End: 2022-11-23

## 2022-09-26 RX ORDER — GLIMEPIRIDE 4 MG/1
TABLET ORAL
Qty: 30 TABLET | Refills: 0 | Status: SHIPPED | OUTPATIENT
Start: 2022-09-26 | End: 2022-11-04

## 2022-10-01 ENCOUNTER — FLU SHOT (OUTPATIENT)
Dept: FAMILY MEDICINE CLINIC | Facility: CLINIC | Age: 67
End: 2022-10-01

## 2022-10-01 DIAGNOSIS — Z23 NEED FOR INFLUENZA VACCINATION: Primary | ICD-10-CM

## 2022-10-01 PROCEDURE — 90662 IIV NO PRSV INCREASED AG IM: CPT | Performed by: NURSE PRACTITIONER

## 2022-10-01 PROCEDURE — 90471 IMMUNIZATION ADMIN: CPT | Performed by: NURSE PRACTITIONER

## 2022-10-23 DIAGNOSIS — I10 ESSENTIAL HYPERTENSION: Chronic | ICD-10-CM

## 2022-10-23 DIAGNOSIS — E11.9 TYPE 2 DIABETES MELLITUS WITHOUT COMPLICATION, WITHOUT LONG-TERM CURRENT USE OF INSULIN: Chronic | ICD-10-CM

## 2022-10-23 DIAGNOSIS — D64.9 ANEMIA, UNSPECIFIED TYPE: Chronic | ICD-10-CM

## 2022-10-24 RX ORDER — METFORMIN HYDROCHLORIDE 500 MG/1
2000 TABLET, EXTENDED RELEASE ORAL NIGHTLY
Qty: 360 TABLET | Refills: 1 | Status: SHIPPED | OUTPATIENT
Start: 2022-10-24 | End: 2023-03-14 | Stop reason: SDUPTHER

## 2022-10-24 RX ORDER — EMPAGLIFLOZIN 10 MG/1
TABLET, FILM COATED ORAL
Qty: 30 TABLET | Refills: 0 | Status: SHIPPED | OUTPATIENT
Start: 2022-10-24 | End: 2022-11-22

## 2022-10-24 RX ORDER — FERROUS SULFATE 325(65) MG
325 TABLET ORAL NIGHTLY
Qty: 30 TABLET | Refills: 0 | Status: SHIPPED | OUTPATIENT
Start: 2022-10-24 | End: 2022-11-22

## 2022-10-24 RX ORDER — VALSARTAN AND HYDROCHLOROTHIAZIDE 160; 12.5 MG/1; MG/1
TABLET, FILM COATED ORAL
Qty: 30 TABLET | Refills: 0 | Status: SHIPPED | OUTPATIENT
Start: 2022-10-24 | End: 2022-11-22

## 2022-10-24 RX ORDER — PIOGLITAZONEHYDROCHLORIDE 30 MG/1
TABLET ORAL
Qty: 30 TABLET | Refills: 0 | Status: SHIPPED | OUTPATIENT
Start: 2022-10-24 | End: 2022-11-22

## 2022-10-24 RX ORDER — AMLODIPINE BESYLATE 10 MG/1
TABLET ORAL
Qty: 30 TABLET | Refills: 0 | Status: SHIPPED | OUTPATIENT
Start: 2022-10-24 | End: 2022-11-22

## 2022-10-27 ENCOUNTER — OFFICE VISIT (OUTPATIENT)
Dept: FAMILY MEDICINE CLINIC | Facility: CLINIC | Age: 67
End: 2022-10-27

## 2022-10-27 VITALS
TEMPERATURE: 98.7 F | WEIGHT: 287 LBS | BODY MASS INDEX: 47.82 KG/M2 | RESPIRATION RATE: 16 BRPM | SYSTOLIC BLOOD PRESSURE: 128 MMHG | HEART RATE: 109 BPM | HEIGHT: 65 IN | DIASTOLIC BLOOD PRESSURE: 79 MMHG | OXYGEN SATURATION: 98 %

## 2022-10-27 DIAGNOSIS — R05.9 COUGH, UNSPECIFIED TYPE: ICD-10-CM

## 2022-10-27 DIAGNOSIS — R52 BODY ACHES: ICD-10-CM

## 2022-10-27 DIAGNOSIS — J06.9 UPPER RESPIRATORY TRACT INFECTION, UNSPECIFIED TYPE: ICD-10-CM

## 2022-10-27 DIAGNOSIS — R51.9 NONINTRACTABLE HEADACHE, UNSPECIFIED CHRONICITY PATTERN, UNSPECIFIED HEADACHE TYPE: Primary | ICD-10-CM

## 2022-10-27 PROCEDURE — 99213 OFFICE O/P EST LOW 20 MIN: CPT | Performed by: NURSE PRACTITIONER

## 2022-10-27 RX ORDER — DEXTROMETHORPHAN HYDROBROMIDE AND PROMETHAZINE HYDROCHLORIDE 15; 6.25 MG/5ML; MG/5ML
5 SYRUP ORAL 4 TIMES DAILY PRN
Qty: 118 ML | Refills: 0 | Status: SHIPPED | OUTPATIENT
Start: 2022-10-27 | End: 2022-11-15 | Stop reason: SDUPTHER

## 2022-10-27 RX ORDER — BENZONATATE 200 MG/1
200 CAPSULE ORAL 3 TIMES DAILY PRN
Qty: 42 CAPSULE | Refills: 11 | OUTPATIENT
Start: 2022-10-27 | End: 2023-01-20

## 2022-10-27 RX ORDER — DOXYCYCLINE 100 MG/1
100 TABLET ORAL 2 TIMES DAILY
Qty: 20 TABLET | Refills: 0 | Status: SHIPPED | OUTPATIENT
Start: 2022-10-27 | End: 2022-11-01 | Stop reason: SDUPTHER

## 2022-10-27 NOTE — PROGRESS NOTES
"Chief Complaint  Laryngitis, Headache, Cough, and Generalized Body Aches    Subjective          Marina presents to [facility]  As a 67 year old female c/o a 2 days history of non productive cough, fatigue, headache, body aches and laryngitis.  She denies any fever, no sob or wheezing.  No abd pain, no N/V/D. She is able to eat and keep down fluids.  She is not taking any otc medications.  She  Is a , but has not been around anyone sick that she knows of.  She would like a Covid 19 test today    The following portions of the patient's history were reviewed and updated as appropriate: allergies, current medications, past family history, past medical history, past social history, past surgical history, and problem list    Review of Systems   Constitutional: Positive for fatigue. Negative for chills and fever.   HENT: Positive for congestion, postnasal drip and voice change.    Eyes: Negative for visual disturbance.   Respiratory: Positive for cough. Negative for shortness of breath and wheezing.    Cardiovascular: Negative for chest pain, palpitations and leg swelling.   Gastrointestinal: Negative for abdominal pain, diarrhea, nausea and vomiting.   Musculoskeletal: Positive for myalgias.   Neurological: Negative for dizziness and light-headedness.        Objective   Vital Signs:   Vitals:    10/27/22 1451   BP: 128/79   Pulse: 109   Resp: 16   Temp: 98.7 °F (37.1 °C)   SpO2: 98%   Weight: 130 kg (287 lb)   Height: 165.1 cm (65\")                Physical Exam  Vitals reviewed.   Constitutional:       General: She is not in acute distress.  HENT:      Nose: Congestion present.      Right Turbinates: Swollen.      Left Turbinates: Swollen.      Mouth/Throat:      Mouth: Mucous membranes are moist.      Pharynx: Oropharynx is clear. No oropharyngeal exudate or posterior oropharyngeal erythema.   Eyes:      Conjunctiva/sclera: Conjunctivae normal.   Neck:      Thyroid: No " thyromegaly.      Vascular: No carotid bruit.   Cardiovascular:      Rate and Rhythm: Normal rate and regular rhythm.      Heart sounds: Normal heart sounds.   Pulmonary:      Effort: Pulmonary effort is normal. No respiratory distress.      Breath sounds: Normal breath sounds. No stridor. No wheezing, rhonchi or rales.   Chest:      Chest wall: No tenderness.   Neurological:      Mental Status: She is alert.   Psychiatric:         Attention and Perception: Attention normal.         Mood and Affect: Mood normal.          Result Review :     The following data was reviewed by: MATEUSZ Good on 10/27/2022:           Assessment and Plan    Diagnoses and all orders for this visit:    1. Nonintractable headache, unspecified chronicity pattern, unspecified headache type (Primary)  Comments:  Checking COVID  Tylenol as needed report any increase symptoms  Orders:  -     COVID-19,[facility] ROUTINE, NP/OP SWAB IN TRANSPORT MEDIA OR ESWAB 72 HR TAT - Swab, Anterior nasal    2. Cough, unspecified type  Comments:  Take medication as directed  Follow-up with any worsening symptoms or no improvements  Orders:  -     COVID-19,[facility] ROUTINE, NP/OP SWAB IN TRANSPORT MEDIA OR ESWAB 72 HR TAT - Swab, Anterior nasal  -     promethazine-dextromethorphan (PROMETHAZINE-DM) 6.25-15 MG/5ML syrup; Take 5 mL by mouth 4 (Four) Times a Day As Needed for Cough.  Dispense: 118 mL; Refill: 0  -     doxycycline (ADOXA) 100 MG tablet; Take 1 tablet by mouth 2 (Two) Times a Day for 10 days.  Dispense: 20 tablet; Refill: 0  -     benzonatate (TESSALON) 200 MG capsule; Take 1 capsule by mouth 3 (Three) Times a Day As Needed for Cough.  Dispense: 42 capsule; Refill: 11    3. Body aches  Comments:  COVID-19 test  Orders:  -     COVID-19,[facility] ROUTINE, NP/OP SWAB IN TRANSPORT MEDIA OR ESWAB 72 HR TAT - Swab, Anterior nasal    4. Upper respiratory tract infection, unspecified type  -     benzonatate (TESSALON) 200 MG capsule; Take 1 capsule by  mouth 3 (Three) Times a Day As Needed for Cough.  Dispense: 42 capsule; Refill: 11    Plan:  -Take all medications as prescribed and until completed.  -Monitor for fever and take Tylenol as needed.  Drink plenty of fluids and get plenty of rest.  -Use cool-mist humidifier as needed.  -Seek immediate medical attention for fever unrelieved by Tylenol, chest pain, shortness of breath, sharp back pain, or any other worsening signs or symptoms.  -Remain in quarantine until 10 days from symptom onset.- if positive results  -The patient verbalized understanding of all instructions given today.       Follow Up   Return if symptoms worsen or fail to improve.  Patient was given instructions and counseling regarding her condition or for health maintenance advice. Please see specific information pulled into the AVS if appropriate.

## 2022-10-28 LAB
LABCORP SARS-COV-2, NAA 2 DAY TAT: NORMAL
SARS-COV-2 RNA RESP QL NAA+PROBE: NOT DETECTED

## 2022-10-30 DIAGNOSIS — E78.2 MIXED HYPERLIPIDEMIA: Chronic | ICD-10-CM

## 2022-11-01 ENCOUNTER — OFFICE VISIT (OUTPATIENT)
Dept: FAMILY MEDICINE CLINIC | Facility: CLINIC | Age: 67
End: 2022-11-01

## 2022-11-01 VITALS
DIASTOLIC BLOOD PRESSURE: 76 MMHG | TEMPERATURE: 97.6 F | RESPIRATION RATE: 16 BRPM | BODY MASS INDEX: 47.82 KG/M2 | HEIGHT: 65 IN | SYSTOLIC BLOOD PRESSURE: 124 MMHG | HEART RATE: 94 BPM | WEIGHT: 287 LBS

## 2022-11-01 DIAGNOSIS — R05.9 COUGH, UNSPECIFIED TYPE: ICD-10-CM

## 2022-11-01 PROCEDURE — 99213 OFFICE O/P EST LOW 20 MIN: CPT | Performed by: FAMILY MEDICINE

## 2022-11-01 RX ORDER — DOXYCYCLINE 100 MG/1
100 TABLET ORAL 2 TIMES DAILY
Qty: 20 TABLET | Refills: 0 | Status: SHIPPED | OUTPATIENT
Start: 2022-11-01 | End: 2022-11-11

## 2022-11-01 RX ORDER — ATORVASTATIN CALCIUM 20 MG/1
TABLET, FILM COATED ORAL
Qty: 30 TABLET | Refills: 0 | Status: SHIPPED | OUTPATIENT
Start: 2022-11-01 | End: 2022-12-12

## 2022-11-04 DIAGNOSIS — E11.9 TYPE 2 DIABETES MELLITUS WITHOUT COMPLICATION, WITHOUT LONG-TERM CURRENT USE OF INSULIN: Chronic | ICD-10-CM

## 2022-11-04 RX ORDER — GLIMEPIRIDE 4 MG/1
TABLET ORAL
Qty: 30 TABLET | Refills: 0 | Status: SHIPPED | OUTPATIENT
Start: 2022-11-04 | End: 2022-12-12

## 2022-11-04 NOTE — TELEPHONE ENCOUNTER
Rx Refill Note  Requested Prescriptions     Pending Prescriptions Disp Refills   • glimepiride (AMARYL) 4 MG tablet [Pharmacy Med Name: GLIMEPIRIDE 4 MG TABLET] 30 tablet 0     Sig: TAKE 1 TABLET BY MOUTH EVERY DAY BEFORE BREAKFAST      Last office visit with prescribing clinician: 11/1/2022      Next office visit with prescribing clinician: Visit date not found

## 2022-11-14 DIAGNOSIS — R05.9 COUGH, UNSPECIFIED TYPE: ICD-10-CM

## 2022-11-15 DIAGNOSIS — R05.9 COUGH, UNSPECIFIED TYPE: ICD-10-CM

## 2022-11-15 RX ORDER — DEXTROMETHORPHAN HYDROBROMIDE AND PROMETHAZINE HYDROCHLORIDE 15; 6.25 MG/5ML; MG/5ML
5 SYRUP ORAL 4 TIMES DAILY PRN
Qty: 118 ML | Refills: 0 | Status: SHIPPED | OUTPATIENT
Start: 2022-11-15

## 2022-11-15 RX ORDER — DEXTROMETHORPHAN HYDROBROMIDE AND PROMETHAZINE HYDROCHLORIDE 15; 6.25 MG/5ML; MG/5ML
SYRUP ORAL
Qty: 118 ML | Refills: 0 | OUTPATIENT
Start: 2022-11-15

## 2022-11-18 DIAGNOSIS — E11.9 TYPE 2 DIABETES MELLITUS WITHOUT COMPLICATION, WITHOUT LONG-TERM CURRENT USE OF INSULIN: ICD-10-CM

## 2022-11-18 RX ORDER — PEN NEEDLE, DIABETIC 32 GX 1/4"
NEEDLE, DISPOSABLE MISCELLANEOUS
Qty: 100 EACH | Refills: 0 | Status: SHIPPED | OUTPATIENT
Start: 2022-11-18 | End: 2023-02-20

## 2022-11-18 NOTE — TELEPHONE ENCOUNTER
Rx Refill Note  Requested Prescriptions     Pending Prescriptions Disp Refills   • BD Pen Needle Micro U/F 32G X 6 MM misc [Pharmacy Med Name: BD UF MICRO PEN NEEDLE [id number]] 100 each 0     Sig: USE DAILY WITH VICTOZA      Last office visit with prescribing clinician: 11/1/2022      Next office visit with prescribing clinician: Visit date not found

## 2022-11-22 DIAGNOSIS — D64.9 ANEMIA, UNSPECIFIED TYPE: Chronic | ICD-10-CM

## 2022-11-22 DIAGNOSIS — E11.9 TYPE 2 DIABETES MELLITUS WITHOUT COMPLICATION, WITHOUT LONG-TERM CURRENT USE OF INSULIN: Chronic | ICD-10-CM

## 2022-11-22 DIAGNOSIS — I10 ESSENTIAL HYPERTENSION: Chronic | ICD-10-CM

## 2022-11-22 RX ORDER — FERROUS SULFATE 325(65) MG
325 TABLET ORAL NIGHTLY
Qty: 30 TABLET | Refills: 0 | Status: SHIPPED | OUTPATIENT
Start: 2022-11-22 | End: 2023-01-04

## 2022-11-22 RX ORDER — AMLODIPINE BESYLATE 10 MG/1
TABLET ORAL
Qty: 30 TABLET | Refills: 0 | Status: SHIPPED | OUTPATIENT
Start: 2022-11-22 | End: 2023-01-04

## 2022-11-22 RX ORDER — VALSARTAN AND HYDROCHLOROTHIAZIDE 160; 12.5 MG/1; MG/1
TABLET, FILM COATED ORAL
Qty: 30 TABLET | Refills: 0 | Status: SHIPPED | OUTPATIENT
Start: 2022-11-22 | End: 2023-01-04

## 2022-11-22 RX ORDER — PIOGLITAZONEHYDROCHLORIDE 30 MG/1
TABLET ORAL
Qty: 30 TABLET | Refills: 0 | Status: SHIPPED | OUTPATIENT
Start: 2022-11-22 | End: 2023-01-04

## 2022-11-22 RX ORDER — EMPAGLIFLOZIN 10 MG/1
TABLET, FILM COATED ORAL
Qty: 30 TABLET | Refills: 0 | Status: SHIPPED | OUTPATIENT
Start: 2022-11-22 | End: 2023-01-04

## 2022-11-23 DIAGNOSIS — G57.03 PIRIFORMIS SYNDROME OF BOTH SIDES: Chronic | ICD-10-CM

## 2022-11-23 RX ORDER — CELECOXIB 200 MG/1
CAPSULE ORAL
Qty: 60 CAPSULE | Refills: 0 | Status: SHIPPED | OUTPATIENT
Start: 2022-11-23 | End: 2023-01-02

## 2022-11-29 DIAGNOSIS — J45.41 MODERATE PERSISTENT ASTHMA WITH ACUTE EXACERBATION: Chronic | ICD-10-CM

## 2022-11-29 RX ORDER — FLUTICASONE FUROATE, UMECLIDINIUM BROMIDE AND VILANTEROL TRIFENATATE 200; 62.5; 25 UG/1; UG/1; UG/1
POWDER RESPIRATORY (INHALATION)
Qty: 60 EACH | Refills: 0 | Status: SHIPPED | OUTPATIENT
Start: 2022-11-29 | End: 2023-03-14 | Stop reason: SDUPTHER

## 2022-12-10 DIAGNOSIS — E11.9 TYPE 2 DIABETES MELLITUS WITHOUT COMPLICATION, WITHOUT LONG-TERM CURRENT USE OF INSULIN: Chronic | ICD-10-CM

## 2022-12-10 DIAGNOSIS — E78.2 MIXED HYPERLIPIDEMIA: Chronic | ICD-10-CM

## 2022-12-12 RX ORDER — ATORVASTATIN CALCIUM 20 MG/1
TABLET, FILM COATED ORAL
Qty: 30 TABLET | Refills: 0 | Status: SHIPPED | OUTPATIENT
Start: 2022-12-12 | End: 2023-03-15 | Stop reason: SDUPTHER

## 2022-12-12 RX ORDER — GLIMEPIRIDE 4 MG/1
TABLET ORAL
Qty: 30 TABLET | Refills: 0 | Status: SHIPPED | OUTPATIENT
Start: 2022-12-12 | End: 2023-03-14 | Stop reason: SDUPTHER

## 2023-01-02 DIAGNOSIS — G57.03 PIRIFORMIS SYNDROME OF BOTH SIDES: Chronic | ICD-10-CM

## 2023-01-02 RX ORDER — CELECOXIB 200 MG/1
CAPSULE ORAL
Qty: 60 CAPSULE | Refills: 0 | Status: SHIPPED | OUTPATIENT
Start: 2023-01-02 | End: 2023-02-13

## 2023-01-02 NOTE — TELEPHONE ENCOUNTER
Rx Refill Note  Requested Prescriptions     Pending Prescriptions Disp Refills   • celecoxib (CeleBREX) 200 MG capsule [Pharmacy Med Name: CELECOXIB 200 MG CAPSULE] 60 capsule 0     Sig: TAKE 1 CAPSULE BY MOUTH TWICE A DAY      Last office visit with prescribing clinician: 3/24/22  Last telemedicine visit with prescribing clinician: Visit date not found   Next office visit with prescribing clinician: Visit date not found

## 2023-01-04 DIAGNOSIS — D64.9 ANEMIA, UNSPECIFIED TYPE: Chronic | ICD-10-CM

## 2023-01-04 DIAGNOSIS — E11.9 TYPE 2 DIABETES MELLITUS WITHOUT COMPLICATION, WITHOUT LONG-TERM CURRENT USE OF INSULIN: Chronic | ICD-10-CM

## 2023-01-04 DIAGNOSIS — I10 ESSENTIAL HYPERTENSION: Chronic | ICD-10-CM

## 2023-01-04 RX ORDER — EMPAGLIFLOZIN 10 MG/1
TABLET, FILM COATED ORAL
Qty: 30 TABLET | Refills: 0 | Status: SHIPPED | OUTPATIENT
Start: 2023-01-04 | End: 2023-03-14 | Stop reason: SDUPTHER

## 2023-01-04 RX ORDER — FERROUS SULFATE 325(65) MG
325 TABLET ORAL NIGHTLY
Qty: 30 TABLET | Refills: 0 | Status: SHIPPED | OUTPATIENT
Start: 2023-01-04 | End: 2023-03-15 | Stop reason: SDUPTHER

## 2023-01-04 RX ORDER — AMLODIPINE BESYLATE 10 MG/1
TABLET ORAL
Qty: 30 TABLET | Refills: 0 | Status: SHIPPED | OUTPATIENT
Start: 2023-01-04 | End: 2023-03-15 | Stop reason: SDUPTHER

## 2023-01-04 RX ORDER — VALSARTAN AND HYDROCHLOROTHIAZIDE 160; 12.5 MG/1; MG/1
TABLET, FILM COATED ORAL
Qty: 30 TABLET | Refills: 0 | Status: SHIPPED | OUTPATIENT
Start: 2023-01-04 | End: 2023-03-14 | Stop reason: SDUPTHER

## 2023-01-04 RX ORDER — PIOGLITAZONEHYDROCHLORIDE 30 MG/1
TABLET ORAL
Qty: 30 TABLET | Refills: 0 | Status: SHIPPED | OUTPATIENT
Start: 2023-01-04 | End: 2023-03-14 | Stop reason: SDUPTHER

## 2023-01-19 ENCOUNTER — TELEPHONE (OUTPATIENT)
Dept: FAMILY MEDICINE CLINIC | Facility: CLINIC | Age: 68
End: 2023-01-19

## 2023-01-19 NOTE — TELEPHONE ENCOUNTER
Caller: Marina Hugo    Relationship: Self    Best call back number:     What is the best time to reach you:    Who are you requesting to speak with (clinical staff, provider,  specific staff member):    Do you know the name of the person who called:     What was the call regarding: PATIENT IS CALLING IN WITH SAME DAY SYMPTOMS AND THERE ARE NO SAME DAY VISITS.  I CALLED THE OFFICE AND WAS PUT ON HOLD.  THE PATIENT WANTS TO BE CALLED BACK.     Do you require a callback: YES

## 2023-02-11 DIAGNOSIS — G57.03 PIRIFORMIS SYNDROME OF BOTH SIDES: Chronic | ICD-10-CM

## 2023-02-13 RX ORDER — CELECOXIB 200 MG/1
CAPSULE ORAL
Qty: 28 CAPSULE | Refills: 0 | Status: SHIPPED | OUTPATIENT
Start: 2023-02-13 | End: 2023-03-09

## 2023-02-20 DIAGNOSIS — E11.9 TYPE 2 DIABETES MELLITUS WITHOUT COMPLICATION, WITHOUT LONG-TERM CURRENT USE OF INSULIN: ICD-10-CM

## 2023-02-20 RX ORDER — PEN NEEDLE, DIABETIC 32 GX 1/4"
NEEDLE, DISPOSABLE MISCELLANEOUS
Qty: 100 EACH | Refills: 0 | Status: SHIPPED | OUTPATIENT
Start: 2023-02-20

## 2023-03-09 DIAGNOSIS — K58.9 IRRITABLE BOWEL SYNDROME WITHOUT DIARRHEA: Chronic | ICD-10-CM

## 2023-03-09 DIAGNOSIS — G57.03 PIRIFORMIS SYNDROME OF BOTH SIDES: Chronic | ICD-10-CM

## 2023-03-09 RX ORDER — DICYCLOMINE HYDROCHLORIDE 10 MG/1
CAPSULE ORAL
Qty: 28 CAPSULE | Refills: 0 | Status: SHIPPED | OUTPATIENT
Start: 2023-03-09 | End: 2023-03-15 | Stop reason: SDUPTHER

## 2023-03-09 RX ORDER — CELECOXIB 200 MG/1
CAPSULE ORAL
Qty: 28 CAPSULE | Refills: 0 | Status: SHIPPED | OUTPATIENT
Start: 2023-03-09 | End: 2023-03-15 | Stop reason: SDUPTHER

## 2023-03-14 NOTE — PROGRESS NOTES
Subjective   Marina Hugo is a 67 y.o. female.     History of Present Illness     Chief Complaint:   Chief Complaint   Patient presents with   • Diabetes   • Hypertension   • Hyperlipidemia   • Asthma       Marina Hugo 67 y.o. female who presents today for Medical Management of the below listed issues. She  has a problem list of   Patient Active Problem List   Diagnosis   • Hypertension   • Asthma   • Diabetes mellitus (HCC)   • Hyperlipidemia   • Irritable bowel syndrome   • Osteoarthritis   • Chronic cough   • Polyp of colon   .  Since the last visit, She has overall felt well.  she has been compliant with   Current Outpatient Medications:   •  amLODIPine (NORVASC) 10 MG tablet, Take 1 tablet by mouth every night at bedtime., Disp: 90 tablet, Rfl: 1  •  atorvastatin (LIPITOR) 20 MG tablet, Take 1 tablet by mouth Daily., Disp: 90 tablet, Rfl: 1  •  celecoxib (CeleBREX) 200 MG capsule, Take 1 capsule by mouth 2 (Two) Times a Day., Disp: 180 capsule, Rfl: 1  •  dicyclomine (BENTYL) 10 MG capsule, Take 1 capsule by mouth 2 (Two) Times a Day., Disp: 180 capsule, Rfl: 1  •  empagliflozin (Jardiance) 10 MG tablet tablet, Take 1 tablet by mouth every night at bedtime., Disp: 90 tablet, Rfl: 1  •  ferrous sulfate 325 (65 FE) MG tablet, Take 1 tablet by mouth Every Night. With food, Disp: 90 tablet, Rfl: 1  •  Fluticasone-Umeclidin-Vilant (Trelegy Ellipta) 200-62.5-25 MCG/ACT aerosol powder , Take 1 puff by mouth Daily., Disp: 90 each, Rfl: 1  •  glimepiride (AMARYL) 4 MG tablet, Take 1 tablet by mouth Every Morning Before Breakfast., Disp: 90 tablet, Rfl: 1  •  Insulin Glargine, 1 Unit Dial, (Toujeo SoloStar) 300 UNIT/ML solution pen-injector injection, Inject 20 Units under the skin into the appropriate area as directed Daily., Disp: 9 mL, Rfl: 1  •  metFORMIN ER (GLUCOPHAGE-XR) 500 MG 24 hr tablet, Take 4 tablets by mouth Every Night., Disp: 360 tablet, Rfl: 1  •  pioglitazone (ACTOS) 30 MG tablet, Take 1 tablet by  "mouth every night at bedtime., Disp: 90 tablet, Rfl: 1  •  valsartan-hydrochlorothiazide (DIOVAN-HCT) 160-12.5 MG per tablet, Take 1 tablet by mouth Daily., Disp: 90 tablet, Rfl: 1  •  Acetaminophen (TYLENOL ARTHRITIS PAIN PO), Take  by mouth 4 (Four) Times a Day As Needed., Disp: , Rfl:   •  albuterol sulfate HFA (ProAir HFA) 108 (90 Base) MCG/ACT inhaler, Inhale 2 puffs Every 6 (Six) Hours As Needed for Wheezing., Disp: 8 g, Rfl: 11  •  BD Pen Needle Micro U/F 32G X 6 MM misc, USE DAILY WITH VICTOZA, Disp: 100 each, Rfl: 0  •  cetirizine (zyrTEC) 10 MG tablet, Take 10 mg by mouth Every Night., Disp: , Rfl:   •  nystatin-triamcinolone (MYCOLOG) 496274-7.1 UNIT/GM-% ointment, Apply 1 application topically to the appropriate area as directed 2 (Two) Times a Day., Disp: 30 g, Rfl: 5  •  promethazine-dextromethorphan (PROMETHAZINE-DM) 6.25-15 MG/5ML syrup, Take 5 mL by mouth 4 (Four) Times a Day As Needed for Cough., Disp: 118 mL, Rfl: 0.  She denies medication side effects.    All of the other chronic condition(s) listed above are stable w/o issues.    /82   Pulse 88   Temp 98.5 °F (36.9 °C) (Oral)   Resp 16   Ht 162.6 cm (64\")   Wt 122 kg (270 lb)   BMI 46.35 kg/m²     Results for orders placed or performed in visit on 10/27/22   COVID-19,[facility] ROUTINE, NP/OP SWAB IN TRANSPORT MEDIA OR ESWAB 72 HR TAT - Swab, Anterior nasal    Specimen: Anterior nasal; Swab    Swab  Previously teste   Result Value Ref Range    SARS-CoV-2, [facility] Not Detected Not Detected   SARS-CoV-2, [facility] 2 DAY TAT - ,    Swab  Previously teste   Result Value Ref Range    [facility] SARS-COV-2, [facility] 2 DAY TAT Performed              The following portions of the patient's history were reviewed and updated as appropriate: allergies, current medications, past family history, past medical history, past social history, past surgical history, and problem list.    Review of Systems   Constitutional: Negative for activity change, chills and fever. "   Respiratory: Negative for cough.    Cardiovascular: Negative for chest pain.   Psychiatric/Behavioral: Negative for dysphoric mood.       Objective   Physical Exam  Constitutional:       General: She is not in acute distress.     Appearance: She is well-developed.   Cardiovascular:      Rate and Rhythm: Normal rate and regular rhythm.   Pulmonary:      Effort: Pulmonary effort is normal.      Breath sounds: Normal breath sounds.   Neurological:      Mental Status: She is alert and oriented to person, place, and time.   Psychiatric:         Behavior: Behavior normal.         Thought Content: Thought content normal.     Labs completed this AM and not back yet.        Diagnoses and all orders for this visit:    1. Type 2 diabetes mellitus without complication, without long-term current use of insulin (HCC) (Primary)  -     glimepiride (AMARYL) 4 MG tablet; Take 1 tablet by mouth Every Morning Before Breakfast.  Dispense: 90 tablet; Refill: 1  -     metFORMIN ER (GLUCOPHAGE-XR) 500 MG 24 hr tablet; Take 4 tablets by mouth Every Night.  Dispense: 360 tablet; Refill: 1  -     empagliflozin (Jardiance) 10 MG tablet tablet; Take 1 tablet by mouth every night at bedtime.  Dispense: 90 tablet; Refill: 1  -     pioglitazone (ACTOS) 30 MG tablet; Take 1 tablet by mouth every night at bedtime.  Dispense: 90 tablet; Refill: 1  -     Insulin Glargine, 1 Unit Dial, (Toujeo SoloStar) 300 UNIT/ML solution pen-injector injection; Inject 20 Units under the skin into the appropriate area as directed Daily.  Dispense: 9 mL; Refill: 1    2. Moderate persistent asthma with acute exacerbation  -     Fluticasone-Umeclidin-Vilant (Trelegy Ellipta) 200-62.5-25 MCG/ACT aerosol powder ; Take 1 puff by mouth Daily.  Dispense: 90 each; Refill: 1    3. Essential hypertension  -     valsartan-hydrochlorothiazide (DIOVAN-HCT) 160-12.5 MG per tablet; Take 1 tablet by mouth Daily.  Dispense: 90 tablet; Refill: 1  -     amLODIPine (NORVASC) 10 MG  tablet; Take 1 tablet by mouth every night at bedtime.  Dispense: 90 tablet; Refill: 1    4. Mixed hyperlipidemia  -     atorvastatin (LIPITOR) 20 MG tablet; Take 1 tablet by mouth Daily.  Dispense: 90 tablet; Refill: 1    5. Piriformis syndrome of both sides  -     celecoxib (CeleBREX) 200 MG capsule; Take 1 capsule by mouth 2 (Two) Times a Day.  Dispense: 180 capsule; Refill: 1    6. Irritable bowel syndrome without diarrhea  -     dicyclomine (BENTYL) 10 MG capsule; Take 1 capsule by mouth 2 (Two) Times a Day.  Dispense: 180 capsule; Refill: 1    7. Anemia, unspecified type  -     ferrous sulfate 325 (65 FE) MG tablet; Take 1 tablet by mouth Every Night. With food  Dispense: 90 tablet; Refill: 1    8. Rash  -     nystatin-triamcinolone (MYCOLOG) 154385-0.1 UNIT/GM-% ointment; Apply 1 application topically to the appropriate area as directed 2 (Two) Times a Day.  Dispense: 30 g; Refill: 5

## 2023-03-15 ENCOUNTER — OFFICE VISIT (OUTPATIENT)
Dept: FAMILY MEDICINE CLINIC | Facility: CLINIC | Age: 68
End: 2023-03-15
Payer: COMMERCIAL

## 2023-03-15 VITALS
RESPIRATION RATE: 16 BRPM | DIASTOLIC BLOOD PRESSURE: 82 MMHG | HEIGHT: 64 IN | SYSTOLIC BLOOD PRESSURE: 128 MMHG | BODY MASS INDEX: 46.1 KG/M2 | WEIGHT: 270 LBS | HEART RATE: 88 BPM | TEMPERATURE: 98.5 F

## 2023-03-15 DIAGNOSIS — K58.9 IRRITABLE BOWEL SYNDROME WITHOUT DIARRHEA: Chronic | ICD-10-CM

## 2023-03-15 DIAGNOSIS — E11.9 TYPE 2 DIABETES MELLITUS WITHOUT COMPLICATION, WITHOUT LONG-TERM CURRENT USE OF INSULIN: Primary | Chronic | ICD-10-CM

## 2023-03-15 DIAGNOSIS — D64.9 ANEMIA, UNSPECIFIED TYPE: Chronic | ICD-10-CM

## 2023-03-15 DIAGNOSIS — J45.41 MODERATE PERSISTENT ASTHMA WITH ACUTE EXACERBATION: Chronic | ICD-10-CM

## 2023-03-15 DIAGNOSIS — R21 RASH: ICD-10-CM

## 2023-03-15 DIAGNOSIS — E78.2 MIXED HYPERLIPIDEMIA: Chronic | ICD-10-CM

## 2023-03-15 DIAGNOSIS — G57.03 PIRIFORMIS SYNDROME OF BOTH SIDES: Chronic | ICD-10-CM

## 2023-03-15 DIAGNOSIS — I10 ESSENTIAL HYPERTENSION: Chronic | ICD-10-CM

## 2023-03-15 PROCEDURE — 99214 OFFICE O/P EST MOD 30 MIN: CPT | Performed by: FAMILY MEDICINE

## 2023-03-15 RX ORDER — INSULIN GLARGINE 300 U/ML
20 INJECTION, SOLUTION SUBCUTANEOUS DAILY
Qty: 9 ML | Refills: 1 | Status: SHIPPED | OUTPATIENT
Start: 2023-03-15 | End: 2023-03-16 | Stop reason: SDUPTHER

## 2023-03-15 RX ORDER — CELECOXIB 200 MG/1
200 CAPSULE ORAL 2 TIMES DAILY
Qty: 180 CAPSULE | Refills: 1 | Status: SHIPPED | OUTPATIENT
Start: 2023-03-15

## 2023-03-15 RX ORDER — FERROUS SULFATE 325(65) MG
325 TABLET ORAL NIGHTLY
Qty: 90 TABLET | Refills: 1 | Status: SHIPPED | OUTPATIENT
Start: 2023-03-15

## 2023-03-15 RX ORDER — METFORMIN HYDROCHLORIDE 500 MG/1
2000 TABLET, EXTENDED RELEASE ORAL NIGHTLY
Qty: 360 TABLET | Refills: 1 | Status: SHIPPED | OUTPATIENT
Start: 2023-03-15

## 2023-03-15 RX ORDER — PIOGLITAZONEHYDROCHLORIDE 30 MG/1
30 TABLET ORAL
Qty: 90 TABLET | Refills: 1 | Status: SHIPPED | OUTPATIENT
Start: 2023-03-15

## 2023-03-15 RX ORDER — AMLODIPINE BESYLATE 10 MG/1
10 TABLET ORAL
Qty: 90 TABLET | Refills: 1 | Status: SHIPPED | OUTPATIENT
Start: 2023-03-15

## 2023-03-15 RX ORDER — GLIMEPIRIDE 4 MG/1
4 TABLET ORAL
Qty: 90 TABLET | Refills: 1 | Status: SHIPPED | OUTPATIENT
Start: 2023-03-15

## 2023-03-15 RX ORDER — ATORVASTATIN CALCIUM 20 MG/1
20 TABLET, FILM COATED ORAL DAILY
Qty: 90 TABLET | Refills: 1 | Status: SHIPPED | OUTPATIENT
Start: 2023-03-15

## 2023-03-15 RX ORDER — NYSTATIN AND TRIAMCINOLONE ACETONIDE 100000; 1 [USP'U]/G; MG/G
1 OINTMENT TOPICAL 2 TIMES DAILY
Qty: 30 G | Refills: 5 | Status: SHIPPED | OUTPATIENT
Start: 2023-03-15

## 2023-03-15 RX ORDER — VALSARTAN AND HYDROCHLOROTHIAZIDE 160; 12.5 MG/1; MG/1
1 TABLET, FILM COATED ORAL DAILY
Qty: 90 TABLET | Refills: 1 | Status: SHIPPED | OUTPATIENT
Start: 2023-03-15

## 2023-03-15 RX ORDER — DICYCLOMINE HYDROCHLORIDE 10 MG/1
10 CAPSULE ORAL 2 TIMES DAILY
Qty: 180 CAPSULE | Refills: 1 | Status: SHIPPED | OUTPATIENT
Start: 2023-03-15

## 2023-03-15 RX ORDER — FLUTICASONE FUROATE, UMECLIDINIUM BROMIDE AND VILANTEROL TRIFENATATE 200; 62.5; 25 UG/1; UG/1; UG/1
1 POWDER RESPIRATORY (INHALATION) DAILY
Qty: 90 EACH | Refills: 1 | Status: SHIPPED | OUTPATIENT
Start: 2023-03-15

## 2023-03-16 DIAGNOSIS — E11.9 TYPE 2 DIABETES MELLITUS WITHOUT COMPLICATION, WITHOUT LONG-TERM CURRENT USE OF INSULIN: Chronic | ICD-10-CM

## 2023-03-16 RX ORDER — INSULIN GLARGINE 300 U/ML
30 INJECTION, SOLUTION SUBCUTANEOUS DAILY
Qty: 10 ML | Refills: 1 | Status: SHIPPED | OUTPATIENT
Start: 2023-03-16

## 2023-06-10 NOTE — PROGRESS NOTES
Chief Complaint: No chief complaint on file.      Marina Hugo 67 y.o. female who presents today for Medical Management of the below listed issues. She  has a problem list of   Patient Active Problem List   Diagnosis   • Hypertension   • Asthma   • Diabetes mellitus   • Hyperlipidemia   • Irritable bowel syndrome   • Osteoarthritis   • Chronic cough   • Polyp of colon   .  Since the last visit, She has overall felt well.  she has been compliant with   Current Outpatient Medications:   •  Acetaminophen (TYLENOL ARTHRITIS PAIN PO), Take  by mouth 4 (Four) Times a Day As Needed., Disp: , Rfl:   •  albuterol sulfate HFA (ProAir HFA) 108 (90 Base) MCG/ACT inhaler, Inhale 2 puffs Every 6 (Six) Hours As Needed for Wheezing., Disp: 8 g, Rfl: 11  •  amLODIPine (NORVASC) 10 MG tablet, Take 1 tablet by mouth every night at bedtime., Disp: 90 tablet, Rfl: 1  •  atorvastatin (LIPITOR) 20 MG tablet, Take 1 tablet by mouth Daily., Disp: 90 tablet, Rfl: 1  •  BD Pen Needle Micro U/F 32G X 6 MM misc, USE DAILY WITH VICTOZA, Disp: 100 each, Rfl: 0  •  celecoxib (CeleBREX) 200 MG capsule, Take 1 capsule by mouth 2 (Two) Times a Day., Disp: 180 capsule, Rfl: 1  •  cetirizine (zyrTEC) 10 MG tablet, Take 10 mg by mouth Every Night., Disp: , Rfl:   •  dicyclomine (BENTYL) 10 MG capsule, Take 1 capsule by mouth 2 (Two) Times a Day., Disp: 180 capsule, Rfl: 1  •  empagliflozin (Jardiance) 10 MG tablet tablet, Take 1 tablet by mouth every night at bedtime., Disp: 90 tablet, Rfl: 1  •  ferrous sulfate 325 (65 FE) MG tablet, Take 1 tablet by mouth Every Night. With food, Disp: 90 tablet, Rfl: 1  •  Fluticasone-Umeclidin-Vilant (Trelegy Ellipta) 200-62.5-25 MCG/ACT aerosol powder , Take 1 puff by mouth Daily., Disp: 90 each, Rfl: 1  •  glimepiride (AMARYL) 4 MG tablet, Take 1 tablet by mouth Every Morning Before Breakfast., Disp: 90 tablet, Rfl: 1  •  Insulin Glargine, 1 Unit Dial, (Toujeo SoloStar) 300 UNIT/ML solution pen-injector  injection, Inject 30 Units under the skin into the appropriate area as directed Daily., Disp: 10 mL, Rfl: 1  •  metFORMIN ER (GLUCOPHAGE-XR) 500 MG 24 hr tablet, Take 4 tablets by mouth Every Night., Disp: 360 tablet, Rfl: 1  •  nystatin-triamcinolone (MYCOLOG) 974245-5.1 UNIT/GM-% ointment, Apply 1 application topically to the appropriate area as directed 2 (Two) Times a Day., Disp: 30 g, Rfl: 5  •  pioglitazone (ACTOS) 30 MG tablet, Take 1 tablet by mouth every night at bedtime., Disp: 90 tablet, Rfl: 1  •  promethazine-dextromethorphan (PROMETHAZINE-DM) 6.25-15 MG/5ML syrup, Take 5 mL by mouth 4 (Four) Times a Day As Needed for Cough., Disp: 118 mL, Rfl: 0  •  valsartan-hydrochlorothiazide (DIOVAN-HCT) 160-12.5 MG per tablet, Take 1 tablet by mouth Daily., Disp: 90 tablet, Rfl: 1.  She denies medication side effects.    All of the other chronic condition(s) listed above are stable w/o issues.    There were no vitals taken for this visit.    Results for orders placed or performed in visit on 10/27/22   COVID-19,[facility] ROUTINE, NP/OP SWAB IN TRANSPORT MEDIA OR ESWAB 72 HR TAT - Swab, Anterior nasal    Specimen: Anterior nasal; Swab    Swab  Previously teste   Result Value Ref Range    SARS-CoV-2, [facility] Not Detected Not Detected   SARS-CoV-2, [facility] 2 DAY TAT - ,    Swab  Previously teste   Result Value Ref Range    [facility] SARS-COV-2, [facility] 2 DAY TAT Performed              The following portions of the patient's history were reviewed and updated as appropriate: allergies, current medications, past family history, past medical history, past social history, past surgical history, and problem list.    Review of Systems   Constitutional: Negative for activity change, chills and fever.   Respiratory: Positive for cough (chronic, off/on (has asthma)).    Cardiovascular: Negative for chest pain.   Psychiatric/Behavioral: Negative for dysphoric mood.       Objective     {Class 3 Severe Obesity (BMI >=40). (Optional):52359}        Physical Exam  Constitutional:       General: She is not in acute distress.     Appearance: She is well-developed.   Cardiovascular:      Rate and Rhythm: Normal rate and regular rhythm.   Pulmonary:      Effort: Pulmonary effort is normal.      Breath sounds: Normal breath sounds.   Neurological:      Mental Status: She is alert and oriented to person, place, and time.   Psychiatric:         Behavior: Behavior normal.         Thought Content: Thought content normal.             There are no diagnoses linked to this encounter.

## 2023-06-12 ENCOUNTER — OFFICE VISIT (OUTPATIENT)
Dept: FAMILY MEDICINE CLINIC | Facility: CLINIC | Age: 68
End: 2023-06-12
Payer: COMMERCIAL

## 2023-06-12 VITALS
SYSTOLIC BLOOD PRESSURE: 130 MMHG | WEIGHT: 286 LBS | TEMPERATURE: 97.6 F | HEIGHT: 64 IN | DIASTOLIC BLOOD PRESSURE: 79 MMHG | OXYGEN SATURATION: 95 % | BODY MASS INDEX: 48.83 KG/M2 | HEART RATE: 84 BPM | RESPIRATION RATE: 16 BRPM

## 2023-06-12 DIAGNOSIS — J45.41 MODERATE PERSISTENT ASTHMA WITH ACUTE EXACERBATION: Chronic | ICD-10-CM

## 2023-06-12 DIAGNOSIS — J06.9 ACUTE URI: Primary | ICD-10-CM

## 2023-06-12 PROCEDURE — 99214 OFFICE O/P EST MOD 30 MIN: CPT | Performed by: FAMILY MEDICINE

## 2023-06-12 RX ORDER — METHYLPREDNISOLONE 4 MG/1
TABLET ORAL
Qty: 21 TABLET | Refills: 0 | Status: SHIPPED | OUTPATIENT
Start: 2023-06-12

## 2023-06-12 RX ORDER — DEXTROMETHORPHAN HYDROBROMIDE AND PROMETHAZINE HYDROCHLORIDE 15; 6.25 MG/5ML; MG/5ML
5 SYRUP ORAL 4 TIMES DAILY PRN
Qty: 118 ML | Refills: 0 | Status: SHIPPED | OUTPATIENT
Start: 2023-06-12

## 2023-06-12 RX ORDER — FLUTICASONE FUROATE, UMECLIDINIUM BROMIDE AND VILANTEROL TRIFENATATE 200; 62.5; 25 UG/1; UG/1; UG/1
1 POWDER RESPIRATORY (INHALATION) DAILY
Qty: 90 EACH | Refills: 1 | Status: SHIPPED | OUTPATIENT
Start: 2023-06-12 | End: 2023-06-13

## 2023-06-12 RX ORDER — DOXYCYCLINE HYCLATE 100 MG
100 TABLET ORAL 2 TIMES DAILY
Qty: 20 TABLET | Refills: 0 | Status: SHIPPED | OUTPATIENT
Start: 2023-06-12

## 2023-06-12 NOTE — PROGRESS NOTES
"Subjective   Marina Hugo is a 67 y.o. female.     CC: URI-Sx    Pt comes in today reporting 2 week h/o increased cough/congestion, although currently no wheezing. No f/c.     The following portions of the patient's history were reviewed and updated as appropriate: allergies, current medications, past family history, past medical history, past social history, past surgical history, and problem list.    Review of Systems   Constitutional:  Negative for activity change, chills and fever.   HENT:  Positive for congestion.    Respiratory:  Positive for cough.    Cardiovascular:  Negative for chest pain.   Psychiatric/Behavioral:  Negative for dysphoric mood.      /79   Pulse 84   Temp 97.6 °F (36.4 °C) (Oral)   Resp 16   Ht 162.6 cm (64\")   Wt 130 kg (286 lb)   SpO2 95%   BMI 49.09 kg/m²     Objective   Physical Exam  Constitutional:       General: She is not in acute distress.     Appearance: She is well-developed.   HENT:      Right Ear: Tympanic membrane and ear canal normal.      Left Ear: Tympanic membrane and ear canal normal.   Cardiovascular:      Rate and Rhythm: Normal rate and regular rhythm.   Pulmonary:      Effort: Pulmonary effort is normal.      Breath sounds: Wheezing present.   Neurological:      Mental Status: She is alert and oriented to person, place, and time.   Psychiatric:         Behavior: Behavior normal.         Thought Content: Thought content normal.       Assessment & Plan   Diagnoses and all orders for this visit:    1. Acute URI (Primary)  -     methylPREDNISolone (Medrol) 4 MG dose pack; follow package directions  Dispense: 21 tablet; Refill: 0  -     doxycycline (VIBRAMYICN) 100 MG tablet; Take 1 tablet by mouth 2 (Two) Times a Day.  Dispense: 20 tablet; Refill: 0  -     promethazine-dextromethorphan (PROMETHAZINE-DM) 6.25-15 MG/5ML syrup; Take 5 mL by mouth 4 (Four) Times a Day As Needed for Cough.  Dispense: 118 mL; Refill: 0    2. Moderate persistent asthma with acute " exacerbation  -     Fluticasone-Umeclidin-Vilant (Trelegy Ellipta) 200-62.5-25 MCG/ACT aerosol powder ; Take 1 puff by mouth Daily.  Dispense: 90 each; Refill: 1

## 2023-06-13 DIAGNOSIS — J45.41 MODERATE PERSISTENT ASTHMA WITH ACUTE EXACERBATION: Chronic | ICD-10-CM

## 2023-06-13 RX ORDER — FLUTICASONE FUROATE, UMECLIDINIUM BROMIDE AND VILANTEROL TRIFENATATE 200; 62.5; 25 UG/1; UG/1; UG/1
POWDER RESPIRATORY (INHALATION)
Qty: 60 EACH | Refills: 1 | Status: SHIPPED | OUTPATIENT
Start: 2023-06-13

## 2023-07-11 ENCOUNTER — TELEPHONE (OUTPATIENT)
Dept: ORTHOPEDIC SURGERY | Facility: CLINIC | Age: 68
End: 2023-07-11

## 2023-07-11 NOTE — TELEPHONE ENCOUNTER
Caller: Marina Hugo    Relationship to patient: Self    Best call back number: [phone]    Patient is needing:[facility] REQUESTING PT ORDER TO BE FAXED [phone] ASAP.

## 2023-07-12 ENCOUNTER — TELEPHONE (OUTPATIENT)
Dept: ORTHOPEDIC SURGERY | Facility: CLINIC | Age: 68
End: 2023-07-12

## 2023-07-12 NOTE — TELEPHONE ENCOUNTER
Caller: MORENA PEREZ    Relationship to patient: SELF    Best call back number: [phone]    Chief complaint: BILATERAL KNEE PAIN    Type of visit: CORTISONE INJECTION    Requested date: 9/29/23    If rescheduling, when is the original appointment: 9/29/23     Additional notes: WANTS SOMETHING LATER IN THE DAY, SAID SHE CAN BE THERE BY 3:15

## 2023-07-25 NOTE — PROGRESS NOTES
Subjective   Marina Hugo is a 67 y.o. female.     CC: Management of Cough    History of Present Illness     Pt returns today after seeing me on 7/5/23 with this note:    Patient returns today after seeing me on 6/12/2023 with this note:     Pt comes in today reporting 2 week h/o increased cough/congestion, although currently no wheezing. No f/c.      1. Acute URI (Primary)  -     methylPREDNISolone (Medrol) 4 MG dose pack; follow package directions  Dispense: 21 tablet; Refill: 0  -     doxycycline (VIBRAMYICN) 100 MG tablet; Take 1 tablet by mouth 2 (Two) Times a Day.  Dispense: 20 tablet; Refill: 0  -     promethazine-dextromethorphan (PROMETHAZINE-DM) 6.25-15 MG/5ML syrup; Take 5 mL by mouth 4 (Four) Times a Day As Needed for Cough.  Dispense: 118 mL; Refill: 0     2. Moderate persistent asthma with acute exacerbation  -     Fluticasone-Umeclidin-Vilant (Trelegy Ellipta) 200-62.5-25 MCG/ACT aerosol powder ; Take 1 puff by mouth Daily.  Dispense: 90 each; Refill:      She called back on 623 needing a refill as she was better but not fully, and this was given.     Today, patient reports about 80+ percent improvement, yet the cough does persist to some degree.  This is not uncommon for her with her prior issues of asthma and sometimes does require a stronger antibiotic prior.    1. Bronchitis (Primary)  -     levoFLOXacin (Levaquin) 500 MG tablet; Take 1 tablet by mouth Daily for 5 days.  Dispense: 5 tablet; Refill: 0    Today, pt reports got some better on the 5-day course of Levaquin, but then symptoms came right back.  She is using all of her asthma medication as prescribed.            The following portions of the patient's history were reviewed and updated as appropriate: allergies, current medications, past family history, past medical history, past social history, past surgical history, and problem list.    Review of Systems   Constitutional:  Negative for activity change, chills and fever.   Respiratory:   "Positive for cough.    Cardiovascular:  Negative for chest pain.   Psychiatric/Behavioral:  Negative for dysphoric mood.      /73   Pulse 86   Temp 97.9 °F (36.6 °C) (Oral)   Resp 16   Ht 160 cm (63\")   Wt 129 kg (284 lb)   SpO2 96%   BMI 50.31 kg/m²     Objective   Physical Exam  Constitutional:       General: She is not in acute distress.     Appearance: She is well-developed.   Cardiovascular:      Rate and Rhythm: Normal rate and regular rhythm.   Pulmonary:      Effort: Pulmonary effort is normal.      Breath sounds: Normal breath sounds.   Neurological:      Mental Status: She is alert and oriented to person, place, and time.   Psychiatric:         Behavior: Behavior normal.         Thought Content: Thought content normal.   CXR: ordered due to chronic cough in an Asthmatic, no comparison, read by me: nothing acute    Assessment & Plan   Diagnoses and all orders for this visit:    1. Chronic cough (Primary)  -     XR Chest 2 View  -     Ambulatory Referral to Allergy  -     levoFLOXacin (Levaquin) 500 MG tablet; Take 1 tablet by mouth Daily for 10 days.  Dispense: 10 tablet; Refill: 0    2. Moderate persistent asthma with acute exacerbation  -     Ambulatory Referral to Allergy                 "

## 2023-07-26 ENCOUNTER — OFFICE VISIT (OUTPATIENT)
Dept: FAMILY MEDICINE CLINIC | Facility: CLINIC | Age: 68
End: 2023-07-26
Payer: COMMERCIAL

## 2023-07-26 VITALS
BODY MASS INDEX: 50.32 KG/M2 | OXYGEN SATURATION: 96 % | HEART RATE: 86 BPM | RESPIRATION RATE: 16 BRPM | DIASTOLIC BLOOD PRESSURE: 73 MMHG | HEIGHT: 63 IN | TEMPERATURE: 97.9 F | SYSTOLIC BLOOD PRESSURE: 120 MMHG | WEIGHT: 284 LBS

## 2023-07-26 DIAGNOSIS — J45.41 MODERATE PERSISTENT ASTHMA WITH ACUTE EXACERBATION: ICD-10-CM

## 2023-07-26 DIAGNOSIS — R05.3 CHRONIC COUGH: Primary | ICD-10-CM

## 2023-07-26 PROCEDURE — 71046 X-RAY EXAM CHEST 2 VIEWS: CPT | Performed by: FAMILY MEDICINE

## 2023-07-26 PROCEDURE — 99214 OFFICE O/P EST MOD 30 MIN: CPT | Performed by: FAMILY MEDICINE

## 2023-07-26 RX ORDER — LEVOFLOXACIN 500 MG/1
500 TABLET, FILM COATED ORAL DAILY
Qty: 10 TABLET | Refills: 0 | Status: SHIPPED | OUTPATIENT
Start: 2023-07-26 | End: 2023-08-05

## 2023-07-27 ENCOUNTER — TELEPHONE (OUTPATIENT)
Dept: FAMILY MEDICINE CLINIC | Facility: CLINIC | Age: 68
End: 2023-07-27
Payer: COMMERCIAL

## 2023-07-27 DIAGNOSIS — J18.9 PNEUMONIA DUE TO INFECTIOUS ORGANISM, UNSPECIFIED LATERALITY, UNSPECIFIED PART OF LUNG: ICD-10-CM

## 2023-07-27 DIAGNOSIS — J45.41 MODERATE PERSISTENT ASTHMA WITH ACUTE EXACERBATION: Primary | ICD-10-CM

## 2023-07-27 NOTE — TELEPHONE ENCOUNTER
PER DR POLLARD. RADIOLOGY CALLED REGARDING CXR REPORT - IT DID SHOW A SMALL PNEUMONIA . PT NEEDS TO COMPLETE ANTIBIOTIC AND NEEDS A FOLLOW UP CXR 4 WEEKS ( ORDERED) PT ALSO TOLD TO KEEP DR MCKEON'S APPOINTMENT  PT TOLD AND UNDERSTANDS DIRECTIONS

## 2023-08-01 DIAGNOSIS — E78.2 MIXED HYPERLIPIDEMIA: Chronic | ICD-10-CM

## 2023-08-01 DIAGNOSIS — I10 ESSENTIAL HYPERTENSION: Chronic | ICD-10-CM

## 2023-08-01 DIAGNOSIS — G57.03 PIRIFORMIS SYNDROME OF BOTH SIDES: Chronic | ICD-10-CM

## 2023-08-01 DIAGNOSIS — E11.9 TYPE 2 DIABETES MELLITUS WITHOUT COMPLICATION, WITHOUT LONG-TERM CURRENT USE OF INSULIN: Chronic | ICD-10-CM

## 2023-08-01 RX ORDER — CELECOXIB 200 MG/1
CAPSULE ORAL
Qty: 180 CAPSULE | Refills: 1 | OUTPATIENT
Start: 2023-08-01

## 2023-08-01 RX ORDER — ATORVASTATIN CALCIUM 20 MG/1
TABLET, FILM COATED ORAL
Qty: 90 TABLET | Refills: 1 | OUTPATIENT
Start: 2023-08-01

## 2023-08-01 RX ORDER — AMLODIPINE BESYLATE 10 MG/1
TABLET ORAL
Qty: 90 TABLET | Refills: 1 | OUTPATIENT
Start: 2023-08-01

## 2023-08-01 RX ORDER — PIOGLITAZONEHYDROCHLORIDE 30 MG/1
TABLET ORAL
Qty: 90 TABLET | Refills: 1 | OUTPATIENT
Start: 2023-08-01

## 2023-08-02 ENCOUNTER — TELEPHONE (OUTPATIENT)
Dept: FAMILY MEDICINE CLINIC | Facility: CLINIC | Age: 68
End: 2023-08-02
Payer: COMMERCIAL

## 2023-08-02 ENCOUNTER — TELEPHONE (OUTPATIENT)
Dept: FAMILY MEDICINE CLINIC | Facility: CLINIC | Age: 68
End: 2023-08-02

## 2023-08-02 DIAGNOSIS — E66.01 MORBID (SEVERE) OBESITY DUE TO EXCESS CALORIES: ICD-10-CM

## 2023-08-02 DIAGNOSIS — E11.9 TYPE 2 DIABETES MELLITUS WITHOUT COMPLICATION, WITHOUT LONG-TERM CURRENT USE OF INSULIN: Chronic | ICD-10-CM

## 2023-08-02 DIAGNOSIS — E11.65 TYPE 2 DIABETES MELLITUS WITH HYPERGLYCEMIA, WITHOUT LONG-TERM CURRENT USE OF INSULIN: ICD-10-CM

## 2023-08-02 RX ORDER — INSULIN GLARGINE 300 U/ML
40 INJECTION, SOLUTION SUBCUTANEOUS DAILY
Qty: 15 ML | Refills: 1 | Status: SHIPPED | OUTPATIENT
Start: 2023-08-02

## 2023-08-02 RX ORDER — INSULIN GLARGINE 300 U/ML
30 INJECTION, SOLUTION SUBCUTANEOUS DAILY
Qty: 10 ML | Refills: 1 | Status: SHIPPED | OUTPATIENT
Start: 2023-08-02 | End: 2023-08-02 | Stop reason: SDUPTHER

## 2023-08-02 NOTE — TELEPHONE ENCOUNTER
Pharmacy Name:  [facility]/pharmacy #[id number] [place], [place] - [address] AT [facility] [phone] [facility] [phone]      Pharmacy representative name: KERRY    Pharmacy representative phone number: [phone]     What medication are you calling in regards to: TOUJEO    What question does the pharmacy have: MONICA STATED THAT PATIENT SAYS DR POLLARD VERBALLY TOLD HER SHE SHOULD GO UP TO 40 UNITS A DAY. PATIENT HAS BEEN TAKING THAT AND NOW SHE HAS RUN OUT. KERRY STATED THEY HAVE RECEIVED A PRESCRIPTION FOR 30 UNITS AND 20 UNITS, BUT HAVE NOT RECEIVED A 40 UNIT PRESCRIPTION. INSURANCE WILL NOT PAY FOR HER TO BE ON 30 UNITS BECAUSE SHE HAS TO USED IT ALL. PLEASE ADVISE.    Who is the provider that prescribed the medication: DR POLLARD

## 2023-08-22 DIAGNOSIS — J06.9 ACUTE URI: ICD-10-CM

## 2023-08-22 RX ORDER — DEXTROMETHORPHAN HYDROBROMIDE AND PROMETHAZINE HYDROCHLORIDE 15; 6.25 MG/5ML; MG/5ML
SYRUP ORAL
Qty: 118 ML | Refills: 0 | OUTPATIENT
Start: 2023-08-22

## 2023-08-24 ENCOUNTER — OFFICE VISIT (OUTPATIENT)
Dept: FAMILY MEDICINE CLINIC | Facility: CLINIC | Age: 68
End: 2023-08-24
Payer: COMMERCIAL

## 2023-08-24 VITALS
HEART RATE: 87 BPM | TEMPERATURE: 97 F | BODY MASS INDEX: 50.32 KG/M2 | OXYGEN SATURATION: 97 % | DIASTOLIC BLOOD PRESSURE: 74 MMHG | WEIGHT: 284 LBS | RESPIRATION RATE: 16 BRPM | SYSTOLIC BLOOD PRESSURE: 122 MMHG | HEIGHT: 63 IN

## 2023-08-24 DIAGNOSIS — R05.9 COUGH, UNSPECIFIED TYPE: Primary | ICD-10-CM

## 2023-08-24 DIAGNOSIS — R53.83 OTHER FATIGUE: ICD-10-CM

## 2023-08-24 DIAGNOSIS — J01.90 ACUTE SINUSITIS, RECURRENCE NOT SPECIFIED, UNSPECIFIED LOCATION: ICD-10-CM

## 2023-08-24 DIAGNOSIS — R52 BODY ACHES: ICD-10-CM

## 2023-08-24 DIAGNOSIS — J45.991 COUGH VARIANT ASTHMA: ICD-10-CM

## 2023-08-24 DIAGNOSIS — R05.3 CHRONIC COUGH: ICD-10-CM

## 2023-08-24 LAB
EXPIRATION DATE: NORMAL
FLUAV AG UPPER RESP QL IA.RAPID: NOT DETECTED
FLUBV AG UPPER RESP QL IA.RAPID: NOT DETECTED
INTERNAL CONTROL: NORMAL
Lab: NORMAL
SARS-COV-2 AG UPPER RESP QL IA.RAPID: NOT DETECTED

## 2023-08-24 PROCEDURE — 87428 SARSCOV & INF VIR A&B AG IA: CPT | Performed by: PHYSICIAN ASSISTANT

## 2023-08-24 PROCEDURE — 99214 OFFICE O/P EST MOD 30 MIN: CPT | Performed by: PHYSICIAN ASSISTANT

## 2023-08-24 RX ORDER — METHYLPREDNISOLONE 4 MG/1
TABLET ORAL
Qty: 21 TABLET | Refills: 0 | Status: SHIPPED | OUTPATIENT
Start: 2023-08-24

## 2023-08-24 RX ORDER — HYDROCODONE POLISTIREX AND CHLORPHENIRAMINE POLISTIREX 10; 8 MG/5ML; MG/5ML
5 SUSPENSION, EXTENDED RELEASE ORAL EVERY 12 HOURS PRN
Qty: 100 ML | Refills: 0 | Status: SHIPPED | OUTPATIENT
Start: 2023-08-24 | End: 2023-09-05

## 2023-08-24 RX ORDER — ALBUTEROL SULFATE 2.5 MG/3ML
2.5 SOLUTION RESPIRATORY (INHALATION) ONCE
Status: COMPLETED | OUTPATIENT
Start: 2023-08-24 | End: 2023-08-24

## 2023-08-24 RX ORDER — CEFDINIR 300 MG/1
CAPSULE ORAL
Qty: 20 CAPSULE | Refills: 0 | Status: SHIPPED | OUTPATIENT
Start: 2023-08-24

## 2023-08-24 RX ORDER — ONDANSETRON 4 MG/1
4 TABLET, ORALLY DISINTEGRATING ORAL EVERY 8 HOURS PRN
Qty: 20 TABLET | Refills: 1 | Status: SHIPPED | OUTPATIENT
Start: 2023-08-24

## 2023-08-24 RX ORDER — ALBUTEROL SULFATE 2.5 MG/3ML
2.5 SOLUTION RESPIRATORY (INHALATION) EVERY 4 HOURS PRN
Qty: 90 EACH | Refills: 12 | Status: SHIPPED | OUTPATIENT
Start: 2023-08-24

## 2023-08-24 RX ADMIN — ALBUTEROL SULFATE 2.5 MG: 2.5 SOLUTION RESPIRATORY (INHALATION) at 14:16

## 2023-08-24 NOTE — PROGRESS NOTES
"Subjective   Marina Hugo is a 68 y.o. female.     History of Present Illness   Marina Hugo 68 y.o. female /74   Pulse 87   Temp 97 øF (36.1 øC)   Resp 16   Ht 160 cm (63\")   Wt 129 kg (284 lb)   SpO2 97%   BMI 50.31 kg/mý  who presents today for continued upper respiratory congestion and wheezing and excessive coughing...... onset was 2 months ago as he... Twice for this last visit obtain chest x-ray noting findings below of pneumonia.  Patient did take Levaquin and did not notice much improvement.  She denies having GERD.  She is not taking her diabetes medication or her she has a history of   Patient Active Problem List   Diagnosis    Hypertension    Asthma    Diabetes mellitus    Hyperlipidemia    Irritable bowel syndrome    Osteoarthritis    Chronic cough    Polyp of colon   .    Lab Results   Component Value Date    GLUCOSE 131 (H) 03/15/2023    BUN 22 03/15/2023    CREATININE 0.78 03/15/2023    EGFRRESULT 82 03/15/2023    BCR 28 03/15/2023    K 4.5 03/15/2023    CO2 19 (L) 03/15/2023    CALCIUM 10.4 (H) 03/15/2023    PROTENTOTREF 7.3 07/14/2022    ALBUMIN 4.8 07/14/2022    BILITOT 0.5 07/14/2022    AST 16 07/14/2022    ALT 15 07/14/2022     Lab Results   Component Value Date    HGBA1C 9.7 (H) 03/15/2023   Still having pnd and wheezing-----tightness, cough so intensely that she has been throwing up      She did see Dr Allen twice for this-----7-5 and then 7-26-23---    7-5 Rx ----Medrol, Doxy----Trelegy-----  On f/u ----took Levaquin   CXR 7-26-23 ---subtle hazy opacity right lung base  No pred since 7-5-23 visit      Cough not much response to the Levaquin  X-Ray  Interpretation report in house X-rays that I personally viewed    Relevant Clinical Issues/Diagnoses/Indications: Following up from pneumonia 7/26/2023 patient still coughing        Clinical Findings:  --Normal heart size, same pulmonary arterial enlargement. Same atelectasis in bases.          Comparative Data:  " yes          Date of Previous X-ray: 7/26/2023  Change on current X-ray:  no    The following portions of the patient's history were reviewed and updated as appropriate: allergies, current medications, past family history, past medical history, past social history, past surgical history, and problem list.    Review of Systems   Constitutional:  Positive for activity change, appetite change and fatigue. Negative for diaphoresis and fever.   HENT:  Positive for congestion and postnasal drip. Negative for nosebleeds and trouble swallowing.    Eyes:  Negative for blurred vision and visual disturbance.   Respiratory:  Positive for cough, chest tightness, shortness of breath and wheezing. Negative for choking.    Gastrointestinal:  Positive for nausea and vomiting. Negative for blood in stool.   Allergic/Immunologic: Negative for immunocompromised state.   Neurological:  Positive for headache. Negative for facial asymmetry and speech difficulty.   Psychiatric/Behavioral:  Negative for self-injury and suicidal ideas.      Objective   Physical Exam  Vitals and nursing note reviewed.   Constitutional:       General: She is not in acute distress.     Appearance: She is well-developed. She is obese. She is not toxic-appearing.   HENT:      Head: Normocephalic.      Right Ear: Tympanic membrane, ear canal and external ear normal.      Left Ear: Tympanic membrane, ear canal and external ear normal.      Nose: Congestion present.      Mouth/Throat:      Pharynx: Oropharynx is clear. Posterior oropharyngeal erythema present.   Eyes:      General: No scleral icterus.     Conjunctiva/sclera: Conjunctivae normal.      Pupils: Pupils are equal, round, and reactive to light.   Neck:      Thyroid: No thyromegaly.   Cardiovascular:      Rate and Rhythm: Normal rate and regular rhythm.      Heart sounds: Normal heart sounds. No murmur heard.  Pulmonary:      Effort: Pulmonary effort is normal. No respiratory distress.      Breath sounds:  Wheezing and rhonchi present.      Comments: Lung sounds improved after nebulized albuterol-less wheezing and rhonchi no rales on exam  Musculoskeletal:         General: No deformity. Normal range of motion.      Cervical back: Normal range of motion and neck supple.   Skin:     General: Skin is warm and dry.      Findings: No rash.   Neurological:      General: No focal deficit present.      Mental Status: She is alert and oriented to person, place, and time. Mental status is at baseline.   Psychiatric:         Mood and Affect: Mood normal.         Behavior: Behavior normal.         Thought Content: Thought content normal.         Judgment: Judgment normal.         Assessment & Plan   Diagnoses and all orders for this visit:    1. Cough, unspecified type (Primary)  -     POCT SARS-CoV-2 Antigen DONYA + Flu  -     albuterol (PROVENTIL) nebulizer solution 0.083% 2.5 mg/3mL    2. Other fatigue  -     POCT SARS-CoV-2 Antigen DONYA + Flu    3. Body aches  -     POCT SARS-CoV-2 Antigen DONYA + Flu    4. Chronic cough    5. Cough variant asthma    6. Acute sinusitis, recurrence not specified, unspecified location    Other orders  -     albuterol (PROVENTIL) (2.5 MG/3ML) 0.083% nebulizer solution; Take 2.5 mg by nebulization Every 4 (Four) Hours As Needed for Wheezing.  Dispense: 90 each; Refill: 12  -     cefdinir (OMNICEF) 300 MG capsule; One cap PO BID for infection  Dispense: 20 capsule; Refill: 0  -     methylPREDNISolone (MEDROL) 4 MG dose pack; follow package directions  Dispense: 21 tablet; Refill: 0  -     ondansetron ODT (ZOFRAN-ODT) 4 MG disintegrating tablet; Place 1 tablet on the tongue Every 8 (Eight) Hours As Needed for Nausea or Vomiting.  Dispense: 20 tablet; Refill: 1      Advised 2-week trial of omeprazole--- cough may be GERD induced as well and patient understands to get this over-the-counter  Still see Dr. Heredia  Discussed in person plan of care with Dr. Allen today and sent Tussionex that patient has had  in past for cough  We will send Zofran ODT as needed nausea watch drowsiness patient must take her medications for her diabetes and hypertension  Must restart trilogy can still see Dr. Heredia but must be on her trilogy due to her current status of breathing  Her sinus infection with cefdinir  Sent refills on albuterol Nebules  Albuterol mini neb given here in the office

## 2023-08-25 ENCOUNTER — TELEPHONE (OUTPATIENT)
Dept: FAMILY MEDICINE CLINIC | Facility: CLINIC | Age: 68
End: 2023-08-25

## 2023-08-25 NOTE — TELEPHONE ENCOUNTER
THIS PT  STOP BY STATING THAT PHARMACY  OUT OF ALBUTEROL AND     PLEASE ADVISE      REQUESTING A CALL BACK

## 2023-08-28 DIAGNOSIS — J45.41 ASTHMATIC BRONCHITIS, MODERATE PERSISTENT, WITH ACUTE EXACERBATION: ICD-10-CM

## 2023-08-28 RX ORDER — ALBUTEROL SULFATE 90 UG/1
2 AEROSOL, METERED RESPIRATORY (INHALATION) EVERY 6 HOURS PRN
Qty: 8 G | Refills: 11 | Status: SHIPPED | OUTPATIENT
Start: 2023-08-28

## 2023-08-28 NOTE — TELEPHONE ENCOUNTER
Caller: Stalin Hugo    Relationship: Emergency Contact    Best call back number: [phone]    Requested Prescriptions:   Requested Prescriptions     Pending Prescriptions Disp Refills    albuterol sulfate HFA (ProAir HFA) 108 (90 Base) MCG/ACT inhaler 8 g 11     Sig: Inhale 2 puffs Every 6 (Six) Hours As Needed for Wheezing.        Pharmacy where request should be sent: [facility]/PHARMACY #[id number] - [facility], [place] - [address] AT [facility] [address] [phone] [facility] [phone] FX     Last office visit with prescribing clinician: 7/26/2023   Last telemedicine visit with prescribing clinician: Visit date not found   Next office visit with prescribing clinician: Visit date not found     Additional details provided by patient: THE PATIENT WOULD LIKE TO KNOW IF SHE SHOULD USE HER RESCUE INHALER ALONG WITH HER ALBUTEROL NEBULIZER. PLEASE ADVISE.     Does the patient have less than a 3 day supply:  [x] Yes  [] No    Would you like a call back once the refill request has been completed: [x] Yes [] No    If the office needs to give you a call back, can they leave a voicemail: [x] Yes [] No    Hong Guzman Rep   08/28/23 14:33 EDT

## 2023-08-29 NOTE — TELEPHONE ENCOUNTER
LEFT MESSAGE FOR PT TO RETURN CALL  - OK FOR THE HUB TO RELAY  MESSAGEI could send Xoponex in place of the Albuterol, but it's usually not covered. Ask if she wants me to do that OR send the Albuterol to a different pharmacy.   -

## 2023-08-30 NOTE — TELEPHONE ENCOUNTER
HUB RELAYED MESSAGE TO PATIENT WHO VERBALLY UNDERSTOOD. PATIENT STATED THAT HER  WS ABLE TO FIND THE INHALER FOR HER SO SHE HAS AN INHALER NOW.

## 2023-10-16 ENCOUNTER — TELEPHONE (OUTPATIENT)
Dept: FAMILY MEDICINE CLINIC | Facility: CLINIC | Age: 68
End: 2023-10-16

## 2023-10-16 ENCOUNTER — OFFICE VISIT (OUTPATIENT)
Dept: ORTHOPEDIC SURGERY | Facility: CLINIC | Age: 68
End: 2023-10-16
Payer: COMMERCIAL

## 2023-10-16 VITALS — BODY MASS INDEX: 46.58 KG/M2 | TEMPERATURE: 97.1 F | HEIGHT: 63 IN | WEIGHT: 262.9 LBS

## 2023-10-16 DIAGNOSIS — R52 PAIN: Primary | ICD-10-CM

## 2023-10-16 DIAGNOSIS — M17.0 ARTHRITIS OF BOTH KNEES: ICD-10-CM

## 2023-10-16 DIAGNOSIS — E66.01 OBESITY, MORBID, BMI 40.0-49.9: ICD-10-CM

## 2023-10-16 PROCEDURE — 99214 OFFICE O/P EST MOD 30 MIN: CPT | Performed by: ORTHOPAEDIC SURGERY

## 2023-10-16 PROCEDURE — 20610 DRAIN/INJ JOINT/BURSA W/O US: CPT | Performed by: ORTHOPAEDIC SURGERY

## 2023-10-16 PROCEDURE — 73562 X-RAY EXAM OF KNEE 3: CPT | Performed by: ORTHOPAEDIC SURGERY

## 2023-10-16 RX ORDER — METHYLPREDNISOLONE ACETATE 80 MG/ML
1 INJECTION, SUSPENSION INTRA-ARTICULAR; INTRALESIONAL; INTRAMUSCULAR; SOFT TISSUE
Status: COMPLETED | OUTPATIENT
Start: 2023-10-16 | End: 2023-10-16

## 2023-10-16 RX ORDER — LIDOCAINE HYDROCHLORIDE 10 MG/ML
2 INJECTION, SOLUTION EPIDURAL; INFILTRATION; INTRACAUDAL; PERINEURAL
Status: COMPLETED | OUTPATIENT
Start: 2023-10-16 | End: 2023-10-16

## 2023-10-16 RX ADMIN — METHYLPREDNISOLONE ACETATE 1 ML: 80 INJECTION, SUSPENSION INTRA-ARTICULAR; INTRALESIONAL; INTRAMUSCULAR; SOFT TISSUE at 08:57

## 2023-10-16 RX ADMIN — LIDOCAINE HYDROCHLORIDE 2 ML: 10 INJECTION, SOLUTION EPIDURAL; INFILTRATION; INTRACAUDAL; PERINEURAL at 08:57

## 2023-10-16 NOTE — TELEPHONE ENCOUNTER
Hub staff attempted to follow warm transfer process and was unsuccessful     Caller: Marina Hugo    Relationship to patient: Self    Best call back number: [phone]     Patient is needing: NEEDING TO SCHEDULE FLU AND PNEUMONIA SHOT

## 2023-10-16 NOTE — PROGRESS NOTES
"Patient Name: Marina Hugo   YOB: 1955  Referring Primary Care Physician: Dwayne Allen MD  BMI: Body mass index is 46.57 kg/m².    Chief Complaint:    Chief Complaint   Patient presents with    Right Knee - Follow-up    Left Knee - Follow-up   From before:.  Marina is seen today complaining of left knee pain.  Said she been seeing Dr. Matthews who recommended total knee replacement.  She had been following with Dr. Walker and his nurse practitioner been giving her injections in the knee.  She said the injections worked pretty well.  They told her to call to schedule surgery and apparently she did says she did not receive a lot of information she had talked with a friend who is a therapist and wanted had surgery and she wanted to switch care to me.  She has not had physical therapy.  Her current BMI is 50.  She did have a lap band in about 2003 but had some erosion for it and had it removed in 2005 she does have diabetes which is medically controlled at this point and she has not had Neah injectables or GLP type products to date.  Her most recent A1c is 7.4 and she is seen with her .     HPI:     Marina Hugo is a 68 y.o. female who presents today for evaluation of   Chief Complaint   Patient presents with    Right Knee - Follow-up    Left Knee - Follow-up   .  Patient is seen back today on bilateral knees they are hurting her a lot.  See the before information above.  She saw her primary care doctor she discussed it and she has now been on Ozempic for little more than 2 months.  She is lost Wente 9 pounds.  She is also working physical therapy and has a home exercise program.  Her A1c noted in the chart about 9 months ago was at 9 but she says is gone down since then but her endocrinologist is in on epic.  Told her to try to figure that out.  She says that she has been told her blood sugars now \"prediabetic levels\".  She is also on Celebrex and Tylenol she seen with her  is also a " patient.      Subjective   Medications:   Home Medications:  Current Outpatient Medications on File Prior to Visit   Medication Sig    Acetaminophen (TYLENOL ARTHRITIS PAIN PO) Take  by mouth 4 (Four) Times a Day As Needed.    albuterol (PROVENTIL) (2.5 MG/3ML) 0.083% nebulizer solution Take 2.5 mg by nebulization Every 4 (Four) Hours As Needed for Wheezing.    albuterol sulfate HFA (ProAir HFA) 108 (90 Base) MCG/ACT inhaler Inhale 2 puffs Every 6 (Six) Hours As Needed for Wheezing.    amLODIPine (NORVASC) 10 MG tablet Take 1 tablet by mouth every night at bedtime.    atorvastatin (LIPITOR) 20 MG tablet Take 1 tablet by mouth Daily.    celecoxib (CeleBREX) 200 MG capsule Take 1 capsule by mouth 2 (Two) Times a Day.    cetirizine (zyrTEC) 10 MG tablet Take 1 tablet by mouth Every Night.    dicyclomine (BENTYL) 10 MG capsule Take 1 capsule by mouth 2 (Two) Times a Day.    empagliflozin (Jardiance) 10 MG tablet tablet Take 1 tablet by mouth every night at bedtime.    ferrous sulfate 325 (65 FE) MG tablet Take 1 tablet by mouth Every Night. With food    glimepiride (AMARYL) 4 MG tablet Take 1 tablet by mouth Every Morning Before Breakfast.    Insulin Glargine, 1 Unit Dial, (Toujeo SoloStar) 300 UNIT/ML solution pen-injector injection Inject 40 Units under the skin into the appropriate area as directed Daily.    Insulin Pen Needle (BD Pen Needle Mariel 2nd Gen) 32G X 4 MM misc USE DAILY WITH VICTOZA    metFORMIN ER (GLUCOPHAGE-XR) 500 MG 24 hr tablet Take 4 tablets by mouth Every Night.    pioglitazone (ACTOS) 30 MG tablet Take 1 tablet by mouth every night at bedtime.    Semaglutide,0.25 or 0.5MG/DOS, (OZEMPIC) 2 MG/3ML solution pen-injector Inject 0.5 mg under the skin into the appropriate area as directed 1 (One) Time Per Week.    Trelegy Ellipta 200-62.5-25 MCG/ACT aerosol powder  INHALE 1 PUFF BY MOUTH DAILY    valsartan-hydrochlorothiazide (DIOVAN-HCT) 160-12.5 MG per tablet Take 1 tablet by mouth Daily.    cefdinir  (OMNICEF) 300 MG capsule One cap PO BID for infection (Patient not taking: Reported on 10/16/2023)    methylPREDNISolone (MEDROL) 4 MG dose pack follow package directions (Patient not taking: Reported on 10/16/2023)    nystatin-triamcinolone (MYCOLOG) 661353-5.1 UNIT/GM-% ointment Apply 1 application topically to the appropriate area as directed 2 (Two) Times a Day. (Patient not taking: Reported on 10/16/2023)    ondansetron ODT (ZOFRAN-ODT) 4 MG disintegrating tablet Place 1 tablet on the tongue Every 8 (Eight) Hours As Needed for Nausea or Vomiting. (Patient not taking: Reported on 10/16/2023)    promethazine-dextromethorphan (PROMETHAZINE-DM) 6.25-15 MG/5ML syrup Take 5 mL by mouth 4 (Four) Times a Day As Needed for Cough. (Patient not taking: Reported on 10/16/2023)     No current facility-administered medications on file prior to visit.     Current Medications:  Scheduled Meds:  Continuous Infusions:No current facility-administered medications for this visit.    PRN Meds:.    I have reviewed the patient's medical history in detail and updated the computerized patient record.  Review and summarization of old records includes:    Past Medical History:   Diagnosis Date    Anemia     Anesthesia complication     SLOW TO WAKE UP    Arthritis     Asthma     Diabetes mellitus     Hyperlipidemia     Hypertension     Irritable bowel syndrome     Obesity     Rotator cuff tear     Routine eye exam due    Sleep apnea     MOUTH PIECE        Past Surgical History:   Procedure Laterality Date    CARPAL TUNNEL RELEASE Right      SECTION      COLONOSCOPY      LAPAROSCOPIC GASTRIC BANDING      SHOULDER ROTATOR CUFF REPAIR Right 2021    Procedure: RIGHT SHOULDER ARTHROSCOPY WITH ACROMIOPLASTY, ROTATOR CUFF AND LABRAL DEBRIDEMENT;  Surgeon: Raj Walker MD;  [place] [facility] [place] [facility] [place]: [place]opedics;  Laterality: Right;        Social History     Occupational History    Not on file   Tobacco Use    Smoking  "status: Never    Smokeless tobacco: Never   Vaping Use    Vaping Use: Never used   Substance and Sexual Activity    Alcohol use: No     Comment: Caffeine use: 2-3 cups daily    Drug use: No    Sexual activity: Defer      Social History     Social History Narrative    Not on file        Family History   Problem Relation Age of Onset    Anxiety disorder Mother     Colon cancer Mother     Depression Mother     Diabetes Mother     Hypertension Mother     Nephrolithiasis Mother     Macular degeneration Mother     Diabetes Father     Heart disease Father     Hypertension Father     Malig Hyperthermia Neg Hx        ROS: 14 point review of systems was performed and all other systems were reviewed and are negative except for documented findings in HPI and today's encounter.     Allergies:   Allergies   Allergen Reactions    Promethazine Unknown - High Severity     Was able to take Promethazine DM     Constitutional:  Denies fever, shaking or chills   Eyes:  Denies change in visual acuity   HENT:  Denies nasal congestion or sore throat   Respiratory:  Denies cough or shortness of breath   Cardiovascular:  Denies chest pain or severe LE edema   GI:  Denies abdominal pain, nausea, vomiting, bloody stools or diarrhea   Musculoskeletal:  Numbness, tingling, pain, or loss of motor function only as noted above in history of present illness.  : Denies painful urination or hematuria  Integument:  Denies rash, lesion or ulceration   Neurologic:  Denies headache or focal weakness  Endocrine:  Denies lymphadenopathy  Psych:  Denies confusion or change in mental status   Hem:  Denies active bleeding    OBJECTIVE:  Physical Exam: 68 y.o. female  Wt Readings from Last 3 Encounters:   10/16/23 119 kg (262 lb 14.4 oz)   08/24/23 129 kg (284 lb)   07/25/23 129 kg (284 lb)     Ht Readings from Last 1 Encounters:   10/16/23 160 cm (63\")     Body mass index is 46.57 kg/m².  Vitals:    10/16/23 0826   Temp: 97.1 °F (36.2 °C)     Vital signs " reviewed.     General Appearance:    Alert, cooperative, in no acute distress                  Eyes: conjunctiva clear  ENT: external ears and nose atraumatic  CV: no peripheral edema  Resp: normal respiratory effort  Skin: no rashes or wounds; normal turgor  Psych: mood and affect appropriate  Lymph: no nodes appreciated  Neuro: gross sensation intact  Vascular:  Palpable peripheral pulse in noted extremity  Musculoskeletal Extremities: Current BMI is at 46 she still has large soft tissue around her thighs knees have crepitation synovitis swelling and joint line tenderness with some stiffness    Radiology:   PA lateral 40 degree PA x-ray bilateral knees taken the office today complaints of pain with comparison views show advanced end-stage tricompartmental arthritis of bone-on-bone and osteophytes        Assessment:     ICD-10-CM ICD-9-CM   1. Pain  R52 780.96        MDM/Plan:   The diagnosis(es), natural history, pathophysiology and treatment for diagnosis(es) were discussed. Opportunity given and questions answered.  Biomechanics of pertinent body areas discussed.  When appropriate, the use of ambulatory aids discussed.    Biomechanics of pertinent body areas discussed.  When appropriate, the use of ambulatory aids discussed.  BMI:  The concept of BMI body mass index and its importance and implications discussed.    DIABETES:  Diabetic counseling and how it affects the diagnosis and treatment  Inflammation/pain control; with cold, heat, elevation and/or liniments discussed as appropriate  HOME EXERCISE/PT program encouraged  MEDICAL RECORDS reviewed from other provider(s) for past and current medical history pertinent to this complaint.  She says she will continue with the Ozempic and dietary changes she wants get injections in her knee today which were performed I told her she will need to get her A1c down and her soft tissue/BMI more acceptable but all that considered she like she is aiming for June 2024.  I  think that sounds reasonable as long as she sticks with her current course of treatment success and once again discussed surgery    10/16/2023    Dictated utilizing Dragon dictation      Large Joint Arthrocentesis: R knee  Date/Time: 10/16/2023 8:57 AM  Consent given by: patient  Site marked: site marked  Timeout: Immediately prior to procedure a time out was called to verify the correct patient, procedure, equipment, support staff and site/side marked as required   Supporting Documentation  Indications: pain, joint swelling and diagnostic evaluation   Procedure Details  Location: knee - R knee  Preparation: Patient was prepped and draped in the usual sterile fashion  Needle gauge: 21G.  Medications administered: 1 mL methylPREDNISolone acetate 80 MG/ML; 2 mL lidocaine PF 1% 1 %  Patient tolerance: patient tolerated the procedure well with no immediate complications      Large Joint Arthrocentesis: L knee  Date/Time: 10/16/2023 8:57 AM  Consent given by: patient  Site marked: site marked  Timeout: Immediately prior to procedure a time out was called to verify the correct patient, procedure, equipment, support staff and site/side marked as required   Supporting Documentation  Indications: pain   Procedure Details  Location: knee - L knee  Preparation: Patient was prepped and draped in the usual sterile fashion  Needle gauge: 21G.  Medications administered: 1 mL methylPREDNISolone acetate 80 MG/ML; 2 mL lidocaine PF 1% 1 %  Patient tolerance: patient tolerated the procedure well with no immediate complications

## 2023-10-18 ENCOUNTER — OFFICE VISIT (OUTPATIENT)
Dept: FAMILY MEDICINE CLINIC | Facility: CLINIC | Age: 68
End: 2023-10-18
Payer: COMMERCIAL

## 2023-10-18 VITALS
RESPIRATION RATE: 16 BRPM | SYSTOLIC BLOOD PRESSURE: 114 MMHG | TEMPERATURE: 97.9 F | HEIGHT: 63 IN | WEIGHT: 262 LBS | BODY MASS INDEX: 46.42 KG/M2 | HEART RATE: 84 BPM | DIASTOLIC BLOOD PRESSURE: 71 MMHG | OXYGEN SATURATION: 96 %

## 2023-10-18 DIAGNOSIS — E11.9 TYPE 2 DIABETES MELLITUS WITHOUT COMPLICATION, WITHOUT LONG-TERM CURRENT USE OF INSULIN: Primary | Chronic | ICD-10-CM

## 2023-10-18 DIAGNOSIS — J45.41 MODERATE PERSISTENT ASTHMA WITH ACUTE EXACERBATION: Chronic | ICD-10-CM

## 2023-10-18 DIAGNOSIS — I10 ESSENTIAL HYPERTENSION: Chronic | ICD-10-CM

## 2023-10-18 DIAGNOSIS — K58.9 IRRITABLE BOWEL SYNDROME WITHOUT DIARRHEA: Chronic | ICD-10-CM

## 2023-10-18 DIAGNOSIS — E66.01 MORBID (SEVERE) OBESITY DUE TO EXCESS CALORIES: ICD-10-CM

## 2023-10-18 DIAGNOSIS — Z12.11 SCREENING FOR COLON CANCER: ICD-10-CM

## 2023-10-18 DIAGNOSIS — G57.03 PIRIFORMIS SYNDROME OF BOTH SIDES: Chronic | ICD-10-CM

## 2023-10-18 DIAGNOSIS — E78.2 MIXED HYPERLIPIDEMIA: Chronic | ICD-10-CM

## 2023-10-18 RX ORDER — VALSARTAN AND HYDROCHLOROTHIAZIDE 160; 12.5 MG/1; MG/1
1 TABLET, FILM COATED ORAL DAILY
Qty: 90 TABLET | Refills: 1 | Status: SHIPPED | OUTPATIENT
Start: 2023-10-18

## 2023-10-18 RX ORDER — CELECOXIB 200 MG/1
200 CAPSULE ORAL 2 TIMES DAILY
Qty: 180 CAPSULE | Refills: 1 | Status: SHIPPED | OUTPATIENT
Start: 2023-10-18

## 2023-10-18 RX ORDER — ATORVASTATIN CALCIUM 20 MG/1
20 TABLET, FILM COATED ORAL DAILY
Qty: 90 TABLET | Refills: 1 | Status: SHIPPED | OUTPATIENT
Start: 2023-10-18

## 2023-10-18 RX ORDER — AZELASTINE HYDROCHLORIDE 137 UG/1
SPRAY, METERED NASAL
COMMUNITY
Start: 2023-09-12

## 2023-10-18 RX ORDER — INSULIN GLARGINE 300 U/ML
40 INJECTION, SOLUTION SUBCUTANEOUS DAILY
Qty: 18 ML | Refills: 1 | Status: SHIPPED | OUTPATIENT
Start: 2023-10-18

## 2023-10-18 RX ORDER — GLIMEPIRIDE 4 MG/1
4 TABLET ORAL
Qty: 90 TABLET | Refills: 1 | Status: SHIPPED | OUTPATIENT
Start: 2023-10-18

## 2023-10-18 RX ORDER — AMLODIPINE BESYLATE 10 MG/1
10 TABLET ORAL
Qty: 90 TABLET | Refills: 1 | Status: SHIPPED | OUTPATIENT
Start: 2023-10-18

## 2023-10-18 RX ORDER — METFORMIN HYDROCHLORIDE 500 MG/1
2000 TABLET, EXTENDED RELEASE ORAL NIGHTLY
Qty: 360 TABLET | Refills: 1 | Status: SHIPPED | OUTPATIENT
Start: 2023-10-18

## 2023-10-18 RX ORDER — BENZONATATE 200 MG/1
200 CAPSULE ORAL 2 TIMES DAILY PRN
COMMUNITY
Start: 2023-10-12 | End: 2023-10-18

## 2023-10-18 RX ORDER — FLUTICASONE FUROATE, UMECLIDINIUM BROMIDE AND VILANTEROL TRIFENATATE 200; 62.5; 25 UG/1; UG/1; UG/1
1 POWDER RESPIRATORY (INHALATION) DAILY
Qty: 180 EACH | Refills: 1 | Status: SHIPPED | OUTPATIENT
Start: 2023-10-18

## 2023-10-18 RX ORDER — DICYCLOMINE HYDROCHLORIDE 10 MG/1
10 CAPSULE ORAL 2 TIMES DAILY
Qty: 180 CAPSULE | Refills: 1 | Status: SHIPPED | OUTPATIENT
Start: 2023-10-18

## 2023-10-18 RX ORDER — PIOGLITAZONEHYDROCHLORIDE 30 MG/1
30 TABLET ORAL
Qty: 90 TABLET | Refills: 1 | Status: SHIPPED | OUTPATIENT
Start: 2023-10-18

## 2023-10-20 ENCOUNTER — FLU SHOT (OUTPATIENT)
Dept: FAMILY MEDICINE CLINIC | Facility: CLINIC | Age: 68
End: 2023-10-20
Payer: COMMERCIAL

## 2023-10-20 DIAGNOSIS — Z23 NEED FOR INFLUENZA VACCINATION: Primary | ICD-10-CM

## 2023-10-20 NOTE — PROGRESS NOTES
Injection  Injection performed in RT by Caitie Armendariz MA. Patient tolerated the procedure well without complications.  10/20/23   Caitie Armendariz MA

## 2023-10-26 DIAGNOSIS — D64.9 ANEMIA, UNSPECIFIED TYPE: Chronic | ICD-10-CM

## 2023-10-26 RX ORDER — FERROUS SULFATE 325(65) MG
1 TABLET ORAL NIGHTLY
Qty: 30 TABLET | Refills: 0 | OUTPATIENT
Start: 2023-10-26

## 2023-10-26 NOTE — TELEPHONE ENCOUNTER
Last CBC on her showed no anemia.  I placed an order last week and would hold off on refilling this until she completes her labs and we know what her hemoglobin is.

## 2023-11-13 ENCOUNTER — TELEPHONE (OUTPATIENT)
Dept: FAMILY MEDICINE CLINIC | Facility: CLINIC | Age: 68
End: 2023-11-13
Payer: COMMERCIAL

## 2023-11-13 NOTE — TELEPHONE ENCOUNTER
Spoke with patient.    Per Dr Allen patient will need to get the OK from Dr Heredia to proceed with the vaccination process for the pneumonia vaccine. Verbal understanding implied.     Patient advised she will call back once she has spoken with Dr Heredia's office

## 2023-11-13 NOTE — TELEPHONE ENCOUNTER
Caller: Marina Hugo    Relationship to patient: Self    Best call back number: [phone]    Patient is needing: PATIENT STATES SHE WILL NOT BE ABLE TO COME TO 3:00 APPOINTMENT TODAY, HAS A MEETING. PATIENT WILL COME BY OFFICE TO TALK TO ANI

## 2023-11-20 NOTE — TELEPHONE ENCOUNTER
Left message for pt. Ok for the hub to relay message  -  Please call pt and let her know and that she can get a nurse's visit here.

## 2023-11-27 ENCOUNTER — TELEPHONE (OUTPATIENT)
Dept: FAMILY MEDICINE CLINIC | Facility: CLINIC | Age: 68
End: 2023-11-27

## 2023-11-27 NOTE — TELEPHONE ENCOUNTER
Hub staff attempted to follow warm transfer process and was unsuccessful     Caller: Marina Hugo    Relationship to patient: Self    Best call back number: [phone]     Patient is needing: PLEASE CALL PATIENT TO RESCHEDULE TODAYS APPT, SHE IS UNABLE TO MAKE IT DUE TO A WORK MEETING.

## 2024-01-16 ENCOUNTER — TELEPHONE (OUTPATIENT)
Dept: ORTHOPEDIC SURGERY | Facility: CLINIC | Age: 69
End: 2024-01-16

## 2024-01-24 ENCOUNTER — PRIOR AUTHORIZATION (OUTPATIENT)
Dept: FAMILY MEDICINE CLINIC | Facility: CLINIC | Age: 69
End: 2024-01-24

## 2024-01-25 DIAGNOSIS — D64.9 ANEMIA, UNSPECIFIED TYPE: Chronic | ICD-10-CM

## 2024-01-26 RX ORDER — FERROUS SULFATE 325(65) MG
1 TABLET ORAL NIGHTLY
Qty: 90 TABLET | Refills: 1 | Status: SHIPPED | OUTPATIENT
Start: 2024-01-26

## 2024-01-29 ENCOUNTER — OFFICE VISIT (OUTPATIENT)
Dept: ORTHOPEDIC SURGERY | Facility: CLINIC | Age: 69
End: 2024-01-29
Payer: COMMERCIAL

## 2024-01-29 VITALS — HEIGHT: 63 IN | WEIGHT: 265.6 LBS | BODY MASS INDEX: 47.06 KG/M2 | TEMPERATURE: 97.5 F

## 2024-01-29 DIAGNOSIS — M17.0 ARTHRITIS OF BOTH KNEES: Primary | ICD-10-CM

## 2024-01-29 PROCEDURE — 20610 DRAIN/INJ JOINT/BURSA W/O US: CPT | Performed by: NURSE PRACTITIONER

## 2024-01-29 PROCEDURE — 99213 OFFICE O/P EST LOW 20 MIN: CPT | Performed by: NURSE PRACTITIONER

## 2024-01-29 RX ORDER — ALBUTEROL SULFATE 90 UG/1
2 AEROSOL, METERED RESPIRATORY (INHALATION) EVERY 4 HOURS PRN
COMMUNITY

## 2024-01-29 RX ORDER — LIDOCAINE HYDROCHLORIDE 10 MG/ML
2 INJECTION, SOLUTION EPIDURAL; INFILTRATION; INTRACAUDAL; PERINEURAL
Status: COMPLETED | OUTPATIENT
Start: 2024-01-29 | End: 2024-01-29

## 2024-01-29 RX ORDER — METHYLPREDNISOLONE ACETATE 80 MG/ML
80 INJECTION, SUSPENSION INTRA-ARTICULAR; INTRALESIONAL; INTRAMUSCULAR; SOFT TISSUE
Status: COMPLETED | OUTPATIENT
Start: 2024-01-29 | End: 2024-01-29

## 2024-01-29 RX ADMIN — LIDOCAINE HYDROCHLORIDE 2 ML: 10 INJECTION, SOLUTION EPIDURAL; INFILTRATION; INTRACAUDAL; PERINEURAL at 15:23

## 2024-01-29 RX ADMIN — LIDOCAINE HYDROCHLORIDE 2 ML: 10 INJECTION, SOLUTION EPIDURAL; INFILTRATION; INTRACAUDAL; PERINEURAL at 15:24

## 2024-01-29 RX ADMIN — METHYLPREDNISOLONE ACETATE 80 MG: 80 INJECTION, SUSPENSION INTRA-ARTICULAR; INTRALESIONAL; INTRAMUSCULAR; SOFT TISSUE at 15:24

## 2024-01-29 RX ADMIN — METHYLPREDNISOLONE ACETATE 80 MG: 80 INJECTION, SUSPENSION INTRA-ARTICULAR; INTRALESIONAL; INTRAMUSCULAR; SOFT TISSUE at 15:23

## 2024-01-29 NOTE — PROGRESS NOTES
Marina Hugo is here today for worsening Bilateral knee pain. Knee injection was discussed with the patient in detail, including indication, risks, benefits, and alternatives. Verbal consent was given for the procedure. Injection site was aseptically prepared.        KNEE Injection Procedure Note:    Large Joint Arthrocentesis: R knee  Date/Time: 1/29/2024 3:23 PM  Consent given by: patient  Site marked: site marked  Timeout: Immediately prior to procedure a time out was called to verify the correct patient, procedure, equipment, support staff and site/side marked as required   Supporting Documentation  Indications: pain, joint swelling and diagnostic evaluation   Procedure Details  Location: knee - R knee  Preparation: Patient was prepped and draped in the usual sterile fashion  Needle gauge: 21G.  Medications administered: 80 mg methylPREDNISolone acetate 80 MG/ML; 2 mL lidocaine PF 1% 1 %  Patient tolerance: patient tolerated the procedure well with no immediate complications      Large Joint Arthrocentesis: L knee  Date/Time: 1/29/2024 3:24 PM  Consent given by: patient  Site marked: site marked  Timeout: Immediately prior to procedure a time out was called to verify the correct patient, procedure, equipment, support staff and site/side marked as required   Supporting Documentation  Indications: pain   Procedure Details  Location: knee - L knee  Preparation: Patient was prepped and draped in the usual sterile fashion  Needle gauge: 21G.  Medications administered: 80 mg methylPREDNISolone acetate 80 MG/ML; 2 mL lidocaine PF 1% 1 %  Patient tolerance: patient tolerated the procedure well with no immediate complications     Marina Hugo tolerated the procedure well. A Bandage was applied to the injection site. At the conclusion of the injection I discussed the importance of continued quad strengthening exercises on a daily basis. I will see the patient back if the symptoms should fail to improve or worsen.    -  Spent time discussing the possibility of surgery with patient.  She has seen Dr. Fajardo in the past and was hoping to be able to have knee replacement surgery in the beginning of June.  Patient is currently decreasing weight and her BMI is 47 today.  I discussed with her that this needs to be below 40 in order to have surgery.  She also is a diabetic and her most recent hemoglobin A1c from 10 months ago was 9.4.  2 months prior that she was 7.4 so she is understands she needs to be below 7.5 to have surgery.  I discussed that this is an increased risk of infection along with her BMI.  She verbalized understanding.  I also discussed the possibility of physical therapy as the injections are not lasting her quite as long.  I placed an order for formal physical therapy today.  She can schedule to see Dr. Fajardo in 4 to 6 weeks to see her progress of weight loss and the possibility of surgery in the future.  She verbalized understanding is in agreement this plan.    Lalita Germain, APRN  1/29/2024    Dictated Utilizing Dragon Dictation

## 2024-02-06 ENCOUNTER — OFFICE VISIT (OUTPATIENT)
Dept: FAMILY MEDICINE CLINIC | Facility: CLINIC | Age: 69
End: 2024-02-06
Payer: COMMERCIAL

## 2024-02-06 VITALS
RESPIRATION RATE: 16 BRPM | OXYGEN SATURATION: 94 % | HEART RATE: 82 BPM | HEIGHT: 63 IN | SYSTOLIC BLOOD PRESSURE: 138 MMHG | BODY MASS INDEX: 46.25 KG/M2 | WEIGHT: 261 LBS | DIASTOLIC BLOOD PRESSURE: 74 MMHG | TEMPERATURE: 98.4 F

## 2024-02-06 DIAGNOSIS — E11.65 TYPE 2 DIABETES MELLITUS WITH HYPERGLYCEMIA, WITHOUT LONG-TERM CURRENT USE OF INSULIN: ICD-10-CM

## 2024-02-06 DIAGNOSIS — J06.9 ACUTE URI: Primary | ICD-10-CM

## 2024-02-06 PROCEDURE — 99213 OFFICE O/P EST LOW 20 MIN: CPT | Performed by: FAMILY MEDICINE

## 2024-02-06 RX ORDER — NYSTATIN AND TRIAMCINOLONE ACETONIDE 100000; 1 [USP'U]/G; MG/G
1 OINTMENT TOPICAL 2 TIMES DAILY
COMMUNITY
Start: 2023-12-19

## 2024-02-06 RX ORDER — LEVALBUTEROL INHALATION SOLUTION 0.63 MG/3ML
1 SOLUTION RESPIRATORY (INHALATION) EVERY 6 HOURS PRN
COMMUNITY
Start: 2023-11-04

## 2024-02-06 RX ORDER — AZITHROMYCIN 250 MG/1
TABLET, FILM COATED ORAL
Qty: 6 TABLET | Refills: 0 | Status: SHIPPED | OUTPATIENT
Start: 2024-02-06

## 2024-02-06 NOTE — PROGRESS NOTES
"Subjective   Marina Hugo is a 68 y.o. female.     CC: URI-Sx         Management of DM    Diabetes  Pertinent negatives for diabetes include no chest pain.   URI   Associated symptoms include coughing and a sore throat. Pertinent negatives include no chest pain.        Patient is a schoolteacher with a history of asthma comes in today reporting a 1+ day history of starting to get a mild sore throat and mild cough.  No fever/chills/dyspnea at this time.    Patient also has questions about her current dose of Ozempic that she is getting at her pharmacy.    The following portions of the patient's history were reviewed and updated as appropriate: allergies, current medications, past family history, past medical history, past social history, past surgical history, and problem list.    Review of Systems   Constitutional:  Negative for activity change, chills and fever.   HENT:  Positive for sore throat.    Respiratory:  Positive for cough.    Cardiovascular:  Negative for chest pain.   Psychiatric/Behavioral:  Negative for dysphoric mood.        /74   Pulse 82   Temp 98.4 °F (36.9 °C) (Oral)   Resp 16   Ht 160 cm (63\")   Wt 118 kg (261 lb)   SpO2 94%   BMI 46.23 kg/m²     Objective   Physical Exam  Constitutional:       General: She is not in acute distress.     Appearance: She is well-developed.   HENT:      Right Ear: Tympanic membrane and ear canal normal.      Left Ear: Tympanic membrane and ear canal normal.   Cardiovascular:      Rate and Rhythm: Normal rate and regular rhythm.   Pulmonary:      Effort: Pulmonary effort is normal.      Breath sounds: Normal breath sounds.   Neurological:      Mental Status: She is alert and oriented to person, place, and time.   Psychiatric:         Behavior: Behavior normal.         Thought Content: Thought content normal.         Assessment & Plan   Diagnoses and all orders for this visit:    1. Acute URI (Primary)  -     azithromycin (Zithromax Z-Ted) 250 MG " tablet; Take 2 tablets the first day, then 1 tablet daily for 4 days.  Dispense: 6 tablet; Refill: 0    2. Type 2 diabetes mellitus with hyperglycemia, without long-term current use of insulin    Patient's labs are overdue and she is to complete that next week.  Discussed her Ozempic dosing and answered all patient's questions.  Patient to return to clinic in April.

## 2024-02-13 ENCOUNTER — TELEPHONE (OUTPATIENT)
Dept: FAMILY MEDICINE CLINIC | Facility: CLINIC | Age: 69
End: 2024-02-13
Payer: COMMERCIAL

## 2024-02-21 ENCOUNTER — PRIOR AUTHORIZATION (OUTPATIENT)
Dept: FAMILY MEDICINE CLINIC | Facility: CLINIC | Age: 69
End: 2024-02-21
Payer: COMMERCIAL

## 2024-02-21 NOTE — TELEPHONE ENCOUNTER
Your request has been approved  CaseId:[id number];Status:Approved;Review Type:Prior Auth;Coverage Start Date:01/22/2024;Coverage End Date:02/20/2025;

## 2024-03-13 ENCOUNTER — TELEPHONE (OUTPATIENT)
Dept: ORTHOPEDIC SURGERY | Facility: CLINIC | Age: 69
End: 2024-03-13

## 2024-03-13 NOTE — TELEPHONE ENCOUNTER
Caller: Marina Hugo    Relationship: Self    Best call back number: [phone]    What is the best time to reach you: ANY    Who are you requesting to speak with (clinical staff, provider,  specific staff member): CLINICAL     Do you know the name of the person who called: PATIENT INCOMING     What was the call regarding: PATIENT HAS AN APPT TODAY. DR POLLARD ORDERED BLOOD WORK AND SHE DOES NOT HAVE THAT DONE UNTIL 3-20-24. DR GARCIA TOLD HER HE WANTED TO SEE THOSE RESULTS AT HER NEXT VISIT. DOES SHE NEED TO WAIT UNTIL THAT ITME OR GO AHEAD AND COME IN TODAY. PLEASE CALL TO DISCUSS. OKAY TO LEAVE A VOICEMAIL.

## 2024-03-13 NOTE — TELEPHONE ENCOUNTER
If she is getting the new blood work to check her diabetes. It is better to reschedule her visit with Dr. Fajardo until after she sees Dr. Allen so we can use the new blood work in determining her suitability for surgery. Thank you!

## 2024-03-21 LAB
ALBUMIN SERPL-MCNC: 4.4 G/DL (ref 3.9–4.9)
ALBUMIN/GLOB SERPL: 1.9 {RATIO} (ref 1.2–2.2)
ALP SERPL-CCNC: 99 IU/L (ref 44–121)
ALT SERPL-CCNC: 12 IU/L (ref 0–32)
AST SERPL-CCNC: 12 IU/L (ref 0–40)
BASOPHILS # BLD AUTO: 0.1 X10E3/UL (ref 0–0.2)
BASOPHILS NFR BLD AUTO: 1 %
BILIRUB SERPL-MCNC: 0.5 MG/DL (ref 0–1.2)
BUN SERPL-MCNC: 21 MG/DL (ref 8–27)
BUN/CREAT SERPL: 24 (ref 12–28)
CALCIUM SERPL-MCNC: 10.4 MG/DL (ref 8.7–10.3)
CHLORIDE SERPL-SCNC: 100 MMOL/L (ref 96–106)
CHOLEST SERPL-MCNC: 159 MG/DL (ref 100–199)
CO2 SERPL-SCNC: 25 MMOL/L (ref 20–29)
CREAT SERPL-MCNC: 0.86 MG/DL (ref 0.57–1)
EGFRCR SERPLBLD CKD-EPI 2021: 73 ML/MIN/1.73
EOSINOPHIL # BLD AUTO: 0.2 X10E3/UL (ref 0–0.4)
EOSINOPHIL NFR BLD AUTO: 3 %
ERYTHROCYTE [DISTWIDTH] IN BLOOD BY AUTOMATED COUNT: 13 % (ref 11.7–15.4)
GLOBULIN SER CALC-MCNC: 2.3 G/DL (ref 1.5–4.5)
GLUCOSE SERPL-MCNC: 95 MG/DL (ref 70–99)
HBA1C MFR BLD: 6.7 % (ref 4.8–5.6)
HCT VFR BLD AUTO: 45.2 % (ref 34–46.6)
HDLC SERPL-MCNC: 70 MG/DL
HGB BLD-MCNC: 14.9 G/DL (ref 11.1–15.9)
IMM GRANULOCYTES # BLD AUTO: 0.1 X10E3/UL (ref 0–0.1)
IMM GRANULOCYTES NFR BLD AUTO: 1 %
LDLC SERPL CALC-MCNC: 74 MG/DL (ref 0–99)
LYMPHOCYTES # BLD AUTO: 2.4 X10E3/UL (ref 0.7–3.1)
LYMPHOCYTES NFR BLD AUTO: 30 %
MCH RBC QN AUTO: 31.4 PG (ref 26.6–33)
MCHC RBC AUTO-ENTMCNC: 33 G/DL (ref 31.5–35.7)
MCV RBC AUTO: 95 FL (ref 79–97)
MICROALBUMIN UR-MCNC: 8.2 UG/ML
MONOCYTES # BLD AUTO: 0.5 X10E3/UL (ref 0.1–0.9)
MONOCYTES NFR BLD AUTO: 7 %
NEUTROPHILS # BLD AUTO: 4.7 X10E3/UL (ref 1.4–7)
NEUTROPHILS NFR BLD AUTO: 58 %
PLATELET # BLD AUTO: 290 X10E3/UL (ref 150–450)
POTASSIUM SERPL-SCNC: 5.1 MMOL/L (ref 3.5–5.2)
PROT SERPL-MCNC: 6.7 G/DL (ref 6–8.5)
RBC # BLD AUTO: 4.75 X10E6/UL (ref 3.77–5.28)
SODIUM SERPL-SCNC: 141 MMOL/L (ref 134–144)
TRIGL SERPL-MCNC: 82 MG/DL (ref 0–149)
TSH SERPL DL<=0.005 MIU/L-ACNC: 1.68 UIU/ML (ref 0.45–4.5)
VLDLC SERPL CALC-MCNC: 15 MG/DL (ref 5–40)
WBC # BLD AUTO: 7.9 X10E3/UL (ref 3.4–10.8)

## 2024-06-04 ENCOUNTER — TELEPHONE (OUTPATIENT)
Dept: FAMILY MEDICINE CLINIC | Facility: CLINIC | Age: 69
End: 2024-06-04
Payer: COMMERCIAL

## 2024-06-04 NOTE — TELEPHONE ENCOUNTER
Caller: Marina Hugo    Relationship: Self    Best call back number: [phone]    What is the best time to reach you: ANYTIME     Who are you requesting to speak with (clinical staff, provider,  specific staff member): JIN - PRACTICE MANAGER     What was the call regarding: PATIENT STATES SHE IS NEEDING TO DISCUSS GETTING THE CODING CHANGED ON HER LAB WORK FROM 03/20/2024 DUE TO HER INSURANCE NOT WANTING TO COVER THE LABS DUE TO THE CODING.     PATIENT IS REQUESTING A CALL BACK.

## 2024-06-10 NOTE — TELEPHONE ENCOUNTER
Caller: Marina Hugo    Relationship: Self    Best call back number: [phone]    What is the best time to reach you: AFTER 3:45 PM    Who are you requesting to speak with (clinical staff, provider,  specific staff member): JIN    Do you know the name of the person who called: PATIENT    What was the call regarding: RETURNING JIN'S CALL    Is it okay if the provider responds through MyChart: NO    ATTEMPTED A WARM TRANSFER, UNSUCCESSFUL

## 2024-06-10 NOTE — TELEPHONE ENCOUNTER
I called patient back and let her know that labs are coded correctly. She stated to me that her statement states they billed [employer], which is her old insurance. I advised for her to call [facility] and talk to them to verify what insurance they have listed for her. I provided her with [email or web address] phone number.

## 2024-06-11 ENCOUNTER — TELEPHONE (OUTPATIENT)
Dept: ORTHOPEDIC SURGERY | Facility: CLINIC | Age: 69
End: 2024-06-11
Payer: COMMERCIAL

## 2024-06-11 NOTE — TELEPHONE ENCOUNTER
Called and spoke with the patient and she was unclear of the referral for surgery.  She has experienced some difficulties in getting her medication for weight loss and knows that she is not under her BMI at this point anyways to discuss surgery.  She would like to change her appt with me on 6/17 to B/L injections, it has been about 4 and half months since her last injections. Please change her appointment line to bilateral injections. She is aware this would put her out 3 months for surgery.  I also discussed referring her to Dr. Kruger.  We will do this at her appointment next week if she wants to proceed with that route just to get to meet him and spend some time getting her weight back down to be an ideal candidate for surgery.

## 2024-06-11 NOTE — TELEPHONE ENCOUNTER
Provider: SARA    Caller: PATIENT    Phone Number: [phone]    Reason for Call: PATIENT CALLED BACK TO R/S APPT ON 06/17/24. SHE STATES SHE WOULD LIKE TO SPEAK WITH MS. RUIZ DIRECTLY BEFORE SHE R/S WITH ANOTHER PROVIDER. EXPLAINED THAT MS. RUIZ DOES NOT DISCUSS SURGERY PER APPT NOTE.

## 2024-06-17 ENCOUNTER — OFFICE VISIT (OUTPATIENT)
Dept: ORTHOPEDIC SURGERY | Facility: CLINIC | Age: 69
End: 2024-06-17
Payer: COMMERCIAL

## 2024-06-17 VITALS — HEIGHT: 63 IN | BODY MASS INDEX: 49.12 KG/M2 | WEIGHT: 277.2 LBS | TEMPERATURE: 98.4 F

## 2024-06-17 DIAGNOSIS — M17.0 ARTHRITIS OF BOTH KNEES: ICD-10-CM

## 2024-06-17 DIAGNOSIS — R52 PAIN: Primary | ICD-10-CM

## 2024-06-17 PROCEDURE — 99213 OFFICE O/P EST LOW 20 MIN: CPT | Performed by: NURSE PRACTITIONER

## 2024-06-17 PROCEDURE — 73562 X-RAY EXAM OF KNEE 3: CPT | Performed by: NURSE PRACTITIONER

## 2024-06-17 PROCEDURE — 20610 DRAIN/INJ JOINT/BURSA W/O US: CPT | Performed by: NURSE PRACTITIONER

## 2024-06-17 RX ORDER — LIDOCAINE HYDROCHLORIDE 10 MG/ML
2 INJECTION, SOLUTION EPIDURAL; INFILTRATION; INTRACAUDAL; PERINEURAL
Status: COMPLETED | OUTPATIENT
Start: 2024-06-17 | End: 2024-06-17

## 2024-06-17 RX ORDER — METHYLPREDNISOLONE ACETATE 80 MG/ML
80 INJECTION, SUSPENSION INTRA-ARTICULAR; INTRALESIONAL; INTRAMUSCULAR; SOFT TISSUE
Status: COMPLETED | OUTPATIENT
Start: 2024-06-17 | End: 2024-06-17

## 2024-06-17 RX ADMIN — LIDOCAINE HYDROCHLORIDE 2 ML: 10 INJECTION, SOLUTION EPIDURAL; INFILTRATION; INTRACAUDAL; PERINEURAL at 09:30

## 2024-06-17 RX ADMIN — METHYLPREDNISOLONE ACETATE 80 MG: 80 INJECTION, SUSPENSION INTRA-ARTICULAR; INTRALESIONAL; INTRAMUSCULAR; SOFT TISSUE at 09:30

## 2024-06-17 NOTE — PROGRESS NOTES
Marina Hugo is here today for worsening Bilateral knee pain. Knee injection was discussed with the patient in detail, including indication, risks, benefits, and alternatives. Verbal consent was given for the procedure. Injection site was aseptically prepared.    Radiology:   AP, lateral, 40 degree PA of the bilateral knee obtained in the office today due to pain, with comparison views shows advanced tricompartmental degenerative changes, most significantly  medial and patellofemoral compartment with osteophyte formation and subchondral sclerosis      KNEE Injection Procedure Note:    Large Joint Arthrocentesis: R knee  Date/Time: 6/17/2024 9:30 AM  Consent given by: patient  Site marked: site marked  Timeout: Immediately prior to procedure a time out was called to verify the correct patient, procedure, equipment, support staff and site/side marked as required   Supporting Documentation  Indications: pain, joint swelling and diagnostic evaluation   Procedure Details  Location: knee - R knee  Preparation: Patient was prepped and draped in the usual sterile fashion  Needle gauge: 21G.  Medications administered: 80 mg methylPREDNISolone acetate 80 MG/ML; 2 mL lidocaine PF 1% 1 %  Patient tolerance: patient tolerated the procedure well with no immediate complications      Large Joint Arthrocentesis: L knee  Date/Time: 6/17/2024 9:30 AM  Consent given by: patient  Site marked: site marked  Timeout: Immediately prior to procedure a time out was called to verify the correct patient, procedure, equipment, support staff and site/side marked as required   Supporting Documentation  Indications: pain   Procedure Details  Location: knee - L knee  Preparation: Patient was prepped and draped in the usual sterile fashion  Needle gauge: 21G.  Medications administered: 80 mg methylPREDNISolone acetate 80 MG/ML; 2 mL lidocaine PF 1% 1 %  Patient tolerance: patient tolerated the procedure well with no immediate complications      Marina NICHOLAS Oanh tolerated the procedure well. A Bandage was applied to the injection site. At the conclusion of the injection I discussed the importance of continued quad strengthening exercises on a daily basis. I will see the patient back if the symptoms should fail to improve or worsen.    - Patient had questions regarding the possibility of knee replacement surgery she had been scheduled to see Dr. Fajardo for this appointment to discuss surgery.  She has been on Ozempic to help reduce her weight, she has not had for the last 2 months and her weight has gone back up.  Today her BMI is 49, last time I saw her her BMI was 47.  She is well aware that she needs to be below 40 in order to be able to have the surgery.  She just got her Ozempic shot back and is hoping this will help reduce her weight.  We also discussed physical therapy and the possibility of using aquatic therapy.  She states that the [facility] has a walking pool that she would be able to use, she will call back with the details of what she would need to be able to get into that facility.  When she has these details we can happily write her a note to be able to use this pool for some PT and exercise.  I also discussed with her that we can repeat the injections every 3 months until she gets her BMI down to be a surgical candidate.  If Dr. Fajardo has not returned from extended leave at that point we can also refer her to Dr. Kruger for the possibility of knee replacement. We will see her back in 3 months for repeat injections and to see what she verbalized understanding is in agreement with this plan.    Lalita Germain, APRN  6/17/2024    Dictated Utilizing Dragon Dictation

## 2024-07-08 DIAGNOSIS — I10 ESSENTIAL HYPERTENSION: Chronic | ICD-10-CM

## 2024-07-08 RX ORDER — AMLODIPINE BESYLATE 10 MG/1
10 TABLET ORAL
Qty: 30 TABLET | Refills: 0 | Status: SHIPPED | OUTPATIENT
Start: 2024-07-08

## 2024-07-08 RX ORDER — VALSARTAN AND HYDROCHLOROTHIAZIDE 160; 12.5 MG/1; MG/1
1 TABLET, FILM COATED ORAL DAILY
Qty: 30 TABLET | Refills: 0 | Status: SHIPPED | OUTPATIENT
Start: 2024-07-08

## 2024-07-15 DIAGNOSIS — E11.9 TYPE 2 DIABETES MELLITUS WITHOUT COMPLICATION, WITHOUT LONG-TERM CURRENT USE OF INSULIN: Chronic | ICD-10-CM

## 2024-07-15 NOTE — TELEPHONE ENCOUNTER
Caller: Marina Hugo    Relationship: Self    Best call back number: [phone]     Requested Prescriptions:   Requested Prescriptions     Pending Prescriptions Disp Refills    Semaglutide, 1 MG/DOSE, (OZEMPIC) 4 MG/3ML solution pen-injector 9 mL 1     Sig: Inject 1 mg under the skin into the appropriate area as directed 1 (One) Time Per Week.        Pharmacy where request should be sent: [id number] [address] [place] [address]  AT [facility] [phone] [facility] [phone] FX     Last office visit with prescribing clinician: 2/6/2024   Last telemedicine visit with prescribing clinician: Visit date not found   Next office visit with prescribing clinician: Visit date not found     Additional details provided by patient: 1 WEEK LEFT    Does the patient have less than a 3 day supply:  [] Yes  [x] No    Would you like a call back once the refill request has been completed: [] Yes [x] No    If the office needs to give you a call back, can they leave a voicemail: [] Yes [x] No    Hong Juarez Rep   07/15/24 13:07 EDT

## 2024-07-22 RX ORDER — NYSTATIN AND TRIAMCINOLONE ACETONIDE 100000; 1 [USP'U]/G; MG/G
1 OINTMENT TOPICAL 2 TIMES DAILY
Qty: 30 G | Refills: 1 | Status: SHIPPED | OUTPATIENT
Start: 2024-07-22

## 2024-07-22 NOTE — TELEPHONE ENCOUNTER
PT came into office about nystatin-triamcinolone (MYCOLOG) 388839-4.1 UNIT/GM-% ointment  being re-ordered. PT stating that [facility] off [facility] does not have this RX on file and would like this to be refilled.

## 2024-07-30 DIAGNOSIS — G57.03 PIRIFORMIS SYNDROME OF BOTH SIDES: Chronic | ICD-10-CM

## 2024-07-30 RX ORDER — DICYCLOMINE HYDROCHLORIDE 10 MG/1
10 CAPSULE ORAL 2 TIMES DAILY
Qty: 180 CAPSULE | Refills: 1 | Status: CANCELLED | OUTPATIENT
Start: 2024-07-30

## 2024-07-30 RX ORDER — CELECOXIB 200 MG/1
200 CAPSULE ORAL 2 TIMES DAILY
Qty: 60 CAPSULE | Refills: 0 | OUTPATIENT
Start: 2024-07-30

## 2024-07-30 RX ORDER — FERROUS SULFATE 325(65) MG
1 TABLET ORAL NIGHTLY
Qty: 90 TABLET | Refills: 1 | Status: CANCELLED | OUTPATIENT
Start: 2024-07-30

## 2024-07-30 NOTE — PROGRESS NOTES
Chief Complaint:   Chief Complaint   Patient presents with    Diabetes     Med refill due     Hypertension     [facility]     Hyperlipidemia    Asthma    Arthritis    Irritable Bowel Syndrome    Anemia       Marina Hugo 68 y.o. female who presents today for Medical Management of the below listed issues. She  has a problem list of   Patient Active Problem List   Diagnosis    Hypertension    Asthma    Diabetes mellitus    Hyperlipidemia    Irritable bowel syndrome    Osteoarthritis    Chronic cough    Polyp of colon   .  Since the last visit, She has overall felt well and she just retired 2 weeks ago!  In discussion, when patient had seen Dr. Heredia back in 1 April, she had heard him say that she should stop most all of her diabetic medications except for the injections, which she proceeded to do at that time.  Also stopped the Lipitor, as she thought she heard him say to do so.  However, review of his note does not mention any of this, and she has been off these medicines since that time.  she has been compliant with   Current Outpatient Medications:     amLODIPine (NORVASC) 10 MG tablet, Take 1 tablet by mouth every night at bedtime., Disp: 90 tablet, Rfl: 1    atorvastatin (LIPITOR) 20 MG tablet, Take 1 tablet by mouth Daily., Disp: 90 tablet, Rfl: 1    celecoxib (CeleBREX) 200 MG capsule, Take 1 capsule by mouth 2 (Two) Times a Day., Disp: 180 capsule, Rfl: 1    dicyclomine (BENTYL) 10 MG capsule, Take 1 capsule by mouth 2 (Two) Times a Day., Disp: 180 capsule, Rfl: 1    Dupixent 300 MG/2ML solution prefilled syringe, , Disp: , Rfl:     empagliflozin (Jardiance) 10 MG tablet tablet, Take 1 tablet by mouth every night at bedtime., Disp: 90 tablet, Rfl: 1    ferrous sulfate 325 (65 FE) MG tablet, TAKE 1 TABLET BY MOUTH EVERY NIGHT, Disp: 90 tablet, Rfl: 1    Fluticasone-Umeclidin-Vilant (Trelegy Ellipta) 200-62.5-25 MCG/ACT inhaler, Inhale 1 puff Daily., Disp: 180 each, Rfl: 1    glimepiride (AMARYL) 4  MG tablet, Take 1 tablet by mouth Every Morning Before Breakfast., Disp: 90 tablet, Rfl: 1    hydrocortisone 2.5 % cream, Apply 1 Application topically to the appropriate area as directed 2 (Two) Times a Day., Disp: , Rfl:     Insulin Glargine, 1 Unit Dial, (Toujeo SoloStar) 300 UNIT/ML solution pen-injector injection, Inject 40 Units under the skin into the appropriate area as directed Daily., Disp: 18 mL, Rfl: 1    Insulin Pen Needle (BD Pen Needle Mariel 2nd Gen) 32G X 4 MM misc, Use QD, Disp: 100 each, Rfl: 3    metFORMIN ER (GLUCOPHAGE-XR) 500 MG 24 hr tablet, Take 4 tablets by mouth Every Night., Disp: 360 tablet, Rfl: 1    pioglitazone (ACTOS) 30 MG tablet, Take 1 tablet by mouth every night at bedtime., Disp: 90 tablet, Rfl: 1    Semaglutide, 1 MG/DOSE, (OZEMPIC) 4 MG/3ML solution pen-injector, Inject 1 mg under the skin into the appropriate area as directed 1 (One) Time Per Week., Disp: 9 mL, Rfl: 1    valsartan-hydrochlorothiazide (DIOVAN-HCT) 160-12.5 MG per tablet, Take 1 tablet by mouth Daily., Disp: 90 tablet, Rfl: 1    Acetaminophen (TYLENOL ARTHRITIS PAIN PO), Take  by mouth 4 (Four) Times a Day As Needed., Disp: , Rfl:     albuterol sulfate  (90 Base) MCG/ACT inhaler, Inhale 2 puffs Every 4 (Four) Hours As Needed for Wheezing., Disp: , Rfl:     Azelastine HCl 137 MCG/SPRAY solution, INSTILL 2 SPRAYS IN EACH NOSTRIL TWICE A DAY (Patient not taking: Reported on 6/17/2024), Disp: , Rfl:     azithromycin (Zithromax Z-Ted) 250 MG tablet, Take 2 tablets the first day, then 1 tablet daily for 4 days., Disp: 6 tablet, Rfl: 0    cetirizine (zyrTEC) 10 MG tablet, Take 1 tablet by mouth Every Night., Disp: , Rfl:     levalbuterol (XOPENEX) 0.63 MG/3ML nebulizer solution, Take 1 ampule by nebulization Every 6 (Six) Hours As Needed., Disp: , Rfl:     nystatin-triamcinolone (MYCOLOG) 091655-6.1 UNIT/GM-% ointment, Apply 1 Application topically to the appropriate area as directed 2 (Two) Times a Day., Disp:  "30 g, Rfl: 1.  She denies medication side effects.    All of the other chronic condition(s) listed above are stable w/o issues.    /76   Pulse 76   Temp 98.3 °F (36.8 °C) (Oral)   Resp 16   Ht 160 cm (63\")   Wt 125 kg (275 lb)   SpO2 96%   BMI 48.71 kg/m²     Results for orders placed or performed in visit on 10/18/23   Comprehensive metabolic panel    Specimen: Blood   Result Value Ref Range    Glucose 95 70 - 99 mg/dL    BUN 21 8 - 27 mg/dL    Creatinine 0.86 0.57 - 1.00 mg/dL    EGFR Result 73 >59 mL/min/1.73    BUN/Creatinine Ratio 24 12 - 28    Sodium 141 134 - 144 mmol/L    Potassium 5.1 3.5 - 5.2 mmol/L    Chloride 100 96 - 106 mmol/L    Total CO2 25 20 - 29 mmol/L    Calcium 10.4 (H) 8.7 - 10.3 mg/dL    Total Protein 6.7 6.0 - 8.5 g/dL    Albumin 4.4 3.9 - 4.9 g/dL    Globulin 2.3 1.5 - 4.5 g/dL    A/G Ratio 1.9 1.2 - 2.2    Total Bilirubin 0.5 0.0 - 1.2 mg/dL    Alkaline Phosphatase 99 44 - 121 IU/L    AST (SGOT) 12 0 - 40 IU/L    ALT (SGPT) 12 0 - 32 IU/L   Lipid panel    Specimen: Blood   Result Value Ref Range    Total Cholesterol 159 100 - 199 mg/dL    Triglycerides 82 0 - 149 mg/dL    HDL Cholesterol 70 >39 mg/dL    VLDL Cholesterol J Carlos 15 5 - 40 mg/dL    LDL Chol Calc (NIH) 74 0 - 99 mg/dL   TSH    Specimen: Blood   Result Value Ref Range    TSH 1.680 0.450 - 4.500 uIU/mL   Hemoglobin A1c    Specimen: Blood   Result Value Ref Range    Hemoglobin A1C 6.7 (H) 4.8 - 5.6 %   MicroAlbumin, Urine, Random - Urine, Clean Catch    Specimen: Urine, Clean Catch   Result Value Ref Range    Microalbumin, Urine 8.2 Not Estab. ug/mL   CBC and Differential    Specimen: Blood   Result Value Ref Range    WBC 7.9 3.4 - 10.8 x10E3/uL    RBC 4.75 3.77 - 5.28 x10E6/uL    Hemoglobin 14.9 11.1 - 15.9 g/dL    Hematocrit 45.2 34.0 - 46.6 %    MCV 95 79 - 97 fL    MCH 31.4 26.6 - 33.0 pg    MCHC 33.0 31.5 - 35.7 g/dL    RDW 13.0 11.7 - 15.4 %    Platelets 290 150 - 450 x10E3/uL    Neutrophil Rel % 58 Not Estab. % "    Lymphocyte Rel % 30 Not Estab. %    Monocyte Rel % 7 Not Estab. %    Eosinophil Rel % 3 Not Estab. %    Basophil Rel % 1 Not Estab. %    Neutrophils Absolute 4.7 1.4 - 7.0 x10E3/uL    Lymphocytes Absolute 2.4 0.7 - 3.1 x10E3/uL    Monocytes Absolute 0.5 0.1 - 0.9 x10E3/uL    Eosinophils Absolute 0.2 0.0 - 0.4 x10E3/uL    Basophils Absolute 0.1 0.0 - 0.2 x10E3/uL    Immature Granulocyte Rel % 1 Not Estab. %    Immature Grans Absolute 0.1 0.0 - 0.1 x10E3/uL             The following portions of the patient's history were reviewed and updated as appropriate: allergies, current medications, past family history, past medical history, past social history, past surgical history, and problem list.    Review of Systems   Constitutional:  Negative for activity change, chills and fever.   Respiratory:  Negative for cough.    Cardiovascular:  Negative for chest pain.   Psychiatric/Behavioral:  Negative for dysphoric mood.        Objective             Physical Exam  Vitals and nursing note reviewed.   Constitutional:       General: She is not in acute distress.     Appearance: She is well-developed.   Cardiovascular:      Rate and Rhythm: Normal rate and regular rhythm.   Pulmonary:      Effort: Pulmonary effort is normal.      Breath sounds: Normal breath sounds.   Neurological:      Mental Status: She is alert and oriented to person, place, and time.   Psychiatric:         Behavior: Behavior normal.         Thought Content: Thought content normal.             Diagnoses and all orders for this visit:    1. Type 2 diabetes mellitus without complication, without long-term current use of insulin (Primary)  -     empagliflozin (Jardiance) 10 MG tablet tablet; Take 1 tablet by mouth every night at bedtime.  Dispense: 90 tablet; Refill: 1  -     glimepiride (AMARYL) 4 MG tablet; Take 1 tablet by mouth Every Morning Before Breakfast.  Dispense: 90 tablet; Refill: 1  -     Insulin Glargine, 1 Unit Dial, (Toujeo SoloStar) 300 UNIT/ML  solution pen-injector injection; Inject 40 Units under the skin into the appropriate area as directed Daily.  Dispense: 18 mL; Refill: 1  -     Insulin Pen Needle (BD Pen Needle Mariel 2nd Gen) 32G X 4 MM misc; Use QD  Dispense: 100 each; Refill: 3  -     metFORMIN ER (GLUCOPHAGE-XR) 500 MG 24 hr tablet; Take 4 tablets by mouth Every Night.  Dispense: 360 tablet; Refill: 1  -     pioglitazone (ACTOS) 30 MG tablet; Take 1 tablet by mouth every night at bedtime.  Dispense: 90 tablet; Refill: 1  -     Semaglutide, 1 MG/DOSE, (OZEMPIC) 4 MG/3ML solution pen-injector; Inject 1 mg under the skin into the appropriate area as directed 1 (One) Time Per Week.  Dispense: 9 mL; Refill: 1  -     Hemoglobin A1c  -     Basic Metabolic Panel  -     Lipid Panel    2. Essential hypertension  -     amLODIPine (NORVASC) 10 MG tablet; Take 1 tablet by mouth every night at bedtime.  Dispense: 90 tablet; Refill: 1  -     valsartan-hydrochlorothiazide (DIOVAN-HCT) 160-12.5 MG per tablet; Take 1 tablet by mouth Daily.  Dispense: 90 tablet; Refill: 1  -     Lipid Panel    3. Mixed hyperlipidemia  -     atorvastatin (LIPITOR) 20 MG tablet; Take 1 tablet by mouth Daily.  Dispense: 90 tablet; Refill: 1  -     Lipid Panel    4. Piriformis syndrome of both sides  -     celecoxib (CeleBREX) 200 MG capsule; Take 1 capsule by mouth 2 (Two) Times a Day.  Dispense: 180 capsule; Refill: 1    5. Irritable bowel syndrome without diarrhea    6. Anemia, unspecified type    7. Moderate persistent asthma with acute exacerbation  -     Fluticasone-Umeclidin-Vilant (Trelegy Ellipta) 200-62.5-25 MCG/ACT inhaler; Inhale 1 puff Daily.  Dispense: 180 each; Refill: 1    8. Screening for colon cancer  -     Ambulatory Referral to Gastroenterology

## 2024-07-31 ENCOUNTER — OFFICE VISIT (OUTPATIENT)
Dept: FAMILY MEDICINE CLINIC | Facility: CLINIC | Age: 69
End: 2024-07-31
Payer: COMMERCIAL

## 2024-07-31 VITALS
RESPIRATION RATE: 16 BRPM | HEART RATE: 76 BPM | DIASTOLIC BLOOD PRESSURE: 76 MMHG | BODY MASS INDEX: 48.73 KG/M2 | OXYGEN SATURATION: 96 % | SYSTOLIC BLOOD PRESSURE: 118 MMHG | TEMPERATURE: 98.3 F | HEIGHT: 63 IN | WEIGHT: 275 LBS

## 2024-07-31 DIAGNOSIS — D64.9 ANEMIA, UNSPECIFIED TYPE: Chronic | ICD-10-CM

## 2024-07-31 DIAGNOSIS — Z12.11 SCREENING FOR COLON CANCER: ICD-10-CM

## 2024-07-31 DIAGNOSIS — E78.2 MIXED HYPERLIPIDEMIA: Chronic | ICD-10-CM

## 2024-07-31 DIAGNOSIS — G57.03 PIRIFORMIS SYNDROME OF BOTH SIDES: Chronic | ICD-10-CM

## 2024-07-31 DIAGNOSIS — I10 ESSENTIAL HYPERTENSION: Chronic | ICD-10-CM

## 2024-07-31 DIAGNOSIS — E11.9 TYPE 2 DIABETES MELLITUS WITHOUT COMPLICATION, WITHOUT LONG-TERM CURRENT USE OF INSULIN: Primary | Chronic | ICD-10-CM

## 2024-07-31 DIAGNOSIS — K58.9 IRRITABLE BOWEL SYNDROME WITHOUT DIARRHEA: Chronic | ICD-10-CM

## 2024-07-31 DIAGNOSIS — J45.41 MODERATE PERSISTENT ASTHMA WITH ACUTE EXACERBATION: Chronic | ICD-10-CM

## 2024-07-31 PROCEDURE — 99214 OFFICE O/P EST MOD 30 MIN: CPT | Performed by: FAMILY MEDICINE

## 2024-07-31 RX ORDER — ATORVASTATIN CALCIUM 20 MG/1
20 TABLET, FILM COATED ORAL DAILY
Qty: 90 TABLET | Refills: 1 | Status: SHIPPED | OUTPATIENT
Start: 2024-07-31

## 2024-07-31 RX ORDER — GLIMEPIRIDE 4 MG/1
4 TABLET ORAL
Qty: 90 TABLET | Refills: 1 | Status: SHIPPED | OUTPATIENT
Start: 2024-07-31

## 2024-07-31 RX ORDER — METFORMIN HYDROCHLORIDE 500 MG/1
2000 TABLET, EXTENDED RELEASE ORAL NIGHTLY
Qty: 360 TABLET | Refills: 1 | Status: SHIPPED | OUTPATIENT
Start: 2024-07-31

## 2024-07-31 RX ORDER — AMLODIPINE BESYLATE 10 MG/1
10 TABLET ORAL
Qty: 90 TABLET | Refills: 1 | Status: SHIPPED | OUTPATIENT
Start: 2024-07-31

## 2024-07-31 RX ORDER — PEN NEEDLE, DIABETIC 32GX 5/32"
NEEDLE, DISPOSABLE MISCELLANEOUS
Qty: 100 EACH | Refills: 3 | Status: SHIPPED | OUTPATIENT
Start: 2024-07-31

## 2024-07-31 RX ORDER — VALSARTAN AND HYDROCHLOROTHIAZIDE 160; 12.5 MG/1; MG/1
1 TABLET, FILM COATED ORAL DAILY
Qty: 90 TABLET | Refills: 1 | Status: SHIPPED | OUTPATIENT
Start: 2024-07-31

## 2024-07-31 RX ORDER — DUPILUMAB 300 MG/2ML
INJECTION, SOLUTION SUBCUTANEOUS
COMMUNITY
Start: 2024-06-08

## 2024-07-31 RX ORDER — CELECOXIB 200 MG/1
200 CAPSULE ORAL 2 TIMES DAILY
Qty: 180 CAPSULE | Refills: 1 | Status: SHIPPED | OUTPATIENT
Start: 2024-07-31

## 2024-07-31 RX ORDER — INSULIN GLARGINE 300 U/ML
40 INJECTION, SOLUTION SUBCUTANEOUS DAILY
Qty: 18 ML | Refills: 1 | Status: SHIPPED | OUTPATIENT
Start: 2024-07-31

## 2024-07-31 RX ORDER — FLUTICASONE FUROATE, UMECLIDINIUM BROMIDE AND VILANTEROL TRIFENATATE 200; 62.5; 25 UG/1; UG/1; UG/1
1 POWDER RESPIRATORY (INHALATION) DAILY
Qty: 180 EACH | Refills: 1 | Status: SHIPPED | OUTPATIENT
Start: 2024-07-31

## 2024-07-31 RX ORDER — PIOGLITAZONEHYDROCHLORIDE 30 MG/1
30 TABLET ORAL
Qty: 90 TABLET | Refills: 1 | Status: SHIPPED | OUTPATIENT
Start: 2024-07-31

## 2024-08-01 ENCOUNTER — TELEPHONE (OUTPATIENT)
Dept: FAMILY MEDICINE CLINIC | Facility: CLINIC | Age: 69
End: 2024-08-01
Payer: COMMERCIAL

## 2024-08-01 DIAGNOSIS — E11.9 TYPE 2 DIABETES MELLITUS WITHOUT COMPLICATION, WITHOUT LONG-TERM CURRENT USE OF INSULIN: Primary | ICD-10-CM

## 2024-08-01 LAB
BUN SERPL-MCNC: 23 MG/DL (ref 8–27)
BUN/CREAT SERPL: 29 (ref 12–28)
CALCIUM SERPL-MCNC: 10 MG/DL (ref 8.7–10.3)
CHLORIDE SERPL-SCNC: 102 MMOL/L (ref 96–106)
CHOLEST SERPL-MCNC: 254 MG/DL (ref 100–199)
CO2 SERPL-SCNC: 22 MMOL/L (ref 20–29)
CREAT SERPL-MCNC: 0.8 MG/DL (ref 0.57–1)
EGFRCR SERPLBLD CKD-EPI 2021: 79 ML/MIN/1.73
GLUCOSE SERPL-MCNC: 186 MG/DL (ref 70–99)
HBA1C MFR BLD: 11.3 % (ref 4.8–5.6)
HDLC SERPL-MCNC: 71 MG/DL
LDLC SERPL CALC-MCNC: 156 MG/DL (ref 0–99)
POTASSIUM SERPL-SCNC: 4.3 MMOL/L (ref 3.5–5.2)
SODIUM SERPL-SCNC: 140 MMOL/L (ref 134–144)
TRIGL SERPL-MCNC: 155 MG/DL (ref 0–149)
VLDLC SERPL CALC-MCNC: 27 MG/DL (ref 5–40)

## 2024-08-01 NOTE — TELEPHONE ENCOUNTER
I TALKED TO PATIENT SHE WAS TOLD -Okay, I remember her telling me that now.  Okay, cancel the referral to Endo, and make sure she restarts all the meds that I sent her in on, and she will need to come back this time in 3 months with labs prior.  I have placed that order for her to complete 1 week prior to seeing me.  PT UNDERSTANDS DIRECTIONS AND HAS NO FURTHER QUESTIONS AT THIS TIME

## 2024-08-01 NOTE — TELEPHONE ENCOUNTER
PT STATES  HAS NOT TAKING ANY MEDICATIONS FOR DIABETES . PT STATED DR MCKEON ONLY TOLD HER TO TAKE BP MEDS ONLY PT WAS ON TOO MANY MEDS.  PT STATES WOULD LIKE TO GO BACK ON DIABETIC MEDS BEFORE BEING REFERRED TO SPECIALIST.  DR ALLEN ADVISED        ----- Message from Dwayne Allen sent at 8/1/2024  1:01 PM EDT -----  Patient's diabetes is wildly out of control.  I placed a referral to see the [facility] urgently.  Please make her aware.

## 2024-08-03 DIAGNOSIS — D64.9 ANEMIA, UNSPECIFIED TYPE: Chronic | ICD-10-CM

## 2024-08-04 DIAGNOSIS — I10 ESSENTIAL HYPERTENSION: Chronic | ICD-10-CM

## 2024-08-04 RX ORDER — AMLODIPINE BESYLATE 10 MG/1
10 TABLET ORAL
Qty: 30 TABLET | OUTPATIENT
Start: 2024-08-04

## 2024-08-04 RX ORDER — VALSARTAN AND HYDROCHLOROTHIAZIDE 160; 12.5 MG/1; MG/1
1 TABLET, FILM COATED ORAL DAILY
Qty: 30 TABLET | OUTPATIENT
Start: 2024-08-04

## 2024-08-04 RX ORDER — FERROUS SULFATE 325(65) MG
1 TABLET ORAL
Qty: 90 TABLET | Refills: 1 | Status: SHIPPED | OUTPATIENT
Start: 2024-08-04

## 2024-10-15 ENCOUNTER — TELEPHONE (OUTPATIENT)
Dept: FAMILY MEDICINE CLINIC | Facility: CLINIC | Age: 69
End: 2024-10-15

## 2024-10-15 NOTE — TELEPHONE ENCOUNTER
Caller: Marina Hugo    Relationship: Self    Best call back number: [phone]     Who are you requesting to speak with (clinical staff, provider,  specific staff member): CLINICAL    What was the call regarding: PATIENT WOULD LIKE TO KNOW WHAT PREVENTATIVE IMMUNIZATIONS WOULD BE RECOMMENDED FOR HER AND TO SCHEDULE THEM IF POSSIBLE.

## 2024-10-15 NOTE — TELEPHONE ENCOUNTER
I talked to patient pt was told -  It is generally recommended that she get her yearly flu shot.

## 2024-10-29 DIAGNOSIS — E11.9 TYPE 2 DIABETES MELLITUS WITHOUT COMPLICATION, WITHOUT LONG-TERM CURRENT USE OF INSULIN: Chronic | ICD-10-CM

## 2024-10-29 RX ORDER — PEN NEEDLE, DIABETIC 32GX 5/32"
NEEDLE, DISPOSABLE MISCELLANEOUS
Qty: 100 EACH | Refills: 3 | Status: SHIPPED | OUTPATIENT
Start: 2024-10-29

## 2024-10-29 NOTE — TELEPHONE ENCOUNTER
Insurance wont pay for needles any longer.  Would like them sent to [facility], they are much cheaper.

## 2024-11-11 ENCOUNTER — CLINICAL SUPPORT (OUTPATIENT)
Dept: FAMILY MEDICINE CLINIC | Facility: CLINIC | Age: 69
End: 2024-11-11
Payer: MEDICARE

## 2024-11-11 DIAGNOSIS — Z28.39 IMMUNIZATIONS INCOMPLETE: Primary | ICD-10-CM

## 2024-11-11 PROCEDURE — G0008 ADMIN INFLUENZA VIRUS VAC: HCPCS | Performed by: INTERNAL MEDICINE

## 2024-11-11 PROCEDURE — 90662 IIV NO PRSV INCREASED AG IM: CPT | Performed by: INTERNAL MEDICINE

## 2024-11-11 PROCEDURE — 90480 ADMN SARSCOV2 VAC 1/ONLY CMP: CPT | Performed by: INTERNAL MEDICINE

## 2024-11-11 PROCEDURE — 91320 SARSCV2 VAC 30MCG TRS-SUC IM: CPT | Performed by: INTERNAL MEDICINE

## 2024-12-05 ENCOUNTER — OFFICE VISIT (OUTPATIENT)
Dept: FAMILY MEDICINE CLINIC | Facility: CLINIC | Age: 69
End: 2024-12-05
Payer: MEDICARE

## 2024-12-05 VITALS
DIASTOLIC BLOOD PRESSURE: 80 MMHG | TEMPERATURE: 98 F | OXYGEN SATURATION: 94 % | HEART RATE: 86 BPM | SYSTOLIC BLOOD PRESSURE: 118 MMHG | HEIGHT: 63 IN | BODY MASS INDEX: 48.73 KG/M2 | WEIGHT: 275 LBS

## 2024-12-05 DIAGNOSIS — I10 PRIMARY HYPERTENSION: Primary | ICD-10-CM

## 2024-12-05 DIAGNOSIS — K58.9 IRRITABLE BOWEL SYNDROME WITHOUT DIARRHEA: ICD-10-CM

## 2024-12-05 DIAGNOSIS — E11.65 TYPE 2 DIABETES MELLITUS WITH HYPERGLYCEMIA, WITHOUT LONG-TERM CURRENT USE OF INSULIN: ICD-10-CM

## 2024-12-05 DIAGNOSIS — E11.9 TYPE 2 DIABETES MELLITUS WITHOUT COMPLICATION, WITHOUT LONG-TERM CURRENT USE OF INSULIN: Chronic | ICD-10-CM

## 2024-12-05 DIAGNOSIS — Z23 IMMUNIZATION DUE: ICD-10-CM

## 2024-12-05 DIAGNOSIS — E78.2 MIXED HYPERLIPIDEMIA: ICD-10-CM

## 2024-12-05 DIAGNOSIS — M17.0 PRIMARY OSTEOARTHRITIS OF BOTH KNEES: ICD-10-CM

## 2024-12-05 DIAGNOSIS — J40 BRONCHITIS: ICD-10-CM

## 2024-12-05 PROBLEM — Z00.00 MEDICARE ANNUAL WELLNESS VISIT, SUBSEQUENT: Status: ACTIVE | Noted: 2024-12-05

## 2024-12-05 PROCEDURE — G0439 PPPS, SUBSEQ VISIT: HCPCS | Performed by: INTERNAL MEDICINE

## 2024-12-05 PROCEDURE — 3074F SYST BP LT 130 MM HG: CPT | Performed by: INTERNAL MEDICINE

## 2024-12-05 PROCEDURE — 3046F HEMOGLOBIN A1C LEVEL >9.0%: CPT | Performed by: INTERNAL MEDICINE

## 2024-12-05 PROCEDURE — 1126F AMNT PAIN NOTED NONE PRSNT: CPT | Performed by: INTERNAL MEDICINE

## 2024-12-05 PROCEDURE — 3079F DIAST BP 80-89 MM HG: CPT | Performed by: INTERNAL MEDICINE

## 2024-12-05 PROCEDURE — 90677 PCV20 VACCINE IM: CPT | Performed by: INTERNAL MEDICINE

## 2024-12-05 PROCEDURE — 1170F FXNL STATUS ASSESSED: CPT | Performed by: INTERNAL MEDICINE

## 2024-12-05 PROCEDURE — G0009 ADMIN PNEUMOCOCCAL VACCINE: HCPCS | Performed by: INTERNAL MEDICINE

## 2024-12-05 PROCEDURE — 1160F RVW MEDS BY RX/DR IN RCRD: CPT | Performed by: INTERNAL MEDICINE

## 2024-12-05 PROCEDURE — 1159F MED LIST DOCD IN RCRD: CPT | Performed by: INTERNAL MEDICINE

## 2024-12-05 RX ORDER — BENZONATATE 200 MG/1
200 CAPSULE ORAL 3 TIMES DAILY PRN
Qty: 30 CAPSULE | Refills: 11 | Status: SHIPPED | OUTPATIENT
Start: 2024-12-05

## 2024-12-05 NOTE — PROGRESS NOTES
Subjective   The ABCs of the Annual Wellness Visit  Medicare Wellness Visit      aMrina Hugo is a 69 y.o. patient who presents for a Medicare Wellness Visit.  69-year-old female with history of hypertension, hyperlipidemia, type 2 diabetes, asthma, irritable bowel syndrome, history of colon polyp, osteoarthritis, and obesity.      Here for follow-up of diabetes.  Patient states to have been compliant with medications.  Blood sugar monitoring - patient states has not been following.  No episodes of hypoglycemia, nausea, vomiting, new rashes, syncope or other issues.  Denies any difficulties with the current medication regimen of Toujeo insulin of 40 U daily, metformin  mg 4 tablets daily, pioglitazone 30 mg daily, Jardiance 10 mg daily, glimepiride 4 mg daily, and semaglutide 1 mg weekly. Has been back on all meds for the last 6 weeks. Last A1c on 7/31/2024 of 11.3%(had not taken any of the diabetic meds for 4 months prior) and 3/20/2024 of 6.7%.  Last microalbumin on 3/20/2024 was normal.      Follow-up for hypertension.  Currently, has been feeling well and asymptomatic without any headaches, vision changes, cough, chest pain, shortness of breath, swelling, focal neurologic deficit, memory loss or syncope.  Has been taking the medications regularly and adherent with the regimen of amlodipine 10 mg daily and valsartan-HCTZ 160/12.5 daily.  Denies medication side effects and no significant interval events.      Follow-up for cholesterol.  Currently, has been feeling well without any myalgias, muscle aches, weakness, numbness, chest pain, short of breath or other issues.  Currently, is adherent with medication regimen of atorvastatin 20 mg and denies medication side effects. Last lipid panel performed on 7/31/2024 with total cholesterol 254, triglycerides 155, HDL 71, and .  Is due for lab follow-up.    The following portions of the patient's history were reviewed and updated as appropriate: allergies,  current medications, past family history, past medical history, past social history, past surgical history, and problem list.  Compared to one year ago, the patient's physical health is the same.  Compared to one year ago, the patient's mental health is the same.    Recent Hospitalizations:  She was not admitted to the hospital during the last year.     Current Medical Providers:  Patient Care Team:  Efraín Rebollar MD as PCP - General (Internal Medicine)  Dwayne Allen MD as Referring Physician (Family Medicine)  Jaya Peralta MD (Gastroenterology)  Haley Galarza MD (Obstetrics and Gynecology)  Eric Fisher MD as Consulting Physician (Ophthalmology)  Raj Walker MD as Consulting Physician (Orthopedic Surgery)  Xochitl Ye MD (Dermatology)  Teodoro Heredia MD as Consulting Physician (Allergy and Immunology)    Outpatient Medications Prior to Visit   Medication Sig Dispense Refill    Acetaminophen (TYLENOL ARTHRITIS PAIN PO) Take  by mouth 4 (Four) Times a Day As Needed.      albuterol sulfate  (90 Base) MCG/ACT inhaler Inhale 2 puffs Every 4 (Four) Hours As Needed for Wheezing.      amLODIPine (NORVASC) 10 MG tablet Take 1 tablet by mouth every night at bedtime. 90 tablet 1    atorvastatin (LIPITOR) 20 MG tablet Take 1 tablet by mouth Daily. 90 tablet 1    celecoxib (CeleBREX) 200 MG capsule Take 1 capsule by mouth 2 (Two) Times a Day. 180 capsule 1    cetirizine (zyrTEC) 10 MG tablet Take 1 tablet by mouth Every Night.      Dupixent 300 MG/2ML solution prefilled syringe       empagliflozin (Jardiance) 10 MG tablet tablet Take 1 tablet by mouth every night at bedtime. 90 tablet 1    ferrous sulfate 325 (65 FE) MG tablet TAKE 1 TABLET BY MOUTH EVERY DAY AT NIGHT 90 tablet 1    Fluticasone-Umeclidin-Vilant (Trelegy Ellipta) 200-62.5-25 MCG/ACT inhaler Inhale 1 puff Daily. 180 each 1    glimepiride (AMARYL) 4 MG tablet Take 1 tablet by mouth Every Morning Before  Breakfast. 90 tablet 1    hydrocortisone 2.5 % cream Apply 1 Application topically to the appropriate area as directed 2 (Two) Times a Day.      Insulin Glargine, 1 Unit Dial, (Toujeo SoloStar) 300 UNIT/ML solution pen-injector injection Inject 40 Units under the skin into the appropriate area as directed Daily. 18 mL 1    Insulin Pen Needle (BD Pen Needle Mariel 2nd Gen) 32G X 4 MM misc Use  each 3    levalbuterol (XOPENEX) 0.63 MG/3ML nebulizer solution Take 1 ampule by nebulization Every 6 (Six) Hours As Needed.      metFORMIN ER (GLUCOPHAGE-XR) 500 MG 24 hr tablet Take 4 tablets by mouth Every Night. 360 tablet 1    pioglitazone (ACTOS) 30 MG tablet Take 1 tablet by mouth every night at bedtime. 90 tablet 1    valsartan-hydrochlorothiazide (DIOVAN-HCT) 160-12.5 MG per tablet Take 1 tablet by mouth Daily. 90 tablet 1    Semaglutide, 1 MG/DOSE, (OZEMPIC) 4 MG/3ML solution pen-injector Inject 1 mg under the skin into the appropriate area as directed 1 (One) Time Per Week. 9 mL 1    Azelastine HCl 137 MCG/SPRAY solution INSTILL 2 SPRAYS IN EACH NOSTRIL TWICE A DAY (Patient not taking: Reported on 6/17/2024)      nystatin-triamcinolone (MYCOLOG) 143672-9.1 UNIT/GM-% ointment Apply 1 Application topically to the appropriate area as directed 2 (Two) Times a Day. 30 g 1    azithromycin (Zithromax Z-Ted) 250 MG tablet Take 2 tablets the first day, then 1 tablet daily for 4 days. 6 tablet 0    dicyclomine (BENTYL) 10 MG capsule Take 1 capsule by mouth 2 (Two) Times a Day. 180 capsule 1     No facility-administered medications prior to visit.     No opioid medication identified on active medication list. I have reviewed chart for other potential  high risk medication/s and harmful drug interactions in the elderly.      Aspirin is not on active medication list.  Aspirin use is not indicated based on review of current medical condition/s. Risk of harm outweighs potential benefits.  .    Patient Active Problem List  "  Diagnosis    Hypertension    Asthma    Diabetes mellitus    Hyperlipidemia    Irritable bowel syndrome    Osteoarthritis    Chronic cough    Polyp of colon    Osteoarthritis of both knees    Medicare annual wellness visit, subsequent    Body mass index (BMI) of 45.0 to 49.9 in adult     Advance Care Planning Advance Directive is not on file.  ACP discussion was held with the patient during this visit. Patient has an advance directive (not in EMR), copy requested.    Review of Systems   Constitutional:  Negative for fatigue and fever.   HENT:  Negative for drooling, postnasal drip, rhinorrhea and voice change.    Eyes:  Negative for visual disturbance.   Respiratory:  Negative for cough, chest tightness, shortness of breath and wheezing.    Cardiovascular:  Negative for chest pain and palpitations.   Gastrointestinal:  Negative for abdominal pain, constipation, diarrhea, nausea and vomiting.   Endocrine: Negative for polyuria.   Genitourinary:  Negative for dysuria.   Musculoskeletal:  Positive for arthralgias. Negative for back pain.   Neurological:  Negative for dizziness, weakness, light-headedness and numbness.   Hematological:  Does not bruise/bleed easily.   Psychiatric/Behavioral:  Negative for behavioral problems. The patient is not nervous/anxious.         Objective   Vitals:    12/05/24 1531   BP: 118/80   BP Location: Left arm   Patient Position: Sitting   Cuff Size: Large Adult   Pulse: 86   Temp: 98 °F (36.7 °C)   TempSrc: Temporal   SpO2: 94%   Weight: 125 kg (275 lb)   Height: 160 cm (63\")   PainSc: 0-No pain       Estimated body mass index is 48.71 kg/m² as calculated from the following:    Height as of this encounter: 160 cm (63\").    Weight as of this encounter: 125 kg (275 lb).  Physical Exam  Vitals and nursing note reviewed.   Constitutional:       General: She is not in acute distress.     Appearance: Normal appearance. She is well-developed. She is obese. She is not ill-appearing, " toxic-appearing or diaphoretic.   HENT:      Head: Normocephalic and atraumatic.      Right Ear: External ear normal.      Left Ear: External ear normal.      Nose: Nose normal.   Eyes:      Conjunctiva/sclera: Conjunctivae normal.      Pupils: Pupils are equal, round, and reactive to light.   Neck:      Thyroid: No thyromegaly.      Trachea: No tracheal deviation.   Cardiovascular:      Rate and Rhythm: Normal rate and regular rhythm.      Heart sounds: Normal heart sounds. No murmur heard.     No friction rub. No gallop.   Pulmonary:      Effort: Pulmonary effort is normal. No respiratory distress.      Breath sounds: Normal breath sounds.   Abdominal:      General: Bowel sounds are normal.      Palpations: Abdomen is soft. There is no mass.      Tenderness: There is no abdominal tenderness. There is no guarding.   Musculoskeletal:         General: Normal range of motion.      Cervical back: Normal range of motion and neck supple.   Lymphadenopathy:      Cervical: No cervical adenopathy.   Skin:     General: Skin is warm and dry.      Capillary Refill: Capillary refill takes less than 2 seconds.      Findings: No rash.   Neurological:      Mental Status: She is alert and oriented to person, place, and time.      Motor: No abnormal muscle tone.      Deep Tendon Reflexes: Reflexes normal.   Psychiatric:         Behavior: Behavior normal.         Thought Content: Thought content normal.         Judgment: Judgment normal.        Does the patient have evidence of cognitive impairment? No, mini cog 5 out of 5 score                                                                                           Health  Risk Assessment    Smoking Status:  Social History     Tobacco Use   Smoking Status Never    Passive exposure: Never   Smokeless Tobacco Never     Alcohol Consumption:  Social History     Substance and Sexual Activity   Alcohol Use No    Comment: Caffeine use: 2-3 cups daily       Fall Risk Screen  STEADI Fall  Risk Assessment was completed, and patient is at LOW risk for falls.Assessment completed on:2024    Depression Screening   Little interest or pleasure in doing things? Not at all   Feeling down, depressed, or hopeless? Not at all   PHQ-2 Total Score 0      Health Habits and Functional and Cognitive Screenin/5/2024     3:39 PM   Functional & Cognitive Status   Do you have difficulty preparing food and eating? No   Do you have difficulty bathing yourself, getting dressed or grooming yourself? No   Do you have difficulty using the toilet? No   Do you have difficulty moving around from place to place? No   Do you have trouble with steps or getting out of a bed or a chair? No   Current Diet Well Balanced Diet   Dental Exam Up to date   Eye Exam Up to date   Exercise (times per week) 0 times per week   Current Exercises Include No Regular Exercise   Do you need help using the phone?  No   Are you deaf or do you have serious difficulty hearing?  No   Do you need help to go to places out of walking distance? No   Do you need help shopping? No   Do you need help preparing meals?  No   Do you need help with housework?  No   Do you need help with laundry? No   Do you need help taking your medications? No   Do you need help managing money? No   Do you ever drive or ride in a car without wearing a seat belt? No   Have you felt unusual stress, anger or loneliness in the last month? No   Who do you live with? Spouse   If you need help, do you have trouble finding someone available to you? No   Have you been bothered in the last four weeks by sexual problems? No   Do you have difficulty concentrating, remembering or making decisions? No           Age-appropriate Screening Schedule:  Refer to the list below for future screening recommendations based on patient's age, sex and/or medical conditions. Orders for these recommended tests are listed in the plan section. The patient has been provided with a written  plan.    Health Maintenance List  Health Maintenance   Topic Date Due    DXA SCAN  Never done    COLORECTAL CANCER SCREENING  12/18/2020    PAP SMEAR  06/20/2021    ZOSTER VACCINE (3 of 3) 07/07/2022    HEMOGLOBIN A1C  01/31/2025    LIPID PANEL  07/31/2025    DIABETIC EYE EXAM  08/20/2025    ANNUAL WELLNESS VISIT  12/05/2025    DIABETIC FOOT EXAM  12/05/2025    BMI FOLLOWUP  12/05/2025    MAMMOGRAM  07/30/2026    TDAP/TD VACCINES (3 - Td or Tdap) 04/04/2032    COVID-19 Vaccine  Completed    INFLUENZA VACCINE  Completed    Pneumococcal Vaccine 65+  Completed    HEPATITIS C SCREENING  Discontinued    URINE MICROALBUMIN  Discontinued                                                                                                                                                CMS Preventative Services Quick Reference  Risk Factors Identified During Encounter  Immunizations Discussed/Encouraged: Prevnar 20 (Pneumococcal 20-valent conjugate), Shingrix, and RSV (Respiratory Syncytial Virus)  Diagnoses and all orders for this visit:    1. Primary hypertension (Primary)    2. Mixed hyperlipidemia    3. Type 2 diabetes mellitus with hyperglycemia, without long-term current use of insulin  -     Semaglutide, 2 MG/DOSE, (OZEMPIC) 8 MG/3ML solution pen-injector; Inject 2 mg under the skin into the appropriate area as directed 1 (One) Time Per Week.  Dispense: 9 mL; Refill: 1    4. Irritable bowel syndrome without diarrhea    5. Primary osteoarthritis of both knees    6. Body mass index (BMI) of 45.0 to 49.9 in adult    7. Type 2 diabetes mellitus without complication, without long-term current use of insulin    8. Immunization due  -     Pneumococcal Conjugate Vaccine 20-Valent All    9. Bronchitis  -     benzonatate (TESSALON) 200 MG capsule; Take 1 capsule by mouth 3 (Three) Times a Day As Needed for Cough.  Dispense: 30 capsule; Refill: 11      Reviewed history and annual wellness visit with patient during office time.   Medications reviewed as appropriate.  Discussed advanced directives and living will.  Patient has living will: Living will: yes and patient will bring copy to office.  Discussed fall risk and precautions encourage removing throw rugs and using grab bars within the home and bathroom.  Will check the labs as ordered above to evaluate the blood sugars, kidney, liver, cholesterol for screening.  Discussed flu shot recommended to get the high-dose influenza vaccine annually in the fall.  The patient has a COVID HM Topic on their chart, and they are fully vaccinated..  Tdap, covid, influenza and pneumovax-23 up to date and appropriate.  RSV, prevnar 20, Shingrix #2 vaccination series recommended.  Encourage follow-up with the eye doctor on annual basis for glaucoma evaluation.  Discussed weight and encouraged exercise as tolerated while following a healthy diet.  Colon cancer screening discussed and current status: colonoscopy is due for colonoscopy and is already completing papers for scheduling.  Recommend to get annual mammograms. Last mammogram performed on 7/30/2024 with only scattered fibroglandular densities but no suspicious masses.  Repeat in 1 year recommended.  Follow-up with gynecology and specialists as scheduled.      Will increase the ozempic to 2 mg weekly.  This should help with her diabetes as well as her weight.  Encouraged weight loss due to her arthritis and likely need for future surgery for knee replacements.  Diabetes control is strongly recommend including follow the diet which is discussed in the office and try and increase exercise as tolerated.  Prevnar 20 was given today and she will get the RSV and Shingrix No. 2 at the pharmacy.  She will be scheduling her colonoscopy and her mammogram.      The above risks/problems have been discussed with the patient.  Pertinent information has been shared with the patient in the After Visit Summary.  An After Visit Summary and PPPS were made available to  the patient.    Follow Up:   Next Medicare Wellness visit to be scheduled in 1 year.

## 2024-12-12 ENCOUNTER — TELEPHONE (OUTPATIENT)
Dept: FAMILY MEDICINE CLINIC | Facility: CLINIC | Age: 69
End: 2024-12-12
Payer: MEDICARE

## 2024-12-12 DIAGNOSIS — J45.41 MODERATE PERSISTENT ASTHMA WITH ACUTE EXACERBATION: Primary | ICD-10-CM

## 2024-12-12 DIAGNOSIS — I10 ESSENTIAL HYPERTENSION: Chronic | ICD-10-CM

## 2024-12-12 NOTE — TELEPHONE ENCOUNTER
Caller: Marina Hugo    Relationship: Self    Best call back number: [phone]    What medication are you requesting: DICYCLOMINE 10 MG PRN    PRO AIR INHALER TAKE ONE PUFF EVERY SIX HOURS AS NEEDED PRN    Have you had these symptoms before:    [x] Yes  [] No    Have you been treated for these symptoms before:   [x] Yes  [] No    If a prescription is needed, what is your preferred pharmacy and phone number: [facility] - [address] - [phone]  - [phone] FX     Additional notes: ONE IS FOR IBS THE OTHER IS A RESCUE INHALER.    PATIENT REQUESTING TO HAVE NEW SCRIPTS SENT TO [facility]

## 2024-12-13 NOTE — TELEPHONE ENCOUNTER
LOV 12/5/24 NOV None  LF unknown provider-albuterol and dicyclomine not on med list    Protocol  Please advise    C A

## 2024-12-16 DIAGNOSIS — K58.9 IRRITABLE BOWEL SYNDROME WITHOUT DIARRHEA: ICD-10-CM

## 2024-12-16 RX ORDER — VALSARTAN AND HYDROCHLOROTHIAZIDE 160; 12.5 MG/1; MG/1
1 TABLET, FILM COATED ORAL DAILY
Qty: 90 TABLET | Refills: 1 | Status: SHIPPED | OUTPATIENT
Start: 2024-12-16

## 2024-12-16 RX ORDER — AMLODIPINE BESYLATE 10 MG/1
10 TABLET ORAL
Qty: 90 TABLET | Refills: 1 | Status: SHIPPED | OUTPATIENT
Start: 2024-12-16

## 2024-12-16 RX ORDER — ALBUTEROL SULFATE 90 UG/1
2 INHALANT RESPIRATORY (INHALATION) EVERY 4 HOURS PRN
Qty: 18 G | Refills: 3 | Status: SHIPPED | OUTPATIENT
Start: 2024-12-16

## 2024-12-16 RX ORDER — DICYCLOMINE HYDROCHLORIDE 10 MG/1
10 CAPSULE ORAL AS NEEDED
Qty: 60 CAPSULE | Refills: 5 | Status: SHIPPED | OUTPATIENT
Start: 2024-12-16

## 2025-01-02 ENCOUNTER — OFFICE VISIT (OUTPATIENT)
Dept: FAMILY MEDICINE CLINIC | Facility: CLINIC | Age: 70
End: 2025-01-02
Payer: MEDICARE

## 2025-01-02 VITALS
HEART RATE: 74 BPM | TEMPERATURE: 98.6 F | SYSTOLIC BLOOD PRESSURE: 140 MMHG | BODY MASS INDEX: 49.86 KG/M2 | WEIGHT: 281.4 LBS | HEIGHT: 63 IN | OXYGEN SATURATION: 92 % | DIASTOLIC BLOOD PRESSURE: 80 MMHG

## 2025-01-02 DIAGNOSIS — M54.50 ACUTE BILATERAL LOW BACK PAIN WITHOUT SCIATICA: Primary | ICD-10-CM

## 2025-01-02 PROCEDURE — 3079F DIAST BP 80-89 MM HG: CPT | Performed by: NURSE PRACTITIONER

## 2025-01-02 PROCEDURE — 3077F SYST BP >= 140 MM HG: CPT | Performed by: NURSE PRACTITIONER

## 2025-01-02 PROCEDURE — 1160F RVW MEDS BY RX/DR IN RCRD: CPT | Performed by: NURSE PRACTITIONER

## 2025-01-02 PROCEDURE — 99213 OFFICE O/P EST LOW 20 MIN: CPT | Performed by: NURSE PRACTITIONER

## 2025-01-02 PROCEDURE — 1159F MED LIST DOCD IN RCRD: CPT | Performed by: NURSE PRACTITIONER

## 2025-01-02 PROCEDURE — 1125F AMNT PAIN NOTED PAIN PRSNT: CPT | Performed by: NURSE PRACTITIONER

## 2025-01-02 RX ORDER — CYCLOBENZAPRINE HCL 5 MG
5 TABLET ORAL 2 TIMES DAILY PRN
Qty: 20 TABLET | Refills: 0 | Status: SHIPPED | OUTPATIENT
Start: 2025-01-02

## 2025-01-02 RX ORDER — PREDNISONE 5 MG/1
TABLET ORAL
Qty: 21 TABLET | Refills: 0 | Status: SHIPPED | OUTPATIENT
Start: 2025-01-02

## 2025-01-02 NOTE — PROGRESS NOTES
"Chief Complaint  Back Pain (Low back pain since 12/31/24 - no injury)    Subjective        Marina Hugo presents to [facility]  History of Present Illness  Patient is here to discuss new onset back pain that started Tuesday and worsening yesterday. She took some tylenol arthritis twice with some improvement. She states the pain is worse today especially with getting up and sitting down. States the pain is located where her underwear line is. She does not have a chronic history of back issues. She is urinating fine, no burning or frequency. Denies radiation of the pain down to either leg. She also tried biofreeze with little improvement.   Objective   Vital Signs:  /80 (BP Location: Left arm, Patient Position: Sitting, Cuff Size: Large Adult) Comment (BP Location): forearm  Pulse 74   Temp 98.6 °F (37 °C) (Temporal)   Ht 160 cm (63\")   Wt 128 kg (281 lb 6.4 oz)   SpO2 92%   BMI 49.85 kg/m²   Estimated body mass index is 49.85 kg/m² as calculated from the following:    Height as of this encounter: 160 cm (63\").    Weight as of this encounter: 128 kg (281 lb 6.4 oz).            Physical Exam  Constitutional:       Appearance: Normal appearance. She is obese.   HENT:      Head: Normocephalic.      Nose: Nose normal.   Eyes:      Extraocular Movements: Extraocular movements intact.      Pupils: Pupils are equal, round, and reactive to light.   Cardiovascular:      Rate and Rhythm: Normal rate and regular rhythm.      Heart sounds: Normal heart sounds.   Pulmonary:      Effort: Pulmonary effort is normal.      Breath sounds: Normal breath sounds.   Musculoskeletal:         General: Normal range of motion.      Cervical back: Normal range of motion.   Skin:     General: Skin is warm and dry.   Neurological:      General: No focal deficit present.      Mental Status: She is alert and oriented to person, place, and time.   Psychiatric:         Mood and Affect: Mood normal.      "   Result Review :                Assessment and Plan   Diagnoses and all orders for this visit:    1. Acute bilateral low back pain without sciatica (Primary)  -     cyclobenzaprine (FLEXERIL) 5 MG tablet; Take 1 tablet by mouth 2 (Two) Times a Day As Needed for Muscle Spasms.  Dispense: 20 tablet; Refill: 0  -     predniSONE 5 MG (21) tablet therapy pack dose pack; Take as directed on package instructions.  Dispense: 21 tablet; Refill: 0  -     XR Spine Lumbar AP & Lateral; Future             Follow Up   Return if symptoms worsen or fail to improve.  Patient was given instructions and counseling regarding her condition or for health maintenance advice. Please see specific information pulled into the AVS if appropriate.

## 2025-01-16 ENCOUNTER — TELEPHONE (OUTPATIENT)
Dept: FAMILY MEDICINE CLINIC | Facility: CLINIC | Age: 70
End: 2025-01-16

## 2025-01-16 DIAGNOSIS — M54.50 ACUTE BILATERAL LOW BACK PAIN WITHOUT SCIATICA: ICD-10-CM

## 2025-01-16 RX ORDER — CYCLOBENZAPRINE HCL 5 MG
5 TABLET ORAL 2 TIMES DAILY PRN
Qty: 20 TABLET | Refills: 0 | Status: SHIPPED | OUTPATIENT
Start: 2025-01-16

## 2025-01-16 NOTE — TELEPHONE ENCOUNTER
Caller: Marina Hugo    Relationship to patient: Self      Best call back number:      Provider: DR EDUARDO ZHANG    Medication PA needed:     cyclobenzaprine (FLEXERIL) 5 MG tablet       Reason for call/Prior Auth: PATIENTS SAYS THAT HER PHARMACY WILL NEED A PRIOR AUTHORIZATION FAXED BEFORE THIS CAN BE REFILLED (FAX NUMBER FOR [employer]  [phone]) PATIENT WANTS TO BE CONTACTED ONCE THIS IS COMPLETED.

## 2025-01-16 NOTE — TELEPHONE ENCOUNTER
Spoke with patient, sent refill to provider and told her if medication needs a P.A. it will be done and she will be notified.

## 2025-02-05 ENCOUNTER — TELEPHONE (OUTPATIENT)
Dept: FAMILY MEDICINE CLINIC | Facility: CLINIC | Age: 70
End: 2025-02-05

## 2025-02-05 ENCOUNTER — OFFICE VISIT (OUTPATIENT)
Dept: FAMILY MEDICINE CLINIC | Facility: CLINIC | Age: 70
End: 2025-02-05
Payer: MEDICARE

## 2025-02-05 VITALS
OXYGEN SATURATION: 93 % | WEIGHT: 275.6 LBS | HEART RATE: 89 BPM | DIASTOLIC BLOOD PRESSURE: 80 MMHG | HEIGHT: 63 IN | BODY MASS INDEX: 48.83 KG/M2 | SYSTOLIC BLOOD PRESSURE: 128 MMHG | TEMPERATURE: 97.8 F

## 2025-02-05 DIAGNOSIS — U07.1 COVID-19 VIRUS INFECTION: Primary | ICD-10-CM

## 2025-02-05 DIAGNOSIS — R68.83 CHILLS: ICD-10-CM

## 2025-02-05 LAB
EXPIRATION DATE: ABNORMAL
FLUAV AG UPPER RESP QL IA.RAPID: NOT DETECTED
FLUBV AG UPPER RESP QL IA.RAPID: NOT DETECTED
INTERNAL CONTROL: ABNORMAL
Lab: ABNORMAL
SARS-COV-2 AG UPPER RESP QL IA.RAPID: DETECTED

## 2025-02-05 PROCEDURE — 1159F MED LIST DOCD IN RCRD: CPT | Performed by: NURSE PRACTITIONER

## 2025-02-05 PROCEDURE — 1160F RVW MEDS BY RX/DR IN RCRD: CPT | Performed by: NURSE PRACTITIONER

## 2025-02-05 PROCEDURE — 3079F DIAST BP 80-89 MM HG: CPT | Performed by: NURSE PRACTITIONER

## 2025-02-05 PROCEDURE — 1125F AMNT PAIN NOTED PAIN PRSNT: CPT | Performed by: NURSE PRACTITIONER

## 2025-02-05 PROCEDURE — 87428 SARSCOV & INF VIR A&B AG IA: CPT | Performed by: NURSE PRACTITIONER

## 2025-02-05 PROCEDURE — 3074F SYST BP LT 130 MM HG: CPT | Performed by: NURSE PRACTITIONER

## 2025-02-05 PROCEDURE — 99214 OFFICE O/P EST MOD 30 MIN: CPT | Performed by: NURSE PRACTITIONER

## 2025-02-05 RX ORDER — DEXTROMETHORPHAN HYDROBROMIDE AND PROMETHAZINE HYDROCHLORIDE 15; 6.25 MG/5ML; MG/5ML
5 SYRUP ORAL NIGHTLY PRN
Qty: 118 ML | Refills: 0 | Status: SHIPPED | OUTPATIENT
Start: 2025-02-05 | End: 2025-02-05 | Stop reason: SDUPTHER

## 2025-02-05 RX ORDER — DEXTROMETHORPHAN HYDROBROMIDE AND PROMETHAZINE HYDROCHLORIDE 15; 6.25 MG/5ML; MG/5ML
5 SYRUP ORAL NIGHTLY PRN
Qty: 118 ML | Refills: 0 | Status: SHIPPED | OUTPATIENT
Start: 2025-02-05

## 2025-02-05 NOTE — PATIENT INSTRUCTIONS
Continue increased intake of clear liquids and rest.  Try plain Mucinex to thin secretions.  Try Promethazine DM at bedtime for cough and continue Tessalon perles during the day.  Use Albuterol inhaler as needed for shortness of breath.  Follow up if symptoms persist or worsen.

## 2025-02-05 NOTE — TELEPHONE ENCOUNTER
Spoke with patient over the phone; will try [facility] [place] to see if they have Promethazine DM available.

## 2025-02-05 NOTE — PROGRESS NOTES
Subjective   Marina Hugo is a 69 y.o. female.     Chief Complaint   Patient presents with    Headache    Cough    Nasal Congestion       History of Present Illness   Patient of Dr. Rebollar and is new to me; patient presents with c/o headache, coughing, nasal congestion, and body aches; woke up 2/2/25 night with increased cough; has chest discomfort due to cough; has had nonproductive cough; no SOA; has had stuffy nose; some yellow nasal drainage; has had some ear discomfort; ears area tender at times; has had sore throat; no nausea, vomiting, or diarrhea; has headache; no fever, but some chills like cannot get warm; gets bronchitis frequently, but feels like symptoms more in head than bronchitis; has tried Vicks Vapor rub and Tylenol arthritis and helped some; no known exposure to illness; has not needed to use Albuterol inhaler; has been using Tessalon perles and help some    Gets Dupixent injections per allergist and has really helped.    F/U DM2: does not monitor blood sugar; had been off diabetes medications when had last A1c.      The following portions of the patient's history were reviewed and updated as appropriate: allergies, current medications, past family history, past medical history, past social history, past surgical history and problem list.    Current Outpatient Medications on File Prior to Visit   Medication Sig    Acetaminophen (TYLENOL ARTHRITIS PAIN PO) Take  by mouth 4 (Four) Times a Day As Needed.    amLODIPine (NORVASC) 10 MG tablet Take 1 tablet by mouth every night at bedtime.    atorvastatin (LIPITOR) 20 MG tablet Take 1 tablet by mouth Daily.    benzonatate (TESSALON) 200 MG capsule Take 1 capsule by mouth 3 (Three) Times a Day As Needed for Cough.    celecoxib (CeleBREX) 200 MG capsule Take 1 capsule by mouth 2 (Two) Times a Day.    cetirizine (zyrTEC) 10 MG tablet Take 1 tablet by mouth Every Night.    Dupixent 300 MG/2ML solution prefilled syringe     empagliflozin (Jardiance) 10 MG  tablet tablet Take 1 tablet by mouth every night at bedtime.    ferrous sulfate 325 (65 FE) MG tablet TAKE 1 TABLET BY MOUTH EVERY DAY AT NIGHT    glimepiride (AMARYL) 4 MG tablet Take 1 tablet by mouth Every Morning Before Breakfast.    hydrocortisone 2.5 % cream Apply 1 Application topically to the appropriate area as directed 2 (Two) Times a Day.    Insulin Glargine, 1 Unit Dial, (Toujeo SoloStar) 300 UNIT/ML solution pen-injector injection Inject 40 Units under the skin into the appropriate area as directed Daily.    Insulin Pen Needle (BD Pen Needle Mariel 2nd Gen) 32G X 4 MM misc Use QD    metFORMIN ER (GLUCOPHAGE-XR) 500 MG 24 hr tablet Take 4 tablets by mouth Every Night.    pioglitazone (ACTOS) 30 MG tablet Take 1 tablet by mouth every night at bedtime.    Semaglutide, 2 MG/DOSE, (OZEMPIC) 8 MG/3ML solution pen-injector Inject 2 mg under the skin into the appropriate area as directed 1 (One) Time Per Week.    valsartan-hydrochlorothiazide (DIOVAN-HCT) 160-12.5 MG per tablet Take 1 tablet by mouth Daily.    albuterol sulfate  (90 Base) MCG/ACT inhaler Inhale 2 puffs Every 4 (Four) Hours As Needed for Wheezing. (Patient not taking: Reported on 2/5/2025)    cyclobenzaprine (FLEXERIL) 5 MG tablet Take 1 tablet by mouth 2 (Two) Times a Day As Needed for Muscle Spasms. (Patient not taking: Reported on 2/5/2025)    dicyclomine (BENTYL) 10 MG capsule Take 1 capsule by mouth As Needed for Abdominal Cramping. (Patient not taking: Reported on 2/5/2025)    Fluticasone-Umeclidin-Vilant (Trelegy Ellipta) 200-62.5-25 MCG/ACT inhaler Inhale 1 puff Daily. (Patient not taking: Reported on 2/5/2025)    levalbuterol (XOPENEX) 0.63 MG/3ML nebulizer solution Take 1 ampule by nebulization Every 6 (Six) Hours As Needed. (Patient not taking: Reported on 2/5/2025)     No current facility-administered medications on file prior to visit.        Past Medical History:   Diagnosis Date    Anemia     Anesthesia complication     SLOW  TO WAKE UP    Arthritis     Asthma     Diabetes mellitus     High cholesterol     Hyperlipidemia     Hypertension     Irritable bowel syndrome     Obesity     Rotator cuff tear     Routine eye exam due    Sleep apnea     MOUTH PIECE       Past Surgical History:   Procedure Laterality Date    CARPAL TUNNEL RELEASE Right      SECTION      COLONOSCOPY      LAPAROSCOPIC GASTRIC BANDING      SHOULDER ROTATOR CUFF REPAIR Right 2021    Procedure: RIGHT SHOULDER ARTHROSCOPY WITH ACROMIOPLASTY, ROTATOR CUFF AND LABRAL DEBRIDEMENT;  Surgeon: Raj Walkre MD;  [place] [facility] [place] [facility] [place]  Service: [place]opedics;  Laterality: Right;       Family History   Problem Relation Age of Onset    Anxiety disorder Mother     Colon cancer Mother     Depression Mother     Diabetes Mother     Hypertension Mother     Nephrolithiasis Mother     Macular degeneration Mother     Diabetes Father     Heart disease Father     Hypertension Father     Malig Hyperthermia Neg Hx        Social History     Socioeconomic History    Marital status:    Tobacco Use    Smoking status: Never     Passive exposure: Never    Smokeless tobacco: Never   Vaping Use    Vaping status: Never Used   Substance and Sexual Activity    Alcohol use: No     Comment: Caffeine use: 2-3 cups daily    Drug use: No    Sexual activity: Defer       Review of Systems   Constitutional:  Positive for chills. Negative for appetite change, fever, unexpected weight gain and unexpected weight loss. Fatigue: some due to not sleeping as well with cough.  HENT:  Positive for sinus pressure. Negative for trouble swallowing.    Eyes:  Negative for blurred vision and discharge.   Respiratory:  Positive for cough. Negative for chest tightness and shortness of breath.    Cardiovascular:  Negative for palpitations and leg swelling. Chest pain: see HPI, when coughing.  Gastrointestinal:  Negative for abdominal pain (some due to cough).   Genitourinary:  Negative for decreased  "urine volume.   Musculoskeletal:  Negative for back pain. Arthralgias: some aching in shoulders with cough.  Skin:  Negative for rash.   Neurological:  Positive for headache. Negative for dizziness and light-headedness.       Objective   Vitals:    02/05/25 1317   BP: 128/80   BP Location: Left arm   Patient Position: Sitting  Comment: forearm   Cuff Size: Adult   Pulse: 89   Temp: 97.8 °F (36.6 °C)   TempSrc: Temporal   SpO2: 93%   Weight: 125 kg (275 lb 9.6 oz)   Height: 160 cm (62.99\")     Body mass index is 48.83 kg/m².    Physical Exam  Vitals and nursing note reviewed.   Constitutional:       General: She is not in acute distress.     Appearance: She is well-developed and well-groomed. She is not diaphoretic.   HENT:      Head: Normocephalic.      Right Ear: Tympanic membrane and external ear normal. No decreased hearing noted.      Left Ear: Tympanic membrane and external ear normal. No decreased hearing noted.      Nose: Nose normal.      Right Sinus: Right maxillary sinus tenderness: mild. Right frontal sinus tenderness: mild.      Left Sinus: Left maxillary sinus tenderness: mild. Left frontal sinus tenderness: mild.      Mouth/Throat:      Mouth: Mucous membranes are moist.      Pharynx: No oropharyngeal exudate. Posterior oropharyngeal erythema: mild.  Eyes:      Conjunctiva/sclera: Conjunctivae normal.   Neck:      Vascular: No carotid bruit.   Cardiovascular:      Rate and Rhythm: Normal rate and regular rhythm.      Pulses: Normal pulses.      Heart sounds: Normal heart sounds. No murmur heard.  Pulmonary:      Effort: Pulmonary effort is normal. No respiratory distress.      Breath sounds: Normal breath sounds. No decreased breath sounds, wheezing or rales.   Abdominal:      General: Bowel sounds are normal.      Palpations: Abdomen is soft. There is no hepatomegaly or splenomegaly.      Tenderness: There is no abdominal tenderness. There is no guarding.   Musculoskeletal:      Cervical back: Normal " range of motion and neck supple.      Right lower leg: No edema.      Left lower leg: No edema.   Lymphadenopathy:      Cervical: Cervical adenopathy: approx 0.5 anterior cervical lymph node tender bilaterally.   Skin:     General: Skin is warm and dry.      Findings: No rash.   Neurological:      Mental Status: She is alert and oriented to person, place, and time.      Gait: Abnormal gait: guarded gait.   Psychiatric:         Mood and Affect: Mood normal.         Behavior: Behavior normal.         Thought Content: Thought content normal.         Cognition and Memory: Cognition normal.         Judgment: Judgment normal.         Lab Results   Component Value Date    WBC 7.9 03/20/2024    RBC 4.75 03/20/2024    HGB 14.9 03/20/2024    HCT 45.2 03/20/2024    MCV 95 03/20/2024    MCH 31.4 03/20/2024    MCHC 33.0 03/20/2024    RDW 13.0 03/20/2024    RDWSD 64.8 (H) 06/03/2021    MPV 9.8 06/03/2021     03/20/2024    NEUTRORELPCT 58 03/20/2024    LYMPHORELPCT 30 03/20/2024    MONORELPCT 7 03/20/2024    EOSRELPCT 3 03/20/2024    BASORELPCT 1 03/20/2024    NEUTROABS 4.7 03/20/2024    LYMPHSABS 2.4 03/20/2024    MONOSABS 0.5 03/20/2024    EOSABS 0.2 03/20/2024    BASOSABS 0.1 03/20/2024    NRBC 0.0 03/26/2021     Lab Results   Component Value Date    GLUCOSE 186 (H) 07/31/2024    BUN 23 07/31/2024    CREATININE 0.80 07/31/2024    EGFRIFNONA 74 11/23/2021    EGFRIFAFRI 85 11/23/2021    BCR 29 (H) 07/31/2024    K 4.3 07/31/2024    CO2 22 07/31/2024    CALCIUM 10.0 07/31/2024    PROTENTOTREF 6.7 03/20/2024    ALBUMIN 4.4 03/20/2024    LABIL2 1.9 03/20/2024    AST 12 03/20/2024    ALT 12 03/20/2024      Lab Results   Component Value Date    CHLPL 254 (H) 07/31/2024    TRIG 155 (H) 07/31/2024    HDL 71 07/31/2024    VLDL 27 07/31/2024     (H) 07/31/2024     Lab Results   Component Value Date    TSH 1.680 03/20/2024     Lab Results   Component Value Date    HGBA1C 11.3 (H) 07/31/2024           Assessment    Problem List  Items Addressed This Visit       COVID-19 virus infection - Primary    Current Assessment & Plan     Continue increased intake of clear liquids and rest.  Try plain Mucinex to thin secretions.  Try Promethazine DM at bedtime for cough and continue Tessalon perles during the day.  Use Albuterol inhaler as needed for shortness of breath.         Relevant Medications    promethazine-dextromethorphan (PROMETHAZINE-DM) 6.25-15 MG/5ML syrup     Other Visit Diagnoses       Chills        Relevant Orders    POCT SARS-CoV-2 Antigen DONYA + Flu (Completed)             Return if symptoms worsen or fail to improve.  Positive for COVID-19 virus today; discussed risks vs benefits of Paxlovid and possible interactions with other medications; pt declines Paxlovid at this time; will try Promethazine DM at bedtime for cough; pt has tolerated in past without problems; instructed to go to ER if chest pain, SOA, etc.       I spent 35 minutes caring for Marina on this date of service. This time includes time spent by me in the following activities:reviewing tests, obtaining and/or reviewing a separately obtained history, performing a medically appropriate examination and/or evaluation , counseling and educating the patient/family/caregiver, ordering medications, tests, or procedures, and documenting information in the medical record    COVID-19 Precautions - Patient was compliant in wearing a mask. When I saw the patient, I used appropriate personal protective equipment (PPE) including M95 mask, gloves, and eye shield (standard procedure).  Hand hygiene was completed before and after seeing the patient.

## 2025-02-05 NOTE — TELEPHONE ENCOUNTER
The following patient called and stated the cough medication that was rx'd today is on back order per the pharmacy and wants to know if something different can be called in.   The following medication was rx'd today  promethazine-dextromethorphan (PROMETHAZINE-DM) 6.25-15 MG/5ML syrup

## 2025-02-06 PROBLEM — U07.1 COVID-19 VIRUS INFECTION: Status: ACTIVE | Noted: 2025-02-06

## 2025-02-06 NOTE — ASSESSMENT & PLAN NOTE
Continue increased intake of clear liquids and rest.  Try plain Mucinex to thin secretions.  Try Promethazine DM at bedtime for cough and continue Tessalon perles during the day.  Use Albuterol inhaler as needed for shortness of breath.

## 2025-03-03 ENCOUNTER — TELEPHONE (OUTPATIENT)
Dept: FAMILY MEDICINE CLINIC | Facility: CLINIC | Age: 70
End: 2025-03-03
Payer: MEDICARE

## 2025-03-03 NOTE — TELEPHONE ENCOUNTER
Tried started a PA for this patient.  This was the message we got from covermymeds    Cannot find matching patient with Name and Date of Birth provided. Please make sure the patient's name and date of birth are spelled and formatted exactly as shown on the member's insurance card.    Called the patient and asked her to call her insurance to verify that they have her correct name spelling  and asked her to have them initiate the PA

## 2025-03-04 ENCOUNTER — TELEPHONE (OUTPATIENT)
Dept: FAMILY MEDICINE CLINIC | Facility: CLINIC | Age: 70
End: 2025-03-04
Payer: MEDICARE

## 2025-03-05 ENCOUNTER — TELEPHONE (OUTPATIENT)
Dept: FAMILY MEDICINE CLINIC | Facility: CLINIC | Age: 70
End: 2025-03-05
Payer: MEDICARE

## 2025-03-05 NOTE — TELEPHONE ENCOUNTER
Spoke to patient and she said she got a call last night from [employer] stating that it was approved.  Going to call Pharmacy now

## 2025-03-05 NOTE — TELEPHONE ENCOUNTER
[facility] VIVIEN TO RELAY     Ozempic     PA was approved for Marina.  Case Number [id number]-Approved PA from 2/2/25-3/4/26

## 2025-03-05 NOTE — TELEPHONE ENCOUNTER
Name: Marina Hugo      Relationship: Self      Best Callback Number: [phone]       HUB PROVIDED THE RELAY MESSAGE FROM THE OFFICE      PATIENT: HAS FURTHER QUESTIONS AND WOULD LIKE A CALL BACK AT THE FOLLOWING PHONE [phone]    ADDITIONAL INFORMATION: PLEASE CALL. PATIENT HAS ADDITIONAL QUESTIONS

## 2025-03-13 ENCOUNTER — TELEPHONE (OUTPATIENT)
Dept: FAMILY MEDICINE CLINIC | Facility: CLINIC | Age: 70
End: 2025-03-13
Payer: MEDICARE

## 2025-03-13 NOTE — TELEPHONE ENCOUNTER
Pt called and states that the Ozempic is still going to be over $600 initially and then over $200 and she is just not sure what she needs to do.  Does she need to make an appointment to come in and discuss her options for her diabetes.  Just pretty upset.  Please advise.

## 2025-03-17 NOTE — PROGRESS NOTES
Chief Complaint:   Chief Complaint   Patient presents with    Diabetes     Med refill due   / to discuss insulin with patient - dosage  40 units a day per pt     Hypertension    Hyperlipidemia    Asthma       Marina P [place] 68 y.o. female who presents today for Medical Management of the below listed issues. She  has a problem list of   Patient Active Problem List   Diagnosis    Hypertension    Asthma    Diabetes mellitus    Hyperlipidemia    Irritable bowel syndrome    Osteoarthritis    Chronic cough    Polyp of colon   .  Since the last visit, She has been seeing her allergist/pulmonary doctor, as well as her orthopedist and receiving injections of her knees. Has lost 29 lbs and feeling much better.  she has been compliant with   Current Outpatient Medications:     amLODIPine (NORVASC) 10 MG tablet, Take 1 tablet by mouth every night at bedtime., Disp: 90 tablet, Rfl: 1    atorvastatin (LIPITOR) 20 MG tablet, Take 1 tablet by mouth Daily., Disp: 90 tablet, Rfl: 1    Azelastine HCl 137 MCG/SPRAY solution, INSTILL 2 SPRAYS IN EACH NOSTRIL TWICE A DAY, Disp: , Rfl:     celecoxib (CeleBREX) 200 MG capsule, Take 1 capsule by mouth 2 (Two) Times a Day., Disp: 180 capsule, Rfl: 1    dicyclomine (BENTYL) 10 MG capsule, Take 1 capsule by mouth 2 (Two) Times a Day., Disp: 180 capsule, Rfl: 1    empagliflozin (Jardiance) 10 MG tablet tablet, Take 1 tablet by mouth every night at bedtime., Disp: 90 tablet, Rfl: 1    Fluticasone-Umeclidin-Vilant (Trelegy Ellipta) 200-62.5-25 MCG/ACT aerosol powder , Inhale 1 puff Daily., Disp: 180 each, Rfl: 1    glimepiride (AMARYL) 4 MG tablet, Take 1 tablet by mouth Every Morning Before Breakfast., Disp: 90 tablet, Rfl: 1    Insulin Glargine, 1 Unit Dial, (Toujeo SoloStar) 300 UNIT/ML solution pen-injector injection, Inject 40 Units under the skin into the appropriate area as directed Daily., Disp: 18 mL, Rfl: 1    metFORMIN ER (GLUCOPHAGE-XR) 500 MG 24 hr tablet, Take 4 tablets by mouth  "Every Night., Disp: 360 tablet, Rfl: 1    pioglitazone (ACTOS) 30 MG tablet, Take 1 tablet by mouth every night at bedtime., Disp: 90 tablet, Rfl: 1    valsartan-hydrochlorothiazide (DIOVAN-HCT) 160-12.5 MG per tablet, Take 1 tablet by mouth Daily., Disp: 90 tablet, Rfl: 1    Acetaminophen (TYLENOL ARTHRITIS PAIN PO), Take  by mouth 4 (Four) Times a Day As Needed., Disp: , Rfl:     cetirizine (zyrTEC) 10 MG tablet, Take 1 tablet by mouth Every Night., Disp: , Rfl:     ferrous sulfate 325 (65 FE) MG tablet, Take 1 tablet by mouth Every Night. With food, Disp: 90 tablet, Rfl: 1    Insulin Pen Needle (BD Pen Needle Mariel 2nd Gen) 32G X 4 MM misc, USE DAILY WITH VICTOZA, Disp: 100 each, Rfl: 3    Semaglutide, 1 MG/DOSE, (OZEMPIC) 4 MG/3ML solution pen-injector, Inject 1 mg under the skin into the appropriate area as directed 1 (One) Time Per Week., Disp: 9 mL, Rfl: 1.  She denies medication side effects.    All of the other chronic condition(s) listed above are stable w/o issues.    /71   Pulse 84   Temp 97.9 °F (36.6 °C) (Oral)   Resp 16   Ht 160 cm (63\")   Wt 119 kg (262 lb)   SpO2 96%   BMI 46.41 kg/m²     Results for orders placed or performed in visit on 08/24/23   POCT SARS-CoV-2 Antigen DONYA + Flu    Specimen: Swab   Result Value Ref Range    SARS Antigen Not Detected Not Detected, Presumptive Negative    Influenza A Antigen DONYA Not Detected Not Detected    Influenza B Antigen DONYA Not Detected Not Detected    Internal Control Passed Passed    Lot Number [id number]     Expiration Date 03/16/2024              The following portions of the patient's history were reviewed and updated as appropriate: allergies, current medications, past family history, past medical history, past social history, past surgical history, and problem list.    Review of Systems   Constitutional:  Negative for activity change, chills and fever.   Respiratory:  Negative for cough.    Cardiovascular:  Negative for chest pain. "   Psychiatric/Behavioral:  Negative for dysphoric mood.        Objective              Physical Exam  Constitutional:       General: She is not in acute distress.     Appearance: She is well-developed.   Cardiovascular:      Rate and Rhythm: Normal rate and regular rhythm.   Pulmonary:      Effort: Pulmonary effort is normal.      Breath sounds: Normal breath sounds.   Neurological:      Mental Status: She is alert and oriented to person, place, and time.   Psychiatric:         Behavior: Behavior normal.         Thought Content: Thought content normal.             Diagnoses and all orders for this visit:    1. Type 2 diabetes mellitus without complication, without long-term current use of insulin (Primary)  -     Comprehensive metabolic panel  -     Lipid panel  -     Hemoglobin A1c  -     MicroAlbumin, Urine, Random - Urine, Clean Catch  -     empagliflozin (Jardiance) 10 MG tablet tablet; Take 1 tablet by mouth every night at bedtime.  Dispense: 90 tablet; Refill: 1  -     glimepiride (AMARYL) 4 MG tablet; Take 1 tablet by mouth Every Morning Before Breakfast.  Dispense: 90 tablet; Refill: 1  -     Insulin Glargine, 1 Unit Dial, (Toujeo SoloStar) 300 UNIT/ML solution pen-injector injection; Inject 40 Units under the skin into the appropriate area as directed Daily.  Dispense: 18 mL; Refill: 1  -     metFORMIN ER (GLUCOPHAGE-XR) 500 MG 24 hr tablet; Take 4 tablets by mouth Every Night.  Dispense: 360 tablet; Refill: 1  -     pioglitazone (ACTOS) 30 MG tablet; Take 1 tablet by mouth every night at bedtime.  Dispense: 90 tablet; Refill: 1  -     Semaglutide, 1 MG/DOSE, (OZEMPIC) 4 MG/3ML solution pen-injector; Inject 1 mg under the skin into the appropriate area as directed 1 (One) Time Per Week.  Dispense: 9 mL; Refill: 1    2. Essential hypertension  -     Comprehensive metabolic panel  -     Lipid panel  -     CBC and Differential  -     TSH  -     amLODIPine (NORVASC) 10 MG tablet; Take 1 tablet by mouth every  night at bedtime.  Dispense: 90 tablet; Refill: 1  -     valsartan-hydrochlorothiazide (DIOVAN-HCT) 160-12.5 MG per tablet; Take 1 tablet by mouth Daily.  Dispense: 90 tablet; Refill: 1    3. Mixed hyperlipidemia  -     Lipid panel  -     atorvastatin (LIPITOR) 20 MG tablet; Take 1 tablet by mouth Daily.  Dispense: 90 tablet; Refill: 1    4. Piriformis syndrome of both sides  -     celecoxib (CeleBREX) 200 MG capsule; Take 1 capsule by mouth 2 (Two) Times a Day.  Dispense: 180 capsule; Refill: 1    5. Irritable bowel syndrome without diarrhea  -     dicyclomine (BENTYL) 10 MG capsule; Take 1 capsule by mouth 2 (Two) Times a Day.  Dispense: 180 capsule; Refill: 1    6. Morbid (severe) obesity due to excess calories    7. Moderate persistent asthma with acute exacerbation  -     Fluticasone-Umeclidin-Vilant (Trelegy Ellipta) 200-62.5-25 MCG/ACT aerosol powder ; Inhale 1 puff Daily.  Dispense: 180 each; Refill: 1    8. Screening for colon cancer  -     Ambulatory Referral to Gastroenterology                  Mood and Affect: Mood normal.         Behavior: Behavior normal.          The patient (or guardian, if applicable) and other individuals in attendance with the patient were advised that Artificial Intelligence will be utilized during this visit to record, process the conversation to generate a clinical note, and support improvement of the AI technology. The patient (or guardian, if applicable) and other individuals in attendance at the appointment consented to the use of AI, including the recording.      Angie Irene MD

## 2025-03-26 ENCOUNTER — OFFICE VISIT (OUTPATIENT)
Dept: FAMILY MEDICINE CLINIC | Facility: CLINIC | Age: 70
End: 2025-03-26
Payer: MEDICARE

## 2025-03-26 VITALS
HEART RATE: 68 BPM | SYSTOLIC BLOOD PRESSURE: 128 MMHG | TEMPERATURE: 97.7 F | WEIGHT: 275.2 LBS | DIASTOLIC BLOOD PRESSURE: 80 MMHG | OXYGEN SATURATION: 94 % | HEIGHT: 63 IN | BODY MASS INDEX: 48.76 KG/M2

## 2025-03-26 DIAGNOSIS — J30.9 ALLERGIC RHINITIS, UNSPECIFIED SEASONALITY, UNSPECIFIED TRIGGER: Primary | ICD-10-CM

## 2025-03-26 DIAGNOSIS — Z20.822 EXPOSURE TO COVID-19 VIRUS: ICD-10-CM

## 2025-03-26 PROCEDURE — 1125F AMNT PAIN NOTED PAIN PRSNT: CPT | Performed by: NURSE PRACTITIONER

## 2025-03-26 PROCEDURE — 3074F SYST BP LT 130 MM HG: CPT | Performed by: NURSE PRACTITIONER

## 2025-03-26 PROCEDURE — 1160F RVW MEDS BY RX/DR IN RCRD: CPT | Performed by: NURSE PRACTITIONER

## 2025-03-26 PROCEDURE — 1159F MED LIST DOCD IN RCRD: CPT | Performed by: NURSE PRACTITIONER

## 2025-03-26 PROCEDURE — 99213 OFFICE O/P EST LOW 20 MIN: CPT | Performed by: NURSE PRACTITIONER

## 2025-03-26 PROCEDURE — 3079F DIAST BP 80-89 MM HG: CPT | Performed by: NURSE PRACTITIONER

## 2025-03-26 PROCEDURE — 87428 SARSCOV & INF VIR A&B AG IA: CPT | Performed by: NURSE PRACTITIONER

## 2025-03-26 NOTE — PROGRESS NOTES
Subjective   Marina Hugo is a 69 y.o. female.     Chief Complaint   Patient presents with    Exposure To Known Illness     She was exposed to covid this past weekend.    Cough    Headache       Cough  Associated symptoms include headaches. Pertinent negatives include no chest pain, chills, fever, postnasal drip, rash, shortness of breath or weight loss. Rhinorrhea: mild.  Headache     Patient presents with c/o cough since 3/23/25; then found out had been exposed to COVID-19 when with friends night before; also exposed to illness at son's house in [place] 1 week ago; has had mild headache; had COVID-19 virus in February; no fever; has had nonproductive cough; has had clear nasal drainage; no ear pain or sore throat; no nausea, vomiting, or diarrhea; no SOA; no OTC treatment; has been taking Tessalon perles last couple of days and have helped; takes Zyrtec daily; has not needed to use Albuterol inhaler since has been on Dupixent May 2024.    F/U DM2: takes Jardiance, Toujeo, and Glimperide daily and Ozempic weekly; does not monitor blood sugars.    Has had trouble with iron supplement so has not been taking last couple of weeks; was having vomiting when taking; no constipation.      The following portions of the patient's history were reviewed and updated as appropriate: allergies, current medications, past family history, past medical history, past social history, past surgical history and problem list.    Current Outpatient Medications on File Prior to Visit   Medication Sig    Acetaminophen (TYLENOL ARTHRITIS PAIN PO) Take  by mouth 4 (Four) Times a Day As Needed.    albuterol sulfate  (90 Base) MCG/ACT inhaler Inhale 2 puffs Every 4 (Four) Hours As Needed for Wheezing.    amLODIPine (NORVASC) 10 MG tablet Take 1 tablet by mouth every night at bedtime.    atorvastatin (LIPITOR) 20 MG tablet Take 1 tablet by mouth Daily.    benzonatate (TESSALON) 200 MG capsule Take 1 capsule by mouth 3 (Three) Times a Day  As Needed for Cough.    celecoxib (CeleBREX) 200 MG capsule Take 1 capsule by mouth 2 (Two) Times a Day.    cetirizine (zyrTEC) 10 MG tablet Take 1 tablet by mouth Every Night.    cyclobenzaprine (FLEXERIL) 5 MG tablet Take 1 tablet by mouth 2 (Two) Times a Day As Needed for Muscle Spasms.    dicyclomine (BENTYL) 10 MG capsule Take 1 capsule by mouth As Needed for Abdominal Cramping.    Dupixent 300 MG/2ML solution prefilled syringe     empagliflozin (Jardiance) 10 MG tablet tablet Take 1 tablet by mouth every night at bedtime.    ferrous sulfate 325 (65 FE) MG tablet TAKE 1 TABLET BY MOUTH EVERY DAY AT NIGHT    glimepiride (AMARYL) 4 MG tablet Take 1 tablet by mouth Every Morning Before Breakfast.    hydrocortisone 2.5 % cream Apply 1 Application topically to the appropriate area as directed 2 (Two) Times a Day.    Insulin Glargine, 1 Unit Dial, (Toujeo SoloStar) 300 UNIT/ML solution pen-injector injection Inject 40 Units under the skin into the appropriate area as directed Daily.    Insulin Pen Needle (BD Pen Needle Mariel 2nd Gen) 32G X 4 MM misc Use QD    metFORMIN ER (GLUCOPHAGE-XR) 500 MG 24 hr tablet Take 4 tablets by mouth Every Night.    pioglitazone (ACTOS) 30 MG tablet Take 1 tablet by mouth every night at bedtime.    promethazine-dextromethorphan (PROMETHAZINE-DM) 6.25-15 MG/5ML syrup Take 5 mL by mouth At Night As Needed for Cough.    Semaglutide, 2 MG/DOSE, (OZEMPIC) 8 MG/3ML solution pen-injector Inject 2 mg under the skin into the appropriate area as directed 1 (One) Time Per Week.    valsartan-hydrochlorothiazide (DIOVAN-HCT) 160-12.5 MG per tablet Take 1 tablet by mouth Daily.    Fluticasone-Umeclidin-Vilant (Trelegy Ellipta) 200-62.5-25 MCG/ACT inhaler Inhale 1 puff Daily. (Patient not taking: Reported on 3/26/2025)    levalbuterol (XOPENEX) 0.63 MG/3ML nebulizer solution Take 1 ampule by nebulization Every 6 (Six) Hours As Needed. (Patient not taking: Reported on 3/26/2025)     No current  facility-administered medications on file prior to visit.        Past Medical History:   Diagnosis Date    Anemia     Anesthesia complication     SLOW TO WAKE UP    Arthritis     Asthma     Diabetes mellitus     High cholesterol     Hyperlipidemia     Hypertension     Irritable bowel syndrome     Obesity     Rotator cuff tear     Routine eye exam due    Sleep apnea     MOUTH PIECE       Past Surgical History:   Procedure Laterality Date    CARPAL TUNNEL RELEASE Right      SECTION      COLONOSCOPY      LAPAROSCOPIC GASTRIC BANDING      SHOULDER ROTATOR CUFF REPAIR Right 2021    Procedure: RIGHT SHOULDER ARTHROSCOPY WITH ACROMIOPLASTY, ROTATOR CUFF AND LABRAL DEBRIDEMENT;  Surgeon: Raj Walker MD;  [place] [facility] [place] [facility] [place]  Service: Orthopedics;  Laterality: Right;       Family History   Problem Relation Age of Onset    Anxiety disorder Mother     Colon cancer Mother     Depression Mother     Diabetes Mother     Hypertension Mother     Nephrolithiasis Mother     Macular degeneration Mother     Diabetes Father     Heart disease Father     Hypertension Father     Malig Hyperthermia Neg Hx        Social History     Socioeconomic History    Marital status:    Tobacco Use    Smoking status: Never     Passive exposure: Never    Smokeless tobacco: Never   Vaping Use    Vaping status: Never Used   Substance and Sexual Activity    Alcohol use: No     Comment: Caffeine use: 2-3 cups daily    Drug use: No    Sexual activity: Defer       Review of Systems   Constitutional:  Negative for appetite change, chills, fatigue, fever, unexpected weight gain and unexpected weight loss.   HENT:  Negative for postnasal drip, sinus pressure and trouble swallowing. Rhinorrhea: mild.   Respiratory:  Positive for cough. Negative for chest tightness and shortness of breath.    Cardiovascular:  Negative for chest pain, palpitations and leg swelling.   Gastrointestinal:  Negative for abdominal pain.   Skin:  Negative  "for rash.   Neurological:  Positive for headache. Negative for dizziness and light-headedness.       Objective   Vitals:    03/26/25 1113   BP: 128/80   BP Location: Left arm   Patient Position: Sitting   Cuff Size: Adult   Pulse: 68   Temp: 97.7 °F (36.5 °C)   TempSrc: Temporal   SpO2: 94%   Weight: 125 kg (275 lb 3.2 oz)   Height: 160 cm (62.99\")     Body mass index is 48.76 kg/m².    Physical Exam  Vitals and nursing note reviewed.   Constitutional:       General: She is not in acute distress.     Appearance: She is well-developed and well-groomed. She is not ill-appearing or diaphoretic.   HENT:      Head: Normocephalic.      Right Ear: Tympanic membrane and external ear normal. No decreased hearing noted.      Left Ear: External ear normal. No decreased hearing noted. Left ear middle ear effusion: mild. Tympanic membrane is not erythematous.      Nose: Nose normal.      Right Sinus: No maxillary sinus tenderness or frontal sinus tenderness.      Left Sinus: No maxillary sinus tenderness or frontal sinus tenderness.      Mouth/Throat:      Mouth: Mucous membranes are moist.      Pharynx: No oropharyngeal exudate or posterior oropharyngeal erythema.   Eyes:      Conjunctiva/sclera: Conjunctivae normal.   Neck:      Vascular: No carotid bruit.   Cardiovascular:      Rate and Rhythm: Normal rate and regular rhythm.      Pulses: Normal pulses.      Heart sounds: Normal heart sounds. No murmur heard.  Pulmonary:      Effort: Pulmonary effort is normal. No respiratory distress.      Breath sounds: Normal breath sounds. No decreased breath sounds or rales.   Abdominal:      General: Bowel sounds are normal.      Palpations: Abdomen is soft. There is no hepatomegaly or splenomegaly.      Tenderness: There is no abdominal tenderness. There is no guarding.   Musculoskeletal:      Cervical back: Normal range of motion and neck supple.      Right lower leg: No edema.      Left lower leg: No edema.   Lymphadenopathy:      " Cervical: No cervical adenopathy.   Skin:     General: Skin is warm and dry.      Findings: No rash.   Neurological:      Mental Status: She is alert and oriented to person, place, and time.      Gait: Gait normal.   Psychiatric:         Mood and Affect: Mood normal.         Behavior: Behavior normal.         Thought Content: Thought content normal.         Cognition and Memory: Cognition normal.         Judgment: Judgment normal.         Lab Results   Component Value Date    WBC 7.9 03/20/2024    RBC 4.75 03/20/2024    HGB 14.9 03/20/2024    HCT 45.2 03/20/2024    MCV 95 03/20/2024    MCH 31.4 03/20/2024    MCHC 33.0 03/20/2024    RDW 13.0 03/20/2024    RDWSD 64.8 (H) 06/03/2021    MPV 9.8 06/03/2021     03/20/2024    NEUTRORELPCT 58 03/20/2024    LYMPHORELPCT 30 03/20/2024    MONORELPCT 7 03/20/2024    EOSRELPCT 3 03/20/2024    BASORELPCT 1 03/20/2024    NEUTROABS 4.7 03/20/2024    LYMPHSABS 2.4 03/20/2024    MONOSABS 0.5 03/20/2024    EOSABS 0.2 03/20/2024    BASOSABS 0.1 03/20/2024    NRBC 0.0 03/26/2021     Lab Results   Component Value Date    GLUCOSE 186 (H) 07/31/2024    BUN 23 07/31/2024    CREATININE 0.80 07/31/2024    EGFRIFNONA 74 11/23/2021    EGFRIFAFRI 85 11/23/2021    BCR 29 (H) 07/31/2024    K 4.3 07/31/2024    CO2 22 07/31/2024    CALCIUM 10.0 07/31/2024    ALBUMIN 4.4 03/20/2024    AST 12 03/20/2024    ALT 12 03/20/2024      Lab Results   Component Value Date    CHLPL 254 (H) 07/31/2024    TRIG 155 (H) 07/31/2024    HDL 71 07/31/2024    VLDL 27 07/31/2024     (H) 07/31/2024     Lab Results   Component Value Date    TSH 1.680 03/20/2024     Lab Results   Component Value Date    HGBA1C 11.3 (H) 07/31/2024       Assessment    Problem List Items Addressed This Visit       Allergic rhinitis - Primary    Current Assessment & Plan   Increase intake of clear liquids and rest.  Continue Zyrtec daily.  Continue Tessalon perles as needed for cough.  Try over the counter nasal steroid, such as  Flonase or Nasacort.          Other Visit Diagnoses         Exposure to COVID-19 virus        Relevant Orders    POCT SARS-CoV-2 Antigen DONYA + Flu (Completed)             Return if symptoms worsen or fail to improve.  Negative for COVID-19 and flu today; will treat allergies and see if helps symptoms.  Patient will schedule follow up in April for repeat labs.       COVID-19 Precautions - Patient was compliant in wearing a mask. When I saw the patient, I used appropriate personal protective equipment (PPE) including N95 mask, gloves, and eye shield (standard procedure).  Hand hygiene was completed before and after seeing the patient.

## 2025-03-26 NOTE — PATIENT INSTRUCTIONS
Increase intake of clear liquids and rest.  Continue Tessalon perles as needed for cough.  Try over the counter nasal steroid, such as Flonase or Nasacort.  Follow up if symptoms persist or worsen.

## 2025-03-27 NOTE — ASSESSMENT & PLAN NOTE
Increase intake of clear liquids and rest.  Continue Zyrtec daily.  Continue Tessalon perles as needed for cough.  Try over the counter nasal steroid, such as Flonase or Nasacort.

## 2025-04-14 ENCOUNTER — TELEPHONE (OUTPATIENT)
Dept: FAMILY MEDICINE CLINIC | Facility: CLINIC | Age: 70
End: 2025-04-14
Payer: MEDICARE

## 2025-04-14 NOTE — TELEPHONE ENCOUNTER
Spoke to pt and she was able to get through to Patient Assistance for her Ozempic and she is filling out the forms and then she needed refill for her Jardiance and so she went to refill it and it was $420 and she can't afford that.  She has asked about a patient assitance program for that as well but she has now been off that for 5 days but wondering until she can get these forms filled out what is it you want her to do.  She feels completely the same and just wanting to know if she should fill it and try to meet deductible or do we have something comparable.

## 2025-04-14 NOTE — TELEPHONE ENCOUNTER
Caller: Marina Hugo    Relationship to patient: Self    Best call back number: [phone]      Patient is needing:     PATIENT IS REQUESTING A CALL BACK FROM [employer] REGARDING HER MEDICATIONS THEY HAVE BEEN SPEAKING ABOUT.     PLEASE CALL ASAP.

## 2025-04-25 DIAGNOSIS — E11.9 TYPE 2 DIABETES MELLITUS WITHOUT COMPLICATION, WITHOUT LONG-TERM CURRENT USE OF INSULIN: Chronic | ICD-10-CM

## 2025-04-25 NOTE — TELEPHONE ENCOUNTER
KORIN FOR HUB TO RELAY       LVMTCB-Samples for Jardiance 10mg left at the  in the [facility] location per Dr Rebollar

## 2025-05-28 DIAGNOSIS — E11.9 TYPE 2 DIABETES MELLITUS WITHOUT COMPLICATION, WITHOUT LONG-TERM CURRENT USE OF INSULIN: Chronic | ICD-10-CM

## 2025-05-29 DIAGNOSIS — I10 ESSENTIAL HYPERTENSION: Chronic | ICD-10-CM

## 2025-05-29 RX ORDER — VALSARTAN AND HYDROCHLOROTHIAZIDE 160; 12.5 MG/1; MG/1
1 TABLET, FILM COATED ORAL DAILY
Qty: 90 TABLET | Refills: 1 | Status: SHIPPED | OUTPATIENT
Start: 2025-05-29

## 2025-05-29 RX ORDER — AMLODIPINE BESYLATE 10 MG/1
10 TABLET ORAL
Qty: 90 TABLET | Refills: 1 | Status: SHIPPED | OUTPATIENT
Start: 2025-05-29

## 2025-05-29 NOTE — TELEPHONE ENCOUNTER
LOV                  3/26/25  NOV                  no fu  Last refill             12/16/24  Protocol              met

## 2025-06-10 DIAGNOSIS — E11.65 TYPE 2 DIABETES MELLITUS WITH HYPERGLYCEMIA, WITHOUT LONG-TERM CURRENT USE OF INSULIN: Primary | ICD-10-CM

## 2025-06-12 ENCOUNTER — TELEPHONE (OUTPATIENT)
Dept: FAMILY MEDICINE CLINIC | Facility: CLINIC | Age: 70
End: 2025-06-12
Payer: MEDICARE

## 2025-06-12 NOTE — TELEPHONE ENCOUNTER
Caller: STEPH    Relationship: Other [facility]     Best call back number: [phone]       What was the call regarding:     REGARDING THE [facility] ASSISTANCE PROGRAM     THEY ARE MISSING PATIENT CHECK URIEL FOR HIPAA ON PAGE 3. THEY CAN CONTINUE THE PROCESS AFTER RECEIVING THAT. THEY ARE NOT ABLE TO REACH THE PATIENT.

## 2025-06-12 NOTE — TELEPHONE ENCOUNTER
Caller: Marina Hugo    Relationship to patient: Self    Best call back number: [phone]    Patient is needing:     [employer] ASSISTANCE [employer]GRAM CALLED THE PATIENT AND TOLD HER TO CALL OFFICE TO GIVE THEM VERBAL AUTHORIZATION TO CHECK A BOX ON PAGE 3 SECTION E AND RESEND JUST THAT ONE PAGE TO [employer].    PATIENT ID NUMBER- [id number]    PATIENT IS GIVING PERMISSION FOR OFFICE TO CHECK THAT BOX.    PLEASE ADVISE.

## 2025-06-13 NOTE — TELEPHONE ENCOUNTER
I called and spoke with the patient and let her know that I had printed off and checked the box and sent it back in for her. She verbalized understanding with no further questions or concerns at this time and will call back if anything else is needed.

## 2025-06-17 ENCOUNTER — TELEPHONE (OUTPATIENT)
Dept: FAMILY MEDICINE CLINIC | Facility: CLINIC | Age: 70
End: 2025-06-17
Payer: MEDICARE

## 2025-06-17 NOTE — TELEPHONE ENCOUNTER
Name: Marina Hugo      Relationship: Self      Best Callback Number: [phone]       HUB PROVIDED THE RELAY MESSAGE FROM THE OFFICE      PATIENT: VOICED UNDERSTANDING AND HAS NO FURTHER QUESTIONS AT THIS TIME    ADDITIONAL INFORMATION: PATIENT WOULD JUST LIKE TO BE CALLED WHEN THIS IS IN.     PATIENT RECEIVED MESSAGE.

## 2025-06-17 NOTE — TELEPHONE ENCOUNTER
OKAY FOR HUB TO RELAY    LVMTCB- We heard back from the [employer] Patient Assistance program and you were approved.  They are going to be mailing us the Ozempic to our office and we will notify you when it is in.

## 2025-06-20 ENCOUNTER — TELEPHONE (OUTPATIENT)
Dept: FAMILY MEDICINE CLINIC | Facility: CLINIC | Age: 70
End: 2025-06-20
Payer: MEDICARE

## 2025-06-20 NOTE — TELEPHONE ENCOUNTER
Caller: Marina Hugo    Relationship: Self    Best call back number: [phone]    What is the best time to reach you: ASAP    Who are you requesting to speak with (clinical staff, provider,  specific staff member): HECTOR     What was the call regarding: PATIENT WANTED TO CHECK IN ABOUT SOME PAPERWORK REGARDING JARDIANCE. PATIENT IS NOT SURE IF SHE NEEDS TO BRING THIS INTO OFFICE OR IF SHE NEEDED TO MAIL IT. PLEASE ADVISE ASAP.

## 2025-06-20 NOTE — TELEPHONE ENCOUNTER
I called and spoke with the patient and she said she received something over the patient assistance program on her jardiance. She said they needed something for social security and she was wanting to bring it to the office for us to fax. She was on the phone with Neurology at this time and asked me to call back in five minutes and the phone call ended there. I will attempt to call the patient back in a couple of minutes.

## 2025-06-20 NOTE — TELEPHONE ENCOUNTER
OKAY FOR HUB TO RELAY    I tried calling and speaking with the patient but did not get an answer. I left a message letting her know I was just calling back and apologized if it was to soon but that our office was going to close at three and I wanted to try and talk with her before then. If she calls back:    From when we talked it sounded like you were saying the assistance program was needing a copy of your social security. I am assuming this is like proof of income? If this is what they are needing we can send it over for you if you bring it into the office. Just make sure when you drop it off to mention toney knows what this is about so no one is confused.    If this is not what was going on please explain and ill help however I can/ call back if needed.

## 2025-06-21 ENCOUNTER — HOSPITAL ENCOUNTER (INPATIENT)
Facility: HOSPITAL | Age: 70
LOS: 2 days | Discharge: HOME | End: 2025-06-23
Attending: EMERGENCY MEDICINE | Admitting: STUDENT IN AN ORGANIZED HEALTH CARE EDUCATION/TRAINING PROGRAM
Payer: MEDICARE

## 2025-06-21 ENCOUNTER — APPOINTMENT (OUTPATIENT)
Dept: GENERAL RADIOLOGY | Facility: HOSPITAL | Age: 70
End: 2025-06-21
Payer: MEDICARE

## 2025-06-21 ENCOUNTER — APPOINTMENT (OUTPATIENT)
Dept: CT IMAGING | Facility: HOSPITAL | Age: 70
End: 2025-06-21
Payer: MEDICARE

## 2025-06-21 DIAGNOSIS — E16.2 HYPOGLYCEMIA: ICD-10-CM

## 2025-06-21 DIAGNOSIS — E11.9 TYPE 2 DIABETES MELLITUS WITHOUT COMPLICATION, WITHOUT LONG-TERM CURRENT USE OF INSULIN: Chronic | ICD-10-CM

## 2025-06-21 DIAGNOSIS — R55 SYNCOPE, UNSPECIFIED SYNCOPE TYPE: Primary | ICD-10-CM

## 2025-06-21 DIAGNOSIS — A41.9 SEPSIS WITHOUT ACUTE ORGAN DYSFUNCTION, DUE TO UNSPECIFIED ORGANISM: ICD-10-CM

## 2025-06-21 LAB
ALBUMIN SERPL-MCNC: 4.3 G/DL (ref 3.5–5.2)
ALBUMIN/GLOB SERPL: 1.5 G/DL
ALP SERPL-CCNC: 91 U/L (ref 39–117)
ALT SERPL W P-5'-P-CCNC: 11 U/L (ref 1–33)
ANION GAP SERPL CALCULATED.3IONS-SCNC: 15.1 MMOL/L (ref 5–15)
AST SERPL-CCNC: 15 U/L (ref 1–32)
BASOPHILS # BLD AUTO: 0.1 10*3/MM3 (ref 0–0.2)
BASOPHILS NFR BLD AUTO: 0.8 % (ref 0–1.5)
BILIRUB SERPL-MCNC: 0.4 MG/DL (ref 0–1.2)
BILIRUB UR QL STRIP: NEGATIVE
BUN SERPL-MCNC: 23 MG/DL (ref 8–23)
BUN/CREAT SERPL: 26.1 (ref 7–25)
CALCIUM SPEC-SCNC: 10.6 MG/DL (ref 8.6–10.5)
CHLORIDE SERPL-SCNC: 105 MMOL/L (ref 98–107)
CLARITY UR: CLEAR
CO2 SERPL-SCNC: 23.9 MMOL/L (ref 22–29)
COLOR UR: YELLOW
CREAT SERPL-MCNC: 0.88 MG/DL (ref 0.57–1)
D-LACTATE SERPL-SCNC: 3.9 MMOL/L (ref 0.5–2)
DEPRECATED RDW RBC AUTO: 43.1 FL (ref 37–54)
EGFRCR SERPLBLD CKD-EPI 2021: 70.4 ML/MIN/1.73
EOSINOPHIL # BLD AUTO: 0.45 10*3/MM3 (ref 0–0.4)
EOSINOPHIL NFR BLD AUTO: 3.4 % (ref 0.3–6.2)
ERYTHROCYTE [DISTWIDTH] IN BLOOD BY AUTOMATED COUNT: 12.7 % (ref 12.3–15.4)
GLOBULIN UR ELPH-MCNC: 2.9 GM/DL
GLUCOSE BLDC GLUCOMTR-MCNC: 120 MG/DL (ref 70–130)
GLUCOSE BLDC GLUCOMTR-MCNC: 151 MG/DL (ref 70–130)
GLUCOSE BLDC GLUCOMTR-MCNC: 157 MG/DL (ref 70–130)
GLUCOSE BLDC GLUCOMTR-MCNC: 75 MG/DL (ref 70–130)
GLUCOSE BLDC GLUCOMTR-MCNC: 79 MG/DL (ref 70–130)
GLUCOSE BLDC GLUCOMTR-MCNC: 84 MG/DL (ref 70–130)
GLUCOSE SERPL-MCNC: 44 MG/DL (ref 65–99)
GLUCOSE UR STRIP-MCNC: ABNORMAL MG/DL
HBA1C MFR BLD: 6.6 % (ref 4.8–5.6)
HCT VFR BLD AUTO: 45.2 % (ref 34–46.6)
HGB BLD-MCNC: 14.6 G/DL (ref 12–15.9)
HGB UR QL STRIP.AUTO: NEGATIVE
IMM GRANULOCYTES # BLD AUTO: 0.09 10*3/MM3 (ref 0–0.05)
IMM GRANULOCYTES NFR BLD AUTO: 0.7 % (ref 0–0.5)
KETONES UR QL STRIP: NEGATIVE
LEUKOCYTE ESTERASE UR QL STRIP.AUTO: NEGATIVE
LYMPHOCYTES # BLD AUTO: 5.57 10*3/MM3 (ref 0.7–3.1)
LYMPHOCYTES NFR BLD AUTO: 41.8 % (ref 19.6–45.3)
MCH RBC QN AUTO: 30 PG (ref 26.6–33)
MCHC RBC AUTO-ENTMCNC: 32.3 G/DL (ref 31.5–35.7)
MCV RBC AUTO: 93 FL (ref 79–97)
MONOCYTES # BLD AUTO: 0.94 10*3/MM3 (ref 0.1–0.9)
MONOCYTES NFR BLD AUTO: 7.1 % (ref 5–12)
NEUTROPHILS NFR BLD AUTO: 46.2 % (ref 42.7–76)
NEUTROPHILS NFR BLD AUTO: 6.16 10*3/MM3 (ref 1.7–7)
NITRITE UR QL STRIP: NEGATIVE
PH UR STRIP.AUTO: <=5 [PH] (ref 5–8)
PLATELET # BLD AUTO: 334 10*3/MM3 (ref 140–450)
PMV BLD AUTO: 9.7 FL (ref 6–12)
POTASSIUM SERPL-SCNC: 3.1 MMOL/L (ref 3.5–5.2)
PROT SERPL-MCNC: 7.2 G/DL (ref 6–8.5)
PROT UR QL STRIP: NEGATIVE
QT INTERVAL: 308 MS
QTC INTERVAL: 372 MS
RBC # BLD AUTO: 4.86 10*6/MM3 (ref 3.77–5.28)
SODIUM SERPL-SCNC: 144 MMOL/L (ref 136–145)
SP GR UR STRIP: 1.02 (ref 1–1.03)
TROPONIN T SERPL HS-MCNC: 10 NG/L
UROBILINOGEN UR QL STRIP: ABNORMAL
WBC NRBC COR # BLD AUTO: 13.31 10*3/MM3 (ref 3.4–10.8)

## 2025-06-21 PROCEDURE — 83036 HEMOGLOBIN GLYCOSYLATED A1C: CPT | Performed by: STUDENT IN AN ORGANIZED HEALTH CARE EDUCATION/TRAINING PROGRAM

## 2025-06-21 PROCEDURE — 71045 X-RAY EXAM CHEST 1 VIEW: CPT

## 2025-06-21 PROCEDURE — 81003 URINALYSIS AUTO W/O SCOPE: CPT | Performed by: EMERGENCY MEDICINE

## 2025-06-21 PROCEDURE — 25810000003 SEPSIS FLUID NS 0.9 % SOLUTION: Performed by: EMERGENCY MEDICINE

## 2025-06-21 PROCEDURE — 36415 COLL VENOUS BLD VENIPUNCTURE: CPT

## 2025-06-21 PROCEDURE — 84484 ASSAY OF TROPONIN QUANT: CPT | Performed by: EMERGENCY MEDICINE

## 2025-06-21 PROCEDURE — 25810000003 DEXTROSE 5 % AND SODIUM CHLORIDE 0.9 % 5-0.9 % SOLUTION: Performed by: EMERGENCY MEDICINE

## 2025-06-21 PROCEDURE — 70450 CT HEAD/BRAIN W/O DYE: CPT

## 2025-06-21 PROCEDURE — 83605 ASSAY OF LACTIC ACID: CPT | Performed by: EMERGENCY MEDICINE

## 2025-06-21 PROCEDURE — 25810000003 SODIUM CHLORIDE 0.9 % SOLUTION: Performed by: EMERGENCY MEDICINE

## 2025-06-21 PROCEDURE — 85025 COMPLETE CBC W/AUTO DIFF WBC: CPT | Performed by: EMERGENCY MEDICINE

## 2025-06-21 PROCEDURE — 82948 REAGENT STRIP/BLOOD GLUCOSE: CPT

## 2025-06-21 PROCEDURE — 93010 ELECTROCARDIOGRAM REPORT: CPT | Performed by: INTERNAL MEDICINE

## 2025-06-21 PROCEDURE — 80053 COMPREHEN METABOLIC PANEL: CPT | Performed by: EMERGENCY MEDICINE

## 2025-06-21 PROCEDURE — 99291 CRITICAL CARE FIRST HOUR: CPT

## 2025-06-21 PROCEDURE — 25010000002 PIPERACILLIN SOD-TAZOBACTAM PER 1 G: Performed by: EMERGENCY MEDICINE

## 2025-06-21 PROCEDURE — 93005 ELECTROCARDIOGRAM TRACING: CPT | Performed by: EMERGENCY MEDICINE

## 2025-06-21 RX ORDER — SODIUM CHLORIDE 0.9 % (FLUSH) 0.9 %
10 SYRINGE (ML) INJECTION AS NEEDED
Status: DISCONTINUED | OUTPATIENT
Start: 2025-06-21 | End: 2025-06-23 | Stop reason: HOSPADM

## 2025-06-21 RX ORDER — DEXTROSE MONOHYDRATE 25 G/50ML
25 INJECTION, SOLUTION INTRAVENOUS ONCE
Status: COMPLETED | OUTPATIENT
Start: 2025-06-21 | End: 2025-06-21

## 2025-06-21 RX ORDER — DEXTROSE MONOHYDRATE AND SODIUM CHLORIDE 5; .9 G/100ML; G/100ML
100 INJECTION, SOLUTION INTRAVENOUS CONTINUOUS
Status: DISCONTINUED | OUTPATIENT
Start: 2025-06-21 | End: 2025-06-22

## 2025-06-21 RX ORDER — DEXTROSE MONOHYDRATE 25 G/50ML
INJECTION, SOLUTION INTRAVENOUS
Status: COMPLETED
Start: 2025-06-21 | End: 2025-06-21

## 2025-06-21 RX ADMIN — DEXTROSE MONOHYDRATE 25 G: 25 INJECTION, SOLUTION INTRAVENOUS at 14:39

## 2025-06-21 RX ADMIN — DEXTROSE MONOHYDRATE 25 G: 25 INJECTION, SOLUTION INTRAVENOUS at 13:57

## 2025-06-21 RX ADMIN — SODIUM CHLORIDE 1478 ML: 9 INJECTION, SOLUTION INTRAVENOUS at 15:28

## 2025-06-21 RX ADMIN — SODIUM CHLORIDE 1000 ML: 9 INJECTION, SOLUTION INTRAVENOUS at 14:15

## 2025-06-21 RX ADMIN — PIPERACILLIN AND TAZOBACTAM 4.5 G: 4; .5 INJECTION, POWDER, FOR SOLUTION INTRAVENOUS at 18:08

## 2025-06-21 RX ADMIN — DEXTROSE AND SODIUM CHLORIDE 100 ML/HR: 5; 900 INJECTION, SOLUTION INTRAVENOUS at 18:08

## 2025-06-21 NOTE — ED PROVIDER NOTES
" EMERGENCY DEPARTMENT MD ATTESTATION NOTE    Room Number:  12/12  PCP: Efraín Rebollar MD  Independent Historians: {Historian:58300::\"Patient\"}    HPI:  {A complete HPI/ROS/PMH/PSH/SH/FH are unobtainable due to:18228::\"None\"}    Context: Marina Hugo is a 69 y.o. female with a medical history of *** who presents to the ED c/o acute ***        Review of prior external notes (non-ED) -and- Review of prior external test results outside of this encounter: {EM_OldRecords:79503::\"***\"}    Prescription drug monitoring program review:     {BRYCE:01043::\"N/A\"}      PHYSICAL EXAM    I have reviewed the triage vital signs and nursing notes.    ED Triage Vitals [06/21/25 1403]   Temp Heart Rate Resp BP SpO2   97.4 °F (36.3 °C) 91 18 (!) 82/51 92 %      Temp src Heart Rate Source Patient Position BP Location FiO2 (%)   -- -- -- -- --       Physical Exam  GENERAL: ***alert, no acute distress  SKIN: Warm, dry  HENT: Normocephalic, atraumatic  EYES: no scleral icterus  CV: regular rhythm, regular rate  RESPIRATORY: normal effort, lungs clear  ABDOMEN: soft, nontender, nondistended  MUSCULOSKELETAL: no deformity  NEURO: alert, moves all extremities, follows commands        {PPE Statement:90899}    MEDICATIONS GIVEN IN ER  Medications   sodium chloride 0.9 % flush 10 mL (has no administration in time range)   dextrose (D50W) (25 g/50 mL) IV injection 25 g (25 g Intravenous Given 6/21/25 1357)   sodium chloride 0.9 % bolus 1,000 mL (1,000 mL Intravenous New Bag 6/21/25 1415)   dextrose (D50W) (25 g/50 mL) IV injection 25 g (25 g Intravenous Given 6/21/25 1439)         ORDERS PLACED DURING THIS VISIT:  Orders Placed This Encounter   Procedures   • XR Chest 1 View   • CT Head Without Contrast   • Comprehensive Metabolic Panel   • CBC Auto Differential   • Lactic Acid, Plasma   • High Sensitivity Troponin T   • POC Glucose STAT   • POC Glucose Once   • POC Glucose Once   • ECG 12 Lead Syncope   • Insert Peripheral IV   • CBC & " Differential         PROCEDURES  Procedures      {Critical Care Note:95756}      PROGRESS, DATA ANALYSIS, CONSULTS, AND MEDICAL DECISION MAKING  All labs have been independently interpreted by me.  All radiology studies have been reviewed by me. All EKG's have been independently viewed and interpreted by me.  Discussion below represents my analysis of pertinent findings related to patient's condition, differential diagnosis, treatment plan and final disposition.    Differential diagnosis includes but is not limited to ***.    Clinical Scores:                                     ED Course as of 06/21/25 1450   Sat Jun 21, 2025   1403 The patient is awake alert and oriented.  She has had D50.  She denies any source of pain.  She remembers the events up to the point that she lost consciousness.  She was feeling like her blood sugar was low prior to the event.  She denies headache. [TR]   1409 BP(!): 82/51 [TR]   1429 Hemoglobin: 14.6 [TR]   1429 Glucose(!): 157 [TR]   1429 EKG PROCEDURE    EKG time: 1418  Rhythm/Rate: Normal sinus, rate 88  P waves and KY: Normal P, normal KY  QRS, axis: Narrow QRS, normal axis  ST and T waves: No acute    Independently Interpreted by me  Not significantly changed compared to prior 6/3/2021   [TR]   1447 Glucose: 84 [TR]   1447 Creatinine: 0.88 [TR]   1447 XR Chest 1 View  My independent interpretation of the imaging study is no dense consolidation [TR]   1449 HS Troponin T: 10 [TR]      ED Course User Index  [TR] Ryan You MD       MDM: ***      COMPLEXITY OF CARE  {Admission Statement:83990}    Please note that portions of this document were completed with a voice recognition program.    Note Disclaimer: At [facility], we believe that sharing information builds trust and better relationships. You are receiving this note because you recently visited [facility]. It is possible you will see health information before a provider has talked with you about it. This kind of  information can be easy to misunderstand. To help you fully understand what it means for your health, we urge you to discuss this note with your provider.

## 2025-06-21 NOTE — ED PROVIDER NOTES
EMERGENCY DEPARTMENT ENCOUNTER  Room Number:  3113/1  PCP: Efraín Rebollar MD  Independent Historians: Patient and EMS      HPI:  Chief Complaint: had concerns including Hypoglycemia and Fall.   A complete HPI/ROS/PMH/PSH/SH/FH are unobtainable due to: Altered Mental Status    Context: Marina Hugo is a 69 y.o. female with a medical history of hypertension, asthma, diabetes who presents to the ED c/o acute syncopal episode.  EMS reports they were called for a syncopal episode.  They found the patient hypoglycemic.  They were not able to obtain IV access so they gave IM glucagon.  She was brought to room 12 upon arrival.  IV access was obtained and she was given IV glucose.  She had improvement of her mental status.  She states that she took her insulin and was heading to lunch and on the way to lunch she felt presyncopal.  She does not remember anything after that.  EMS states she struck her head.  She is not on blood thinners.  She denies headache.  She denies abdominal pain.  She denies nausea and vomiting.      Review of prior external notes (non-ED) -and- Review of prior external test results outside of this encounter: Laboratory evaluation 7/31/2024 shows BMP with an elevated glucose of 186    Prescription drug monitoring program review:         PAST MEDICAL HISTORY  Active Ambulatory Problems     Diagnosis Date Noted    Hypertension 01/13/2016    Asthma 01/13/2016    Diabetes mellitus 01/13/2016    Hyperlipidemia 01/13/2016    Irritable bowel syndrome 01/13/2016    Osteoarthritis 01/13/2016    Chronic cough 04/25/2016    Polyp of colon 04/30/2021    Osteoarthritis of both knees 08/02/2022    Medicare annual wellness visit, subsequent 12/05/2024    Body mass index (BMI) of 45.0 to 49.9 in adult 12/05/2024    COVID-19 virus infection 02/06/2025    Allergic rhinitis 03/26/2025     Resolved Ambulatory Problems     Diagnosis Date Noted    No Resolved Ambulatory Problems     Past Medical History:   Diagnosis  Date    Anemia     Anesthesia complication     Arthritis     High cholesterol     Obesity     Rotator cuff tear     Routine eye exam due    Sleep apnea          PAST SURGICAL HISTORY  Past Surgical History:   Procedure Laterality Date    CARPAL TUNNEL RELEASE Right      SECTION      COLONOSCOPY      LAPAROSCOPIC GASTRIC BANDING      SHOULDER ROTATOR CUFF REPAIR Right 2021    Procedure: RIGHT SHOULDER ARTHROSCOPY WITH ACROMIOPLASTY, ROTATOR CUFF AND LABRAL DEBRIDEMENT;  Surgeon: Raj Walker MD;  [place] [facility] [place] [facility] [place]: [place]opedics;  Laterality: Right;         FAMILY HISTORY  Family History   Problem Relation Age of Onset    Anxiety disorder Mother     Colon cancer Mother     Depression Mother     Diabetes Mother     Hypertension Mother     Nephrolithiasis Mother     Macular degeneration Mother     Diabetes Father     Heart disease Father     Hypertension Father     Malig Hyperthermia Neg Hx          SOCIAL HISTORY  Social History     Socioeconomic History    Marital status:    Tobacco Use    Smoking status: Never     Passive exposure: Never    Smokeless tobacco: Never   Vaping Use    Vaping status: Never Used   Substance and Sexual Activity    Alcohol use: No     Comment: Caffeine use: 2-3 cups daily    Drug use: No    Sexual activity: Defer       Chronic or social conditions impacting patient care (Social Determinants of Health):  Social Drivers of Health     Tobacco Use: Low Risk  (2025)    Patient History     Smoking Tobacco Use: Never     Smokeless Tobacco Use: Never     Passive Exposure: Never   Alcohol Use: Not At Risk (2025)    AUDIT-C     Frequency of Alcohol Consumption: Monthly or less     Average Number of Drinks: 1 or 2     Frequency of Binge Drinking: Never   Financial Resource Strain: Not on file   Food Insecurity: Not on file   Transportation Needs: Not on file   Physical Activity: Not on file   Stress: Not on file   Social Connections: Not on file    Interpersonal Safety: Not At Risk (6/21/2025)    Abuse Screen     Unsafe at Home or Work/School: no     Feels Threatened by Someone?: no     Does Anyone Keep You from Contacting Others or Doint Things Outside the Home?: no     Physical Sign of Abuse Present: no   Depression: Not at risk (12/5/2024)    PHQ-2     PHQ-2 Score: 0   Housing Stability: Unknown (6/22/2025)    Housing Stability     Current Living Arrangements: home     Potentially Unsafe Housing Conditions: Not on file   Utilities: Not on file   Health Literacy: Not on file   Employment: Not on file   Disabilities: Not At Risk (6/22/2025)    Disabilities     Concentrating, Remembering, or Making Decisions Difficulty: no     Doing Errands Independently Difficulty: no       ALLERGIES  Promethazine      REVIEW OF SYSTEMS  Review of Systems  Included in HPI  All systems reviewed and negative except for those discussed in HPI.      PHYSICAL EXAM    I have reviewed the triage vital signs and nursing notes.    ED Triage Vitals [06/21/25 1403]   Temp Heart Rate Resp BP SpO2   97.4 °F (36.3 °C) 91 18 (!) 82/51 92 %      Temp src Heart Rate Source Patient Position BP Location FiO2 (%)   -- -- -- -- --       Physical Exam  GENERAL: Awake, alert, confused, repetitive questioning.  No external signs of trauma  SKIN: Warm, dry  HENT: Normocephalic, atraumatic  EYES: no scleral icterus  CV: regular rhythm, regular rate  RESPIRATORY: normal effort, lungs clear  ABDOMEN: soft, nontender, nondistended  MUSCULOSKELETAL: no deformity  NEURO: alert, moves all extremities, follows commands            LAB RESULTS  Recent Results (from the past 24 hours)   POC Glucose Once    Collection Time: 06/21/25 10:56 PM    Specimen: Blood   Result Value Ref Range    Glucose 120 70 - 130 mg/dL   High Sensitivity Troponin T 1Hr    Collection Time: 06/22/25  2:31 AM    Specimen: Blood   Result Value Ref Range    HS Troponin T 14 (H) <14 ng/L    Troponin T Numeric Delta 4 (C) Abnormal if >/=3  ng/L   Procalcitonin    Collection Time: 06/22/25  2:31 AM    Specimen: Blood   Result Value Ref Range    Procalcitonin 0.29 (H) 0.00 - 0.25 ng/mL   POC Glucose Once    Collection Time: 06/22/25  3:39 AM    Specimen: Blood   Result Value Ref Range    Glucose 126 70 - 130 mg/dL   STAT Lactic Acid, Reflex    Collection Time: 06/22/25  5:02 AM    Specimen: Blood   Result Value Ref Range    Lactate 0.9 0.5 - 2.0 mmol/L   TSH    Collection Time: 06/22/25  5:02 AM    Specimen: Blood   Result Value Ref Range    TSH 2.230 0.270 - 4.200 uIU/mL   Basic Metabolic Panel    Collection Time: 06/22/25  5:02 AM    Specimen: Blood   Result Value Ref Range    Glucose 132 (H) 65 - 99 mg/dL    BUN 15.0 8.0 - 23.0 mg/dL    Creatinine 0.70 0.57 - 1.00 mg/dL    Sodium 140 136 - 145 mmol/L    Potassium 3.7 3.5 - 5.2 mmol/L    Chloride 108 (H) 98 - 107 mmol/L    CO2 22.0 22.0 - 29.0 mmol/L    Calcium 9.4 8.6 - 10.5 mg/dL    BUN/Creatinine Ratio 21.4 7.0 - 25.0    Anion Gap 10.0 5.0 - 15.0 mmol/L    eGFR 92.6 >60.0 mL/min/1.73   CBC (No Diff)    Collection Time: 06/22/25  5:03 AM    Specimen: Blood   Result Value Ref Range    WBC 8.83 3.40 - 10.80 10*3/mm3    RBC 4.29 3.77 - 5.28 10*6/mm3    Hemoglobin 13.4 12.0 - 15.9 g/dL    Hematocrit 39.3 34.0 - 46.6 %    MCV 91.6 79.0 - 97.0 fL    MCH 31.2 26.6 - 33.0 pg    MCHC 34.1 31.5 - 35.7 g/dL    RDW 13.0 12.3 - 15.4 %    RDW-SD 43.7 37.0 - 54.0 fl    MPV 9.5 6.0 - 12.0 fL    Platelets 253 140 - 450 10*3/mm3   POC Glucose Once    Collection Time: 06/22/25  6:52 AM    Specimen: Blood   Result Value Ref Range    Glucose 118 70 - 130 mg/dL   POC Glucose Once    Collection Time: 06/22/25 10:12 AM    Specimen: Blood   Result Value Ref Range    Glucose 125 70 - 130 mg/dL   POC Glucose Once    Collection Time: 06/22/25  3:32 PM    Specimen: Blood   Result Value Ref Range    Glucose 116 70 - 130 mg/dL         RADIOLOGY  No Radiology Exams Resulted Within Past 24 Hours        MEDICATIONS GIVEN IN  ER  Medications   sodium chloride 0.9 % flush 10 mL (has no administration in time range)   dextrose 5 % and sodium chloride 0.9 % infusion (0 mL/hr Intravenous Stopped 6/22/25 0757)   nitroglycerin (NITROSTAT) SL tablet 0.4 mg (has no administration in time range)   albuterol (PROVENTIL) nebulizer solution 0.083% 2.5 mg/3mL (has no administration in time range)   atorvastatin (LIPITOR) tablet 20 mg (20 mg Oral Given 6/22/25 0958)   benzonatate (TESSALON) capsule 200 mg (has no administration in time range)   cetirizine (zyrTEC) tablet 10 mg (has no administration in time range)   dicyclomine (BENTYL) capsule 10 mg (10 mg Oral Given 6/22/25 0958)   acetaminophen (TYLENOL) tablet 1,000 mg (1,000 mg Oral Given 6/22/25 1115)   cyclobenzaprine (FLEXERIL) tablet 5 mg (5 mg Oral Given 6/22/25 1115)   budesonide-formoterol (SYMBICORT) 160-4.5 MCG/ACT inhaler 2 puff (2 puffs Inhalation Not Given 6/22/25 1157)     And   revefenacin (YUPELRI) nebulizer solution 175 mcg (175 mcg Nebulization Not Given 6/22/25 1157)   valsartan (DIOVAN) tablet 160 mg ( Oral Dose Auto Held 6/30/25 0900)     And   hydroCHLOROthiazide tablet 12.5 mg ( Oral Dose Auto Held 6/30/25 0900)   celecoxib (CeleBREX) capsule 100 mg (100 mg Oral Given 6/22/25 1310)   dextrose (D50W) (25 g/50 mL) IV injection 25 g (25 g Intravenous Given 6/21/25 1357)   sodium chloride 0.9 % bolus 1,000 mL (0 mL Intravenous Stopped 6/21/25 1528)   dextrose (D50W) (25 g/50 mL) IV injection 25 g (25 g Intravenous Given 6/21/25 1439)   sepsis fluid NS 0.9 % bolus 2,478 mL (1,478 mL Intravenous New Bag 6/21/25 1528)   piperacillin-tazobactam (ZOSYN) 4.5 g IVPB in 100 mL NS MBP (CD) (0 g Intravenous Stopped 6/21/25 1939)         ORDERS PLACED DURING THIS VISIT:  Orders Placed This Encounter   Procedures    Critical Care    Blood Culture - Blood,    Blood Culture - Blood,    XR Chest 1 View    CT Head Without Contrast    Comprehensive Metabolic Panel    CBC Auto Differential     Lactic Acid, Plasma    High Sensitivity Troponin T    High Sensitivity Troponin T 1Hr    STAT Lactic Acid, Reflex    Urinalysis With Culture If Indicated - Urine, Clean Catch    Procalcitonin    Hemoglobin A1c    Basic Metabolic Panel    Magnesium    Phosphorus    CBC (No Diff)    TSH    Basic Metabolic Panel    CBC (No Diff)    Diet: Cardiac, Diabetic; Healthy Heart (2-3 Na+); Consistent Carbohydrate; Fluid Consistency: Thin (IDDSI 0)    Place Sequential Compression Device    Maintain Sequential Compression Device    Maintain IV Access    Telemetry - Place Orders & Notify Provider of Results When Patient Experiences Acute Chest Pain, Dysrhythmia or Respiratory Distress    May Be Off Telemetry for Tests    Code Status and Medical Interventions: CPR (Attempt to Resuscitate); Full Support    [facility] (on-call MD unless specified) Details    Inpatient Diabetes Educator Consult    Inpatient Diabetes Educator Consult    PT Consult: Eval & Treat Functional Mobility Below Baseline    POC Glucose STAT    POC Glucose Once    POC Glucose Once    POC Glucose Once    POC Glucose Finger Once    POC Glucose Once    POC Glucose Finger Once    POC Glucose Once    POC Glucose Once    POC Glucose Once    POC Glucose Once    POC Glucose Once    POC Glucose Once    ECG 12 Lead Syncope    Telemetry Scan    Insert Peripheral IV    Inpatient Admission    CBC & Differential         OUTPATIENT MEDICATION MANAGEMENT:  Current Facility-Administered Medications Ordered in Epic   Medication Dose Route Frequency Provider Last Rate Last Admin    acetaminophen (TYLENOL) tablet 1,000 mg  1,000 mg Oral Q8H PRN Mumtaz Molina MD   1,000 mg at 06/22/25 1115    albuterol (PROVENTIL) nebulizer solution 0.083% 2.5 mg/3mL  2.5 mg Nebulization Q6H PRN Peyton Martinez MD        atorvastatin (LIPITOR) tablet 20 mg  20 mg Oral Daily Peyton Martinez MD   20 mg at 06/22/25 0958    benzonatate (TESSALON) capsule 200 mg  200 mg Oral TID PRN Juan  Peyton MELO MD        budesonide-formoterol (SYMBICORT) 160-4.5 MCG/ACT inhaler 2 puff  2 puff Inhalation BID - RT Mumtaz Molina MD        And    revefenacin (YUPELRI) nebulizer solution 175 mcg  175 mcg Nebulization Daily - RT Mumtaz Molina MD        celecoxib (CeleBREX) capsule 100 mg  100 mg Oral Q12H Mumtaz Molina MD   100 mg at 06/22/25 1310    cetirizine (zyrTEC) tablet 10 mg  10 mg Oral Nightly Peyton Martinez MD        cyclobenzaprine (FLEXERIL) tablet 5 mg  5 mg Oral BID PRN Mumtaz Molina MD   5 mg at 06/22/25 1115    dicyclomine (BENTYL) capsule 10 mg  10 mg Oral TID PRN Peyton Martinez MD   10 mg at 06/22/25 0958    [Held by provider] valsartan (DIOVAN) tablet 160 mg  160 mg Oral Q24H Mumtaz Molina MD        And    [Held by provider] hydroCHLOROthiazide tablet 12.5 mg  12.5 mg Oral Q24H Mumtaz Molina MD        nitroglycerin (NITROSTAT) SL tablet 0.4 mg  0.4 mg Sublingual Q5 Min PRN Peyton Martinez MD        sodium chloride 0.9 % flush 10 mL  10 mL Intravenous PRN Ryan You MD         No current Epic-ordered outpatient medications on file.         PROCEDURES  Critical Care    Performed by: Ryan You MD  Authorized by: Ryan You MD    Critical care provider statement:     Critical care time (minutes):  32    Critical care time was exclusive of:  Separately billable procedures and treating other patients    Critical care was necessary to treat or prevent imminent or life-threatening deterioration of the following conditions:  Metabolic crisis    Critical care was time spent personally by me on the following activities:  Blood draw for specimens, development of treatment plan with patient or surrogate, evaluation of patient's response to treatment, examination of patient, obtaining history from patient or surrogate, ordering and performing treatments and interventions, ordering and review of laboratory studies, ordering  and review of radiographic studies, pulse oximetry, re-evaluation of patient's condition, review of old charts and discussions with consultants              PROGRESS, DATA ANALYSIS, CONSULTS, AND MEDICAL DECISION MAKING  All labs have been independently interpreted by me.  All radiology studies have been reviewed by me. All EKG's have been independently viewed and interpreted by me.  Discussion below represents my analysis of pertinent findings related to patient's condition, differential diagnosis, treatment plan and final disposition.    Differential diagnosis includes but is not limited to hypoglycemia, renal failure, dehydration, insulin overdose, sepsis, acute coronary syndrome, acute aortic syndrome, PE, arrhythmia.    Clinical Scores:                                       ED Course as of 06/22/25 1915   Sat Jun 21, 2025   1403 The patient is awake alert and oriented.  She has had D50.  She denies any source of pain.  She remembers the events up to the point that she lost consciousness.  She was feeling like her blood sugar was low prior to the event.  She denies headache. [TR]   1409 BP(!): 82/51 [TR]   1429 Hemoglobin: 14.6 [TR]   1429 Glucose(!): 157 [TR]   1429 EKG PROCEDURE    EKG time: 1418  Rhythm/Rate: Normal sinus, rate 88  P waves and CT: Normal P, normal CT  QRS, axis: Narrow QRS, normal axis  ST and T waves: No acute    Independently Interpreted by me  Not significantly changed compared to prior 6/3/2021   [TR]   1447 Glucose: 84 [TR]   1447 Creatinine: 0.88 [TR]   1447 XR Chest 1 View  My independent interpretation of the imaging study is no dense consolidation [TR]   1449 HS Troponin T: 10 [TR]   1456 BP(!): 85/53 [TR]   1457 Care transferred to Dr. Carlson pending laboratory evaluation, reevaluation of her blood pressure, disposition. [TR]   1506 Glucose(!): 151 [TR]   1508 Lactate(!!): 3.9 [TR]   1515 I updated the patient and family at the bedside.  She has an elevated lactic acid, syncopal  episode, hypoglycemia, and persistent hypotension.  I have ordered sepsis fluid bolus as well as IV antibiotics.  She will require admission today.  She is agreeable.  We are pending CT imaging as well as urinalysis. [TR]   1807 /87, HR 80s  Awaiting urine  Accu check 79. Plan D5NS at 100/hr. Cont q1h BS. [AR]   1926 Accu check 75. Pt interesting in eating. I discussed all results with patient. Plan to continue D5NS and feed patient. Cont broad antibiotic for undifferentiated sepsis. Pt denies any tick bites, recent travel, or any open wound/skin lesions. [AR]   2025 I discussed patient's case with  on for A who was amenable to admitting the patient for further care [AR]      ED Course User Index  [AR] Estelita Bella MD  [TR] Ryan You MD             AS OF 19:15 EDT VITALS:    BP - 101/61  HR - 83  TEMP - 98.6 °F (37 °C) (Oral)  O2 SATS - 94%    COMPLEXITY OF CARE        DIAGNOSIS  Final diagnoses:   Syncope, unspecified syncope type   Hypoglycemia   Sepsis without acute organ dysfunction, due to unspecified organism         DISPOSITION  ED Disposition       ED Disposition   Decision to Admit    Condition   --    Comment   Level of Care: Telemetry [5]   Diagnosis: Syncope and collapse [780.2.ICD-9-CM]   Admitting Physician: TRAM AMADOR [[id number]]   Attending Physician: TRAM AMADOR [[id number]]   Certification: I Certify That Inpatient Hospital Services Are Medically Necessary For Greater Than 2 Midnights                  Please note that portions of this document were completed with a voice recognition program.    Note Disclaimer: At [facility], we believe that sharing information builds trust and better relationships. You are receiving this note because you recently visited [facility]. It is possible you will see health information before a provider has talked with you about it. This kind of information can be easy to misunderstand. To help you fully understand what  it means for your health, we urge you to discuss this note with your provider.         Ryan You MD  06/21/25 1503       Ryan You MD  06/21/25 1508       Ryan You MD  06/21/25 1510       Ryan You MD  06/21/25 1516       Ryan You MD  06/22/25 4995

## 2025-06-21 NOTE — ED TRIAGE NOTES
"Pt to ED from scene via [facility]. EMS called for syncopal episode. Per EMS pt was driving to lunch and starting feeling dizzy and faint. Pt ate a pack of club crackers and pulled over to have her spouse drive. When walking around the vehicle to get into the passenger side of the vehicle pt collapsed and hit head on concrete. Pt is diabetic and had taken her morning dose of 40 units of insulin prior to leaving the house. Pt had not had anything to eat this morning. EMS reports glucose reading of \"low.\" EMS reports pt was twitching, had snoring respirations and they were unable to obtain IV access pt was placed on nonrebreather and given 1mg IM glucagon. Pt glucose increased to 36 with ems and pt breathing and mentation improved. On arrival to ED pt alert with mild confusion, glucose 51 and pt on room air.    "

## 2025-06-22 LAB
ANION GAP SERPL CALCULATED.3IONS-SCNC: 10 MMOL/L (ref 5–15)
BUN SERPL-MCNC: 15 MG/DL (ref 8–23)
BUN/CREAT SERPL: 21.4 (ref 7–25)
CALCIUM SPEC-SCNC: 9.4 MG/DL (ref 8.6–10.5)
CHLORIDE SERPL-SCNC: 108 MMOL/L (ref 98–107)
CO2 SERPL-SCNC: 22 MMOL/L (ref 22–29)
CREAT SERPL-MCNC: 0.7 MG/DL (ref 0.57–1)
D-LACTATE SERPL-SCNC: 0.9 MMOL/L (ref 0.5–2)
DEPRECATED RDW RBC AUTO: 43.7 FL (ref 37–54)
EGFRCR SERPLBLD CKD-EPI 2021: 92.6 ML/MIN/1.73
ERYTHROCYTE [DISTWIDTH] IN BLOOD BY AUTOMATED COUNT: 13 % (ref 12.3–15.4)
GEN 5 1HR TROPONIN T REFLEX: 14 NG/L
GLUCOSE BLDC GLUCOMTR-MCNC: 116 MG/DL (ref 70–130)
GLUCOSE BLDC GLUCOMTR-MCNC: 118 MG/DL (ref 70–130)
GLUCOSE BLDC GLUCOMTR-MCNC: 125 MG/DL (ref 70–130)
GLUCOSE BLDC GLUCOMTR-MCNC: 126 MG/DL (ref 70–130)
GLUCOSE BLDC GLUCOMTR-MCNC: 157 MG/DL (ref 70–130)
GLUCOSE SERPL-MCNC: 132 MG/DL (ref 65–99)
HCT VFR BLD AUTO: 39.3 % (ref 34–46.6)
HGB BLD-MCNC: 13.4 G/DL (ref 12–15.9)
MCH RBC QN AUTO: 31.2 PG (ref 26.6–33)
MCHC RBC AUTO-ENTMCNC: 34.1 G/DL (ref 31.5–35.7)
MCV RBC AUTO: 91.6 FL (ref 79–97)
PLATELET # BLD AUTO: 253 10*3/MM3 (ref 140–450)
PMV BLD AUTO: 9.5 FL (ref 6–12)
POTASSIUM SERPL-SCNC: 3.7 MMOL/L (ref 3.5–5.2)
PROCALCITONIN SERPL-MCNC: 0.29 NG/ML (ref 0–0.25)
RBC # BLD AUTO: 4.29 10*6/MM3 (ref 3.77–5.28)
SODIUM SERPL-SCNC: 140 MMOL/L (ref 136–145)
TROPONIN T NUMERIC DELTA: 4 NG/L
TSH SERPL DL<=0.05 MIU/L-ACNC: 2.23 UIU/ML (ref 0.27–4.2)
WBC NRBC COR # BLD AUTO: 8.83 10*3/MM3 (ref 3.4–10.8)

## 2025-06-22 PROCEDURE — 84145 PROCALCITONIN (PCT): CPT | Performed by: STUDENT IN AN ORGANIZED HEALTH CARE EDUCATION/TRAINING PROGRAM

## 2025-06-22 PROCEDURE — 84443 ASSAY THYROID STIM HORMONE: CPT | Performed by: STUDENT IN AN ORGANIZED HEALTH CARE EDUCATION/TRAINING PROGRAM

## 2025-06-22 PROCEDURE — 97530 THERAPEUTIC ACTIVITIES: CPT

## 2025-06-22 PROCEDURE — 97161 PT EVAL LOW COMPLEX 20 MIN: CPT

## 2025-06-22 PROCEDURE — 97116 GAIT TRAINING THERAPY: CPT

## 2025-06-22 PROCEDURE — 25810000003 DEXTROSE 5 % AND SODIUM CHLORIDE 0.9 % 5-0.9 % SOLUTION: Performed by: STUDENT IN AN ORGANIZED HEALTH CARE EDUCATION/TRAINING PROGRAM

## 2025-06-22 PROCEDURE — 82948 REAGENT STRIP/BLOOD GLUCOSE: CPT

## 2025-06-22 PROCEDURE — 80048 BASIC METABOLIC PNL TOTAL CA: CPT | Performed by: NURSE PRACTITIONER

## 2025-06-22 PROCEDURE — 83605 ASSAY OF LACTIC ACID: CPT | Performed by: EMERGENCY MEDICINE

## 2025-06-22 PROCEDURE — 87040 BLOOD CULTURE FOR BACTERIA: CPT | Performed by: EMERGENCY MEDICINE

## 2025-06-22 PROCEDURE — 36415 COLL VENOUS BLD VENIPUNCTURE: CPT | Performed by: EMERGENCY MEDICINE

## 2025-06-22 PROCEDURE — 84484 ASSAY OF TROPONIN QUANT: CPT | Performed by: EMERGENCY MEDICINE

## 2025-06-22 PROCEDURE — 25010000002 CEFTRIAXONE PER 250 MG: Performed by: STUDENT IN AN ORGANIZED HEALTH CARE EDUCATION/TRAINING PROGRAM

## 2025-06-22 PROCEDURE — 85027 COMPLETE CBC AUTOMATED: CPT | Performed by: NURSE PRACTITIONER

## 2025-06-22 RX ORDER — DEXTROSE MONOHYDRATE AND SODIUM CHLORIDE 5; .9 G/100ML; G/100ML
100 INJECTION, SOLUTION INTRAVENOUS CONTINUOUS
Status: ACTIVE | OUTPATIENT
Start: 2025-06-22 | End: 2025-06-22

## 2025-06-22 RX ORDER — ALBUTEROL SULFATE 0.83 MG/ML
2.5 SOLUTION RESPIRATORY (INHALATION) EVERY 6 HOURS PRN
Status: DISCONTINUED | OUTPATIENT
Start: 2025-06-22 | End: 2025-06-23 | Stop reason: HOSPADM

## 2025-06-22 RX ORDER — HYDROCHLOROTHIAZIDE 12.5 MG/1
12.5 TABLET ORAL
Status: DISCONTINUED | OUTPATIENT
Start: 2025-06-22 | End: 2025-06-23 | Stop reason: HOSPADM

## 2025-06-22 RX ORDER — CELECOXIB 100 MG/1
100 CAPSULE ORAL EVERY 12 HOURS SCHEDULED
Status: DISCONTINUED | OUTPATIENT
Start: 2025-06-22 | End: 2025-06-23 | Stop reason: HOSPADM

## 2025-06-22 RX ORDER — ATORVASTATIN CALCIUM 20 MG/1
20 TABLET, FILM COATED ORAL DAILY
Status: DISCONTINUED | OUTPATIENT
Start: 2025-06-22 | End: 2025-06-23 | Stop reason: HOSPADM

## 2025-06-22 RX ORDER — DICYCLOMINE HYDROCHLORIDE 10 MG/1
10 CAPSULE ORAL 3 TIMES DAILY PRN
Status: DISCONTINUED | OUTPATIENT
Start: 2025-06-22 | End: 2025-06-23 | Stop reason: HOSPADM

## 2025-06-22 RX ORDER — CETIRIZINE HYDROCHLORIDE 10 MG/1
10 TABLET ORAL NIGHTLY
Status: DISCONTINUED | OUTPATIENT
Start: 2025-06-22 | End: 2025-06-23 | Stop reason: HOSPADM

## 2025-06-22 RX ORDER — ACETAMINOPHEN 500 MG
1000 TABLET ORAL EVERY 8 HOURS PRN
Status: DISCONTINUED | OUTPATIENT
Start: 2025-06-22 | End: 2025-06-23 | Stop reason: HOSPADM

## 2025-06-22 RX ORDER — NITROGLYCERIN 0.4 MG/1
0.4 TABLET SUBLINGUAL
Status: DISCONTINUED | OUTPATIENT
Start: 2025-06-22 | End: 2025-06-23 | Stop reason: HOSPADM

## 2025-06-22 RX ORDER — BENZONATATE 100 MG/1
200 CAPSULE ORAL 3 TIMES DAILY PRN
Status: DISCONTINUED | OUTPATIENT
Start: 2025-06-22 | End: 2025-06-23 | Stop reason: HOSPADM

## 2025-06-22 RX ORDER — CYCLOBENZAPRINE HCL 10 MG
5 TABLET ORAL 2 TIMES DAILY PRN
Status: DISCONTINUED | OUTPATIENT
Start: 2025-06-22 | End: 2025-06-23 | Stop reason: HOSPADM

## 2025-06-22 RX ORDER — BUDESONIDE AND FORMOTEROL FUMARATE DIHYDRATE 160; 4.5 UG/1; UG/1
2 AEROSOL RESPIRATORY (INHALATION)
Status: DISCONTINUED | OUTPATIENT
Start: 2025-06-22 | End: 2025-06-23 | Stop reason: HOSPADM

## 2025-06-22 RX ORDER — VALSARTAN 160 MG/1
160 TABLET ORAL
Status: DISCONTINUED | OUTPATIENT
Start: 2025-06-22 | End: 2025-06-23 | Stop reason: HOSPADM

## 2025-06-22 RX ADMIN — ACETAMINOPHEN 1000 MG: 500 TABLET, FILM COATED ORAL at 11:15

## 2025-06-22 RX ADMIN — ACETAMINOPHEN 1000 MG: 500 TABLET, FILM COATED ORAL at 20:57

## 2025-06-22 RX ADMIN — CYCLOBENZAPRINE HYDROCHLORIDE 5 MG: 10 TABLET, FILM COATED ORAL at 11:15

## 2025-06-22 RX ADMIN — CELECOXIB 100 MG: 100 CAPSULE ORAL at 13:10

## 2025-06-22 RX ADMIN — CELECOXIB 100 MG: 100 CAPSULE ORAL at 20:58

## 2025-06-22 RX ADMIN — CEFTRIAXONE SODIUM 2000 MG: 2 INJECTION, POWDER, FOR SOLUTION INTRAMUSCULAR; INTRAVENOUS at 05:14

## 2025-06-22 RX ADMIN — CETIRIZINE HYDROCHLORIDE 10 MG: 10 TABLET, FILM COATED ORAL at 20:58

## 2025-06-22 RX ADMIN — DEXTROSE AND SODIUM CHLORIDE 100 ML/HR: 5; 900 INJECTION, SOLUTION INTRAVENOUS at 04:25

## 2025-06-22 RX ADMIN — ATORVASTATIN CALCIUM 20 MG: 20 TABLET, FILM COATED ORAL at 09:58

## 2025-06-22 RX ADMIN — DICYCLOMINE HYDROCHLORIDE 10 MG: 10 CAPSULE ORAL at 09:58

## 2025-06-22 NOTE — PROGRESS NOTES
Name: Marina Hugo ADMIT: 2025   : 1955  PCP: Efraín Rebollar MD    MRN: [id number] LOS: 1 days   AGE/SEX: 69 y.o. female  ROOM: [facility]/     Subjective   Subjective   Lying in bed.  Feels good this morning.  No overnight problems.    Objective   Objective   Vital Signs  Temp:  [97.4 °F (36.3 °C)-99.2 °F (37.3 °C)] 99.2 °F (37.3 °C)  Heart Rate:  [77-92] 92  Resp:  [18] 18  BP: ()/(48-89) 106/62  SpO2:  [91 %-100 %] 94 %  on   ;   Device (Oxygen Therapy): room air  Body mass index is 50.07 kg/m².  Physical Exam  Constitutional:       General: She is not in acute distress.  Pulmonary:      Effort: Pulmonary effort is normal. No respiratory distress.      Breath sounds: No stridor.   Skin:     Coloration: Skin is not jaundiced.      Findings: No bruising.   Neurological:      General: No focal deficit present.      Mental Status: She is alert.   Psychiatric:         Mood and Affect: Mood normal.         Behavior: Behavior normal.         Results Review     I reviewed the patient's new clinical results.  Results from last 7 days   Lab Units 25  0503 25  1401   WBC 10*3/mm3 8.83 13.31*   HEMOGLOBIN g/dL 13.4 14.6   PLATELETS 10*3/mm3 253 334     Results from last 7 days   Lab Units 25  0502 25  1401   SODIUM mmol/L 140 144   POTASSIUM mmol/L 3.7 3.1*   CHLORIDE mmol/L 108* 105   CO2 mmol/L 22.0 23.9   BUN mg/dL 15.0 23.0   CREATININE mg/dL 0.70 0.88   GLUCOSE mg/dL 132* 44*   EGFR mL/min/1.73 92.6 70.4     Results from last 7 days   Lab Units 25  1401   ALBUMIN g/dL 4.3   BILIRUBIN mg/dL 0.4   ALK PHOS U/L 91   AST (SGOT) U/L 15   ALT (SGPT) U/L 11     Results from last 7 days   Lab Units 25  0502 25  1401   CALCIUM mg/dL 9.4 10.6*   ALBUMIN g/dL  --  4.3     Results from last 7 days   Lab Units 25  0502 25  0231 25  1427   PROCALCITONIN ng/mL  --  0.29*  --    LACTATE mmol/L 0.9  --  3.9*     Hemoglobin A1C   Date/Time Value Ref Range  Status   06/21/2025 1401 6.60 (H) 4.80 - 5.60 % Final     Glucose   Date/Time Value Ref Range Status   06/22/2025 0652 118 70 - 130 mg/dL Final   06/22/2025 0339 126 70 - 130 mg/dL Final   06/21/2025 2256 120 70 - 130 mg/dL Final   06/21/2025 1915 75 70 - 130 mg/dL Final   06/21/2025 1735 79 70 - 130 mg/dL Final   06/21/2025 1503 151 (H) 70 - 130 mg/dL Final   06/21/2025 1434 84 70 - 130 mg/dL Final       CT Head Without Contrast  Result Date: 6/21/2025  1. No acute intracranial abnormality is identified.  2. There is mild-moderate small vessel disease in the cerebral white matter. There are lens implants in the globes from previous cataract surgery. The remainder of the head CT is within normal limits. Specifically, no acute skull fracture or intracranial hemorrhage is identified.  Radiation dose reduction techniques were utilized, including automated exposure control and exposure modulation based on body size.       XR Chest 1 View  Result Date: 6/21/2025  No acute cardiopulmonary process  This report was finalized on 6/21/2025 2:39 PM by Dr. Rock Miranda M.D on Workstation: [id number]      Scheduled Medications  atorvastatin, 20 mg, Oral, Daily  cefTRIAXone, 2,000 mg, Intravenous, Q24H  cetirizine, 10 mg, Oral, Nightly    Infusions   Diet  Diet: Cardiac, Diabetic; Healthy Heart (2-3 Na+); Consistent Carbohydrate; Fluid Consistency: Thin (IDDSI 0)       Assessment/Plan     Active Hospital Problems    Diagnosis  POA    **Syncope and collapse [R55]  Yes    Hypoglycemia [E16.2]  Unknown    Hypertension [I10]  Yes    Hyperlipidemia [E78.5]  Yes    Diabetes mellitus [E11.9]  Yes    Asthma [J45.909]  Yes      Resolved Hospital Problems   No resolved problems to display.       69 y.o. female admitted with Syncope and collapse.      06/22/25  Discontinue antibiotics, no evidence of infection.  Likely can discharge tomorrow.    Hypoglycemia  - Blood glucose was as low as 36 on EMS  - Status post IV dextrose,  initiated on D5W, .   - Patient is on Toujeo insulin 40 units daily, Jardiance, metformin, pioglitazone, Amaryl outpatient.  A1c 6.6.  Per patient plans by PCP were to transition to metformin, Jardiance, Ozempic, the latter of which she is still trying to get covered by insurance.  I think it would be best to discontinue pioglitazone and glimepiride on discharge.  She will need close follow-up with PCP she does not typically check her blood sugars at home and a CGM similar to what her  has would be beneficial for her.  Will need close follow-up with PCP for ongoing management.  - Diabetic educator consult     Syncope and collapse  Hypotension, resolved  Lactic acidosis, resolved  -Syncope secondary to hypoglycemia and low blood pressure.  -WBC was 14.41 on admission, patient is afebrile.  Chest x-ray with no acute findings.  Urinalysis with no signs of infection.  Lactic acid elevated at 3.9  - Patient was given IV Zosyn and transitioned to Rocephin  -suspect all related to above. No evidence infection, monitor off ABX         DVT prophylaxis: SCDs  Discussed with patient and family.  Anticipated discharge home, tomorrow            Mumtaz Molina MD  [facility]  06/22/25  07:57 EDT

## 2025-06-22 NOTE — THERAPY EVALUATION
Patient Name: Marina Hugo  : 1955    MRN: [id number]                              Today's Date: 2025       Admit Date: 2025    Visit Dx:     ICD-10-CM ICD-9-CM   1. Syncope, unspecified syncope type  R55 780.2   2. Hypoglycemia  E16.2 251.2   3. Sepsis without acute organ dysfunction, due to unspecified organism  A41.9 038.9     995.91     Patient Active Problem List   Diagnosis    Hypertension    Asthma    Diabetes mellitus    Hyperlipidemia    Irritable bowel syndrome    Osteoarthritis    Chronic cough    Polyp of colon    Osteoarthritis of both knees    Medicare annual wellness visit, subsequent    Body mass index (BMI) of 45.0 to 49.9 in adult    COVID-19 virus infection    Allergic rhinitis    Syncope and collapse    Hypoglycemia     Past Medical History:   Diagnosis Date    Anemia     Anesthesia complication     SLOW TO WAKE UP    Arthritis     Asthma     Diabetes mellitus     High cholesterol     Hyperlipidemia     Hypertension     Irritable bowel syndrome     Obesity     Rotator cuff tear     Routine eye exam due    Sleep apnea     MOUTH PIECE     Past Surgical History:   Procedure Laterality Date    CARPAL TUNNEL RELEASE Right      SECTION      COLONOSCOPY      LAPAROSCOPIC GASTRIC BANDING      SHOULDER ROTATOR CUFF REPAIR Right 2021    Procedure: RIGHT SHOULDER ARTHROSCOPY WITH ACROMIOPLASTY, ROTATOR CUFF AND LABRAL DEBRIDEMENT;  Surgeon: Raj Walker MD;  [place] [facility] [place] [facility] [place]  Service: Orthopedics;  Laterality: Right;      General Information       Row Name 25 1536          Physical Therapy Time and Intention    Document Type evaluation;discharge evaluation/summary  -     Mode of Treatment individual therapy;physical therapy  -       Row Name 25 1536          General Information    Patient Profile Reviewed yes  -     Prior Level of Function independent:;gait;transfer;bed mobility  -     Existing Precautions/Restrictions no known  precautions/restrictions  -     Barriers to Rehab none identified  -       Row Name 06/22/25 1536          Living Environment    Current Living Arrangements home  -     People in Home spouse  -       Row Name 06/22/25 1536          Home Main Entrance    Number of Stairs, Main Entrance none  -[facility] Name 06/22/25 1536          Stairs Within Home, Primary    Number of Stairs, Within Home, Primary none  -[facility] Name 06/22/25 1536          Cognition    Orientation Status (Cognition) oriented x 4  -       Row Name 06/22/25 1536          Safety Issues/Impairments Affecting Functional Mobility    Impairments Affecting Function (Mobility) endurance/activity tolerance  -               User Key  (r) = Recorded By, (t) = Taken By, (c) = Cosigned By      Initials Name Provider Type     Mell Winkler PT Physical Therapist                   Mobility       Row Name 06/22/25 1536          Bed Mobility    Bed Mobility supine-sit  -     Supine-Sit [place] (Bed Mobility) supervision  -     Assistive Device (Bed Mobility) head of bed elevated;bed rails  -       Row Name 06/22/25 1536          Sit-Stand Transfer    Sit-Stand [place] (Transfers) supervision  -     Assistive Device (Sit-Stand Transfers) walker, front-wheeled  -       Row Name 06/22/25 1536          Gait/Stairs (Locomotion)    [place] Level (Gait) supervision;modified independence  -     Assistive Device (Gait) walker, front-wheeled  -     Distance in Feet (Gait) 200  -     Deviations/Abnormal Patterns (Gait) ezra decreased;gait speed decreased  -     Bilateral Gait Deviations forward flexed posture  -               User Key  (r) = Recorded By, (t) = Taken By, (c) = Cosigned By      Initials Name Provider Type     Mell Winkler PT Physical Therapist                   Obj/Interventions       Row Name 06/22/25 1537          Range of Motion Comprehensive    General Range of Motion bilateral lower  extremity ROM [facility]  -[facility] Name 06/22/25 1537          Strength Comprehensive (MMT)    General Manual Muscle Testing (MMT) Assessment no strength deficits identified  -     Comment, General Manual Muscle Testing (MMT) Assessment BLE WFL  -BH       Row Name 06/22/25 1537          Balance    Balance Assessment sitting static balance;sitting dynamic balance;standing dynamic balance;standing static balance  -     Static Sitting Balance independent  -     Dynamic Sitting Balance independent  -     Position, Sitting Balance unsupported;sitting edge of bed  -     Static Standing Balance independent  -     Dynamic Standing Balance supervision;modified independence  -     Position/Device Used, Standing Balance supported;walker, front-wheeled  -     Balance Interventions sitting;standing;sit to stand;supported;static;dynamic  -[facility] Name 06/22/25 1537          Sensory Assessment (Somatosensory)    Sensory Assessment (Somatosensory) LE sensation intact  -               User Key  (r) = Recorded By, (t) = Taken By, (c) = Cosigned By      Initials Name Provider Type     Mell Winkler, PT Physical Therapist                   Goals/Plan    No documentation.                  Clinical Impression       Row Name 06/22/25 1537          Pain    Pretreatment Pain Rating 0/10 - no pain  -     Posttreatment Pain Rating 0/10 - no pain  -BH       Row Name 06/22/25 1537          Plan of Care Review    Plan of Care Reviewed With patient;spouse  -     Outcome Evaluation Pt is a 70 yo F admitted after a syncopal episode d/t hypoglycemia. Pt lives with her  and reports independence at BL with no AD. Pt reports no other recent falls hx, no stairs at home, hx chronic B knee pain. Pt presents to PT with minimal functional mobility impairments, appears likely close to her BL. Pt transferred to B and stood with SV, ambulated to the bathroom and demo'd independence with hygiene and toilet transfers from  low commode. Pt ambulated 200ft around unit with rwx and SV-mod I. Pt with no overt LOB or safety concerns, no rest breaks needed, requested use of rwx for safety and reports she has one at home she may continue to use on DC. Pt returned to room and left sitting [facility] with needs met. Pt with no further acute PT needs, will sign-off, d/w RN. Anticipate DC home with family assist as needed.  -       Row Name 06/22/25 2452          Therapy Assessment/Plan (PT)    Criteria for Skilled Interventions Met (PT) no;no problems identified which require skilled intervention  -     Therapy Frequency (PT) evaluation only  -       Row Name 06/22/25 1537          Vital Signs    O2 Delivery Pre Treatment room air  -     O2 Delivery Intra Treatment room air  -     O2 Delivery Post Treatment room air  -       Row Name 06/22/25 5719          Positioning and Restraints    Pre-Treatment Position in bed  -     Post Treatment Position chair  -     In Chair notified nsg;reclined;call light within reach;encouraged to call for assist;with family/caregiver  -               User Key  (r) = Recorded By, (t) = Taken By, (c) = Cosigned By      Initials Name Provider Type     Mell Winkler, PT Physical Therapist                   Outcome Measures       Row Name 06/22/25 4011          How much help from another person do you currently need...    Turning from your back to your side while in flat bed without using bedrails? 4  -BH     Moving from lying on back to sitting on the side of a flat bed without bedrails? 4  -BH     Moving to and from a bed to a chair (including a wheelchair)? 4  -BH     Standing up from a chair using your arms (e.g., wheelchair, bedside chair)? 4  -BH     Climbing 3-5 steps with a railing? 3  -BH     To walk in hospital room? 4  -BH     AM-PAC 6 Clicks Score (PT) 23  -     Highest Level of Mobility Goal Walk 25 Feet or More-7  -       Row Name 06/22/25 9907          Functional Assessment    Outcome  Measure Options AM-PAC 6 Clicks Basic Mobility (PT)  -               User Key  (r) = Recorded By, (t) = Taken By, (c) = Cosigned By      Initials Name Provider Type     Mell Winkler PT Physical Therapist                                 Physical Therapy Education       Title: PT OT SLP Therapies (In Progress)       Topic: Physical Therapy (In Progress)       Point: Mobility training (Done)       Learning Progress Summary            Patient Acceptance, E,TB,D, VU by  at 6/22/2025 1540                      Point: Home exercise program (Not Started)       Learner Progress:  Not documented in this visit.              Point: Body mechanics (Done)       Learning Progress Summary            Patient Acceptance, E,TB,D, VU by  at 6/22/2025 1540                      Point: Precautions (Done)       Learning Progress Summary            Patient Acceptance, E,TB,D, VU by  at 6/22/2025 1540                                      User Key       Initials Effective Dates Name Provider Type Discipline     04/08/22 -  Mell Winkler PT Physical Therapist PT                  PT Recommendation and Plan     Outcome Evaluation: Pt is a 68 yo F admitted after a syncopal episode d/t hypoglycemia. Pt lives with her  and reports independence at BL with no AD. Pt reports no other recent falls hx, no stairs at home, hx chronic B knee pain. Pt presents to PT with minimal functional mobility impairments, appears likely close to her BL. Pt transferred to EOB and stood with SV, ambulated to the bathroom and demo'd independence with hygiene and toilet transfers from low commode. Pt ambulated 200ft around unit with rwx and SV-mod I. Pt with no overt LOB or safety concerns, no rest breaks needed, requested use of rwx for safety and reports she has one at home she may continue to use on DC. Pt returned to room and left sitting [facility] with needs met. Pt with no further acute PT needs, will sign-off, d/w RN. Anticipate DC home with  family assist as needed.     Time Calculation:         PT Charges       Row Name 06/22/25 1540             Time Calculation    Start Time 1347  -      Stop Time 1412  -      Time Calculation (min) 25 min  -      PT Received On 06/22/25  -         Time Calculation- PT    Total Timed Code Minutes- PT 23 minute(s)  -         Timed Charges    59749 - Gait Training Minutes  10  -BH      24444 - PT Therapeutic Activity Minutes 13  -         Total Minutes    Timed Charges Total Minutes 23  -       Total Minutes 23  -                User Key  (r) = Recorded By, (t) = Taken By, (c) = Cosigned By      Initials Name Provider Type     Mell Winkler, PT Physical Therapist                  Therapy Charges for Today       Code Description Service Date Service Provider Modifiers Qty    11350435674 HC GAIT TRAINING EA 15 MIN 6/22/2025 Mell Winkler, PT GP 1    21483460589 HC PT THERAPEUTIC ACT EA 15 MIN 6/22/2025 Mell Winkler, PT GP 1    95460371308 HC PT EVAL LOW COMPLEXITY 3 6/22/2025 Mell Winkler, PT GP 1            PT G-Codes  Outcome Measure Options: AM-PAC 6 Clicks Basic Mobility (PT)  AM-PAC 6 Clicks Score (PT): 23  PT Discharge Summary  Anticipated Discharge Disposition (PT): home with assist    Mell Winkler PT  6/22/2025

## 2025-06-22 NOTE — H&P
Patient Name:  Marina Hugo  YOB: 1955  MRN:  [id number]  Admit Date:  6/21/2025  Patient Care Team:  Efraín Rebollar MD as PCP - General (Internal Medicine)  Dwayne Allen MD as Referring Physician (Family Medicine)  Jaya Peralta MD (Gastroenterology)  Haley Galarza MD (Obstetrics and Gynecology)  Eric Fisher MD as Consulting Physician (Ophthalmology)  Raj Walker MD as Consulting Physician (Orthopedic Surgery)  Xochitl Ye MD (Dermatology)  Teodoro Heredia MD as Consulting Physician (Allergy and Immunology)      Subjective   History Present Illness     Chief Complaint   Patient presents with    Hypoglycemia    Fall           History of Present Illness  Patient is a 69-year-old female with a history of hypertension, type II DM, hyperlipidemia, asthma, obesity, presented to the ED after syncopal episode.  Patient was found to be hypoglycemic by EMS, was given IM glucagon as no IV access was able to be obtained.  On presentation to the ED patient blood glucose remained low at 44, was given dextrose and initiated on D5W.    Patient reports she takes months of care medications at night and takes insulin and BP medication in the morning.  This morning she only took insulin but not BP meds because she has not eaten yet.  Did not eat breakfast, and was heading for lunch with spouse.  Patient was driving, was not feeling well stopped her car and asked spouse to drive.  When walking around the car to get to passenger side of the vehicle patient fell down, per reports she did hit her head however spouse reports she did not hit her head.  Currently patient alert, oriented x 3, feeling much better.  Denies fevers or chills.  No urinary symptoms.  Reports she was at her normal baseline this morning.           Review of Systems   GENERAL: No fevers, chills, sweats.    RESPIRATORY: No cough, shortness of breath, hemoptysis or wheezing.   CVS: No chest pain,  palpitations, orthopnea, dyspnea on exertion   GI: No melena or hematochezia. No abdominal pain. No nausea, vomiting, constipation, diarrhea      Personal History     Past Medical History:   Diagnosis Date    Anemia     Anesthesia complication     SLOW TO WAKE UP    Arthritis     Asthma     Diabetes mellitus     High cholesterol     Hyperlipidemia     Hypertension     Irritable bowel syndrome     Obesity     Rotator cuff tear     Routine eye exam due    Sleep apnea     MOUTH PIECE     Past Surgical History:   Procedure Laterality Date    CARPAL TUNNEL RELEASE Right      SECTION      COLONOSCOPY      LAPAROSCOPIC GASTRIC BANDING      SHOULDER ROTATOR CUFF REPAIR Right 2021    Procedure: RIGHT SHOULDER ARTHROSCOPY WITH ACROMIOPLASTY, ROTATOR CUFF AND LABRAL DEBRIDEMENT;  Surgeon: Raj Walker MD;  [place] [facility] [place] [facility] [place]  Service: [place]opedics;  Laterality: Right;     Family History   Problem Relation Age of Onset    Anxiety disorder Mother     Colon cancer Mother     Depression Mother     Diabetes Mother     Hypertension Mother     Nephrolithiasis Mother     Macular degeneration Mother     Diabetes Father     Heart disease Father     Hypertension Father     Malig Hyperthermia Neg Hx      Social History     Tobacco Use    Smoking status: Never     Passive exposure: Never    Smokeless tobacco: Never   Vaping Use    Vaping status: Never Used   Substance Use Topics    Alcohol use: No     Comment: Caffeine use: 2-3 cups daily    Drug use: No     No current facility-administered medications on file prior to encounter.     Current Outpatient Medications on File Prior to Encounter   Medication Sig Dispense Refill    Acetaminophen (TYLENOL ARTHRITIS PAIN PO) Take  by mouth 4 (Four) Times a Day As Needed.      amLODIPine (NORVASC) 10 MG tablet TAKE ONE TABLET BY MOUTH AT BEDTIME 90 tablet 1    atorvastatin (LIPITOR) 20 MG tablet Take 1 tablet by mouth Daily. 90 tablet 1    celecoxib (CeleBREX) 200 MG  capsule Take 1 capsule by mouth 2 (Two) Times a Day. 180 capsule 1    cetirizine (zyrTEC) 10 MG tablet Take 1 tablet by mouth Every Night.      Dupixent 300 MG/2ML solution prefilled syringe       empagliflozin (Jardiance) 10 MG tablet tablet Take 1 tablet by mouth every night at bedtime. 90 tablet 1    glimepiride (AMARYL) 4 MG tablet Take 1 tablet by mouth Every Morning Before Breakfast. 90 tablet 1    Insulin Glargine, 1 Unit Dial, (Toujeo SoloStar) 300 UNIT/ML solution pen-injector injection Inject 40 Units under the skin into the appropriate area as directed Daily. 18 mL 1    Insulin Pen Needle (BD Pen Needle Mariel 2nd Gen) 32G X 4 MM misc Use  each 3    metFORMIN ER (GLUCOPHAGE-XR) 500 MG 24 hr tablet Take 4 tablets by mouth Every Night. 360 tablet 1    pioglitazone (ACTOS) 30 MG tablet Take 1 tablet by mouth every night at bedtime. 90 tablet 1    valsartan-hydrochlorothiazide (DIOVAN-HCT) 160-12.5 MG per tablet TAKE ONE TABLET BY MOUTH DAILY 90 tablet 1    albuterol sulfate  (90 Base) MCG/ACT inhaler Inhale 2 puffs Every 4 (Four) Hours As Needed for Wheezing. 18 g 3    benzonatate (TESSALON) 200 MG capsule Take 1 capsule by mouth 3 (Three) Times a Day As Needed for Cough. 30 capsule 11    cyclobenzaprine (FLEXERIL) 5 MG tablet Take 1 tablet by mouth 2 (Two) Times a Day As Needed for Muscle Spasms. 20 tablet 0    dicyclomine (BENTYL) 10 MG capsule Take 1 capsule by mouth As Needed for Abdominal Cramping. 60 capsule 5    empagliflozin (JARDIANCE) 10 MG tablet tablet Take 1 tablet by mouth Daily. 7 tablet 0    ferrous sulfate 325 (65 FE) MG tablet TAKE 1 TABLET BY MOUTH EVERY DAY AT NIGHT 90 tablet 1    Fluticasone-Umeclidin-Vilant (Trelegy Ellipta) 200-62.5-25 MCG/ACT inhaler Inhale 1 puff Daily. (Patient not taking: Reported on 3/26/2025) 180 each 1    hydrocortisone 2.5 % cream Apply 1 Application topically to the appropriate area as directed 2 (Two) Times a Day.      levalbuterol (XOPENEX) 0.63  MG/3ML nebulizer solution Take 1 ampule by nebulization Every 6 (Six) Hours As Needed. (Patient not taking: Reported on 3/26/2025)      promethazine-dextromethorphan (PROMETHAZINE-DM) 6.25-15 MG/5ML syrup Take 5 mL by mouth At Night As Needed for Cough. 118 mL 0    Semaglutide, 2 MG/DOSE, (OZEMPIC) 8 MG/3ML solution pen-injector Inject 2 mg under the skin into the appropriate area as directed 1 (One) Time Per Week. 9 mL 1     Allergies   Allergen Reactions    Promethazine Unknown - High Severity     Was able to take Promethazine DM       Objective    Objective     Vital Signs  Temp:  [97.4 °F (36.3 °C)-98.1 °F (36.7 °C)] 98.1 °F (36.7 °C)  Heart Rate:  [77-91] 88  Resp:  [18] 18  BP: ()/(48-89) 125/89  SpO2:  [91 %-100 %] 99 %  on   ;   Device (Oxygen Therapy): room air  Body mass index is 49.87 kg/m².    Physical Exam  General: Alert, lying in bed, not in distress, obese  HEENT: Normocephalic, atraumatic  Eyes: PERRL, EOMI, anicteric sclerae  Lungs: Clear to auscultation bilaterally, no crackles or wheezes  CV: Regular rate and rhythm, no murmurs rubs or gallops  Abdomen: Soft, nontender, nondistended.  Normoactive bowel sounds  Extremities: No significant peripheral edema  Skin: Clean/dry/intact, no rashes  Neuro: Cranial nerves II through XII intact, no gross focal neurological deficits appreciated  Psych: Appropriate mood and affect    Results Review:  I reviewed the patient's new clinical results.  I reviewed the patient's new imaging results and agree with the interpretation.  I reviewed the patient's other test results and agree with the interpretation  I personally viewed and interpreted the patient's EKG/Telemetry data  Discussed with ED provider.    Lab Results (last 24 hours)       Procedure Component Value Units Date/Time    CBC & Differential [[id number]]  (Abnormal) Collected: 06/21/25 1401    Specimen: Blood Updated: 06/21/25 1417    Narrative:      The following orders were created for panel  order CBC & Differential.  Procedure                               Abnormality         Status                     ---------                               -----------         ------                     CBC Auto Differential[[id number]]        Abnormal            Final result                 Please view results for these tests on the individual orders.    Comprehensive Metabolic Panel [[id number]]  (Abnormal) Collected: 06/21/25 1401    Specimen: Blood Updated: 06/21/25 1442     Glucose 44 mg/dL      BUN 23.0 mg/dL      Creatinine 0.88 mg/dL      Sodium 144 mmol/L      Potassium 3.1 mmol/L      Chloride 105 mmol/L      CO2 23.9 mmol/L      Calcium 10.6 mg/dL      Total Protein 7.2 g/dL      Albumin 4.3 g/dL      ALT (SGPT) 11 U/L      AST (SGOT) 15 U/L      Alkaline Phosphatase 91 U/L      Total Bilirubin 0.4 mg/dL      Globulin 2.9 gm/dL      A/G Ratio 1.5 g/dL      BUN/Creatinine Ratio 26.1     Anion Gap 15.1 mmol/L      eGFR 70.4 mL/min/1.73     Narrative:      GFR Categories in Chronic Kidney Disease (CKD)              GFR Category          GFR (mL/min/1.73)    Interpretation  G1                    90 or greater        Normal or high (1)  G2                    60-89                Mild decrease (1)  G3a                   45-59                Mild to moderate decrease  G3b                   30-44                Moderate to severe decrease  G4                    15-29                Severe decrease  G5                    14 or less           Kidney failure    (1)In the absence of evidence of kidney disease, neither GFR category G1 or G2 fulfill the criteria for CKD.    eGFR calculation 2021 CKD-EPI creatinine equation, which does not include race as a factor    CBC Auto Differential [[id number]]  (Abnormal) Collected: 06/21/25 1401    Specimen: Blood Updated: 06/21/25 1417     WBC 13.31 10*3/mm3      RBC 4.86 10*6/mm3      Hemoglobin 14.6 g/dL      Hematocrit 45.2 %      MCV 93.0 fL      MCH 30.0 pg      MCHC 32.3 g/dL       RDW 12.7 %      RDW-SD 43.1 fl      MPV 9.7 fL      Platelets 334 10*3/mm3      Neutrophil % 46.2 %      Lymphocyte % 41.8 %      Monocyte % 7.1 %      Eosinophil % 3.4 %      Basophil % 0.8 %      Immature Grans % 0.7 %      Neutrophils, Absolute 6.16 10*3/mm3      Lymphocytes, Absolute 5.57 10*3/mm3      Monocytes, Absolute 0.94 10*3/mm3      Eosinophils, Absolute 0.45 10*3/mm3      Basophils, Absolute 0.10 10*3/mm3      Immature Grans, Absolute 0.09 10*3/mm3     High Sensitivity Troponin T [[id number]]  (Normal) Collected: 06/21/25 1401    Specimen: Blood Updated: 06/21/25 1449     HS Troponin T 10 ng/L     Narrative:      High Sensitive Troponin T Reference Range:  <14.0 ng/L- Negative Female for AMI  <22.0 ng/L- Negative Male for AMI  >=14 - Abnormal Female indicating possible myocardial injury.  >=22 - Abnormal Male indicating possible myocardial injury.   Clinicians would have to utilize clinical acumen, EKG, Troponin, and serial changes to determine if it is an Acute Myocardial Infarction or myocardial injury due to an underlying chronic condition.         Hemoglobin A1c [[id number]]  (Abnormal) Collected: 06/21/25 1401    Specimen: Blood Updated: 06/21/25 2126     Hemoglobin A1C 6.60 %     Narrative:      Hemoglobin A1C Ranges:    Increased Risk for Diabetes  5.7% to 6.4%  Diabetes                     >= 6.5%  Diabetic Goal                < 7.0%    POC Glucose Once [[id number]]  (Abnormal) Collected: 06/21/25 1405    Specimen: Blood Updated: 06/21/25 1406     Glucose 157 mg/dL     Lactic Acid, Plasma [[id number]]  (Abnormal) Collected: 06/21/25 1427    Specimen: Blood from Hand, Right Updated: 06/21/25 1507     Lactate 3.9 mmol/L     POC Glucose Once [[id number]]  (Normal) Collected: 06/21/25 1434    Specimen: Blood Updated: 06/21/25 1443     Glucose 84 mg/dL     POC Glucose Once [[id number]]  (Abnormal) Collected: 06/21/25 1503    Specimen: Blood Updated: 06/21/25 1504     Glucose 151 mg/dL     POC Glucose  Once [[id number]]  (Normal) Collected: 06/21/25 1735    Specimen: Blood Updated: 06/21/25 1736     Glucose 79 mg/dL     Urinalysis With Culture If Indicated - Urine, Clean Catch [[id number]]  (Abnormal) Collected: 06/21/25 1826    Specimen: Urine, Clean Catch Updated: 06/21/25 1835     Color, UA Yellow     Appearance, UA Clear     pH, UA <=5.0     Specific Gravity, UA 1.024     Glucose, UA >=1000 mg/dL (3+)     Ketones, UA Negative     Bilirubin, UA Negative     Blood, UA Negative     Protein, UA Negative     Leuk Esterase, UA Negative     Nitrite, UA Negative     Urobilinogen, UA 1.0 E.U./dL    Narrative:      In absence of clinical symptoms, the presence of pyuria, bacteria, and/or nitrites on the urinalysis result does not correlate with infection.  Urine microscopic not indicated.    POC Glucose Once [[id number]]  (Normal) Collected: 06/21/25 1915    Specimen: Blood Updated: 06/21/25 1916     Glucose 75 mg/dL             Imaging Results (Last 24 Hours)       Procedure Component Value Units Date/Time    CT Head Without Contrast - Preliminary [[id number]] Collected: 06/21/25 1630     Updated: 06/21/25 1630    This result has not been signed. Information might be incomplete.      Narrative:      EMERGENCY CT SCAN OF THE HEAD WITHOUT CONTRAST ON 06/21/2025     CLINICAL HISTORY: This is a 69-year-old female patient who had an acute  syncopal episode, head injury.     TECHNIQUE: Spiral CTA images were obtained from the base of the skull to  the vertex without intravenous contrast. The images were reformatted and  are submitted in 3 mm thick axial, sagittal and coronal CT sections with  brain algorithm and 2 mm thick axial CT sections with high-resolution  bone algorithm.     COMPARISON:  There are no prior head CTs from [facility] for comparison.     FINDINGS: There are patchy areas of low-density in the periventricular  and subcortical white matter of the cerebral hemispheres consistent  with  mild-moderate small vessel disease. The remainder of the brain  parenchyma is normal in attenuation. The ventricles are normal in size.  I see no focal mass effect. There is no midline shift. No extra axial  fluid collections are identified. There is no evidence of acute  intracranial hemorrhage. No acute skull fracture is identified. There  are lens implants in the globes from previous cataract surgery,  otherwise, the orbits are normal in appearance. The paranasal sinuses  and the mastoid air cells and middle ear cavities are clear.       Impression:      1. No acute intracranial abnormality is identified.     2. There is mild-moderate small vessel disease in the cerebral white  matter. There are lens implants in the globes from previous cataract  surgery. The remainder of the head CT is within normal limits.  Specifically, no acute skull fracture or intracranial hemorrhage is  identified.     Radiation dose reduction techniques were utilized, including automated  exposure control and exposure modulation based on body size.          XR Chest 1 View [[id number]] Collected: 06/21/25 1438     Updated: 06/21/25 1442    Narrative:      XR CHEST 1 VW-        INDICATION: Syncope, hypotension     COMPARISON: Chest radiograph June 4, 2021     TECHNIQUE: 1 view chest     FINDINGS:      No focal opacity. Suspected epicardial fat pad at the left lung base. No  effusions. Stable mediastinum. Heart is normal in size.       Impression:      No acute cardiopulmonary process     This report was finalized on 6/21/2025 2:39 PM by Dr. Rock Miranda M.D on Workstation: [id number]               Results for orders placed during the hospital encounter of 04/15/22    Adult Transthoracic Echo Complete w/ Color, Spectral and Contrast if Necessary Per Protocol    Interpretation Summary  · Calculated left ventricular EF = 69.5% Estimated left ventricular EF was in agreement with the calculated left ventricular EF. Left ventricular  systolic function is normal.  · Left ventricular diastolic function is consistent with (grade I) impaired relaxation.  · Left ventricular wall thickness is consistent with borderline concentric hypertrophy.  · Normal valvular structure and function      ECG 12 Lead Syncope   Final Result   HEART RATE=88  bpm   RR Whmkpslf=922  ms   [place] Pvdcujrt=273  ms   P Horizontal Axis=-2  deg   P Front Axis=58  deg   QRSD Interval=94  ms   QT Jklaggtr=948  ms   MGoF=818  ms   QRS Axis=64  deg   T Wave Axis=-5  deg   - BORDERLINE ECG -   Sinus rhythm   Borderline  repolarization abnormality   No change from prior tracing   Electronically Signed By: Robert Johansen ([facility]) 2025-06-21 17:27:18   Date and Time of Study:2025-06-21 14:18:11           Assessment/Plan     Active Hospital Problems    Diagnosis  POA    **Syncope and collapse [R55]  Yes    Hypoglycemia [E16.2]  Unknown    Hypertension [I10]  Yes    Hyperlipidemia [E78.5]  Yes    Diabetes mellitus [E11.9]  Yes    Asthma [J45.909]  Yes      Resolved Hospital Problems   No resolved problems to display.     Hypoglycemia  - Blood glucose was as low as 36 on EMS arrival per chart review.  - Blood glucose was 44 on presentation to the ED.  - Status post IV dextrose, initiated on D5W, .  Check hemoglobin A1c  - Patient is on Toujeo  insulin 40 units daily, Jardiance, metformin, pioglitazone, Amaryl outpatient.  Reports takes only insulin in the morning and the rest of the medications at night.  Took insulin this morning however did not eat breakfast, states intermittently skips breakfast.  - Also does not check blood glucose at home,   -Hold all meds.  -Check hemoglobin A1c, continue D5W,  - Diabetic educator consult  - Discussed with patient importance of blood glucose monitoring as she is on insulin and multiple p.o. meds.  Patient's spouse has continuous glucose monitor, discussed that she would benefit from continuous glucose monitor however patient stated that she does not  wanted until she follows with PCP.  Discussed with seriousness of current presentation, explained that she had severe hypoglycemia and could have  from diet, and blood glucose monitoring is very important especially in her case when she is on insulin and multiple p.o. meds.    Syncope and collapse  Hypotension  Lactic acidosis  -Syncope secondary to hypoglycemia and low blood pressure.  -WBC was 14.41 on admission, patient is afebrile.  Chest x-ray with no acute findings.  Urinalysis with no signs of infection.  Lactic acid elevated at 3.9  - Was initiated on IV fluids, blood pressure improved.  Hold outpatient p.o. meds.  - Patient was given IV Zosyn on admission in the setting of elevated lactic acid and WBC with concern for sepsis.  I have low suspicion for sepsis and suspect lactic acidosis.  Hypotension and WBC likely reactive in the setting of severe hypoglycemia.  Will order blood cultures, initiate empiric IV ceftriaxone, if blood cultures negative antibiotics can be discontinued.  Trend lactic acid          I discussed the patient's findings and my recommendations with patient.    VTE Prophylaxis - SCDs.  Code Status - Full code.       Peyton Martinez MD  [facility]  25  21:45 EDT

## 2025-06-22 NOTE — PLAN OF CARE
Goal Outcome Evaluation:  Plan of Care Reviewed With: patient, spouse           Outcome Evaluation: Pt is a 68 yo F admitted after a syncopal episode d/t hypoglycemia. Pt lives with her  and reports independence at BL with no AD. Pt reports no other recent falls hx, no stairs at home, hx chronic B knee pain. Pt presents to PT with minimal functional mobility impairments, appears likely close to her BL. Pt transferred to EOB and stood with SV, ambulated to the bathroom and demo'd independence with hygiene and toilet transfers from low commode. Pt ambulated 200ft around unit with rwx and SV-mod I. Pt with no overt LOB or safety concerns, no rest breaks needed, requested use of rwx for safety and reports she has one at home she may continue to use on DC. Pt returned to room and left sitting UIC with needs met. Pt with no further acute PT needs, will sign-off, d/w RN. Anticipate DC home with family assist as needed.    Anticipated Discharge Disposition (PT): home with assist

## 2025-06-22 NOTE — ED NOTES
.Nursing report ED to floor  Marina Hugo  69 y.o.  female    HPI :  HPI  Stated Reason for Visit: hypoglycemic, fall hitting head on concrete  History Obtained From: EMS    Chief Complaint  Chief Complaint   Patient presents with    Hypoglycemia    Fall       Admitting doctor:   Peyton Martinez MD    Admitting diagnosis:   The primary encounter diagnosis was Syncope, unspecified syncope type. Diagnoses of Hypoglycemia and Sepsis without acute organ dysfunction, due to unspecified organism were also pertinent to this visit.    Code status:   Current Code Status       Date Active Code Status Order ID Comments User Context       Not on file            Allergies:   Promethazine    Isolation:   No active isolations    Intake and Output    Intake/Output Summary (Last 24 hours) at 6/21/2025 2030  Last data filed at 6/21/2025 1939  Gross per 24 hour   Intake 1100 ml   Output --   Net 1100 ml       Weight:       06/21/25  1403   Weight: 128 kg (281 lb 8.4 oz)       Most recent vitals:   Vitals:    06/21/25 1815 06/21/25 1820 06/21/25 1828 06/21/25 1933   BP: 144/78 123/85  126/70   Pulse: 85 90  88   Resp:       Temp:   97.8 °F (36.6 °C)    TempSrc:   Oral    SpO2: 100% 99%  97%   Weight:       Height:           Active LDAs/IV Access:   Lines, Drains & Airways       Active LDAs       Name Placement date Placement time Site Days    Peripheral IV 06/21/25 1355 20 G Anterior;Right Forearm 06/21/25  1355  Forearm  less than 1    Peripheral IV 06/21/25 1407 20 G Left Forearm 06/21/25  1407  Forearm  less than 1                    Labs (abnormal labs have a star):   Labs Reviewed   COMPREHENSIVE METABOLIC PANEL - Abnormal; Notable for the following components:       Result Value    Glucose 44 (*)     Potassium 3.1 (*)     Calcium 10.6 (*)     BUN/Creatinine Ratio 26.1 (*)     Anion Gap 15.1 (*)     All other components within normal limits    Narrative:     GFR Categories in Chronic Kidney Disease (CKD)              GFR  Category          GFR (mL/min/1.73)    Interpretation  G1                    90 or greater        Normal or high (1)  G2                    60-89                Mild decrease (1)  G3a                   45-59                Mild to moderate decrease  G3b                   30-44                Moderate to severe decrease  G4                    15-29                Severe decrease  G5                    14 or less           Kidney failure    (1)In the absence of evidence of kidney disease, neither GFR category G1 or G2 fulfill the criteria for CKD.    eGFR calculation 2021 CKD-EPI creatinine equation, which does not include race as a factor   CBC WITH AUTO DIFFERENTIAL - Abnormal; Notable for the following components:    WBC 13.31 (*)     Immature Grans % 0.7 (*)     Lymphocytes, Absolute 5.57 (*)     Monocytes, Absolute 0.94 (*)     Eosinophils, Absolute 0.45 (*)     Immature Grans, Absolute 0.09 (*)     All other components within normal limits   LACTIC ACID, PLASMA - Abnormal; Notable for the following components:    Lactate 3.9 (*)     All other components within normal limits   URINALYSIS W/ CULTURE IF INDICATED - Abnormal; Notable for the following components:    Glucose, UA >=1000 mg/dL (3+) (*)     All other components within normal limits    Narrative:     In absence of clinical symptoms, the presence of pyuria, bacteria, and/or nitrites on the urinalysis result does not correlate with infection.  Urine microscopic not indicated.   POCT GLUCOSE FINGERSTICK - Abnormal; Notable for the following components:    Glucose 157 (*)     All other components within normal limits   POCT GLUCOSE FINGERSTICK - Abnormal; Notable for the following components:    Glucose 151 (*)     All other components within normal limits   TROPONIN - Normal    Narrative:     High Sensitive Troponin T Reference Range:  <14.0 ng/L- Negative Female for AMI  <22.0 ng/L- Negative Male for AMI  >=14 - Abnormal Female indicating possible  myocardial injury.  >=22 - Abnormal Male indicating possible myocardial injury.   Clinicians would have to utilize clinical acumen, EKG, Troponin, and serial changes to determine if it is an Acute Myocardial Infarction or myocardial injury due to an underlying chronic condition.        POCT GLUCOSE FINGERSTICK - Normal   POCT GLUCOSE FINGERSTICK - Normal   POCT GLUCOSE FINGERSTICK - Normal   BLOOD CULTURE   BLOOD CULTURE   HIGH SENSITIVITIY TROPONIN T 1HR   LACTIC ACID, REFLEX   PROCALCITONIN   POCT GLUCOSE FINGERSTICK   POCT GLUCOSE FINGERSTICK   POCT GLUCOSE FINGERSTICK   CBC AND DIFFERENTIAL    Narrative:     The following orders were created for panel order CBC & Differential.  Procedure                               Abnormality         Status                     ---------                               -----------         ------                     CBC Auto Differential[[id number]]        Abnormal            Final result                 Please view results for these tests on the individual orders.       EKG:   ECG 12 Lead Syncope   Final Result   HEART RATE=88  bpm   RR Fmzjofvx=429  ms   NM Tjuihtxz=902  ms   P Horizontal Axis=-2  deg   P Front Axis=58  deg   QRSD Interval=94  ms   QT Iofwlzli=870  ms   THvK=630  ms   QRS Axis=64  deg   T Wave Axis=-5  deg   - BORDERLINE ECG -   Sinus rhythm   Borderline  repolarization abnormality   No change from prior tracing   Electronically Signed By: Robert Johansen ([facility]) 2025-06-21 17:27:18   Date and Time of Study:2025-06-21 14:18:11          Meds given in ED:   Medications   sodium chloride 0.9 % flush 10 mL (has no administration in time range)   dextrose 5 % and sodium chloride 0.9 % infusion (100 mL/hr Intravenous New Bag 6/21/25 1808)   dextrose (D50W) (25 g/50 mL) IV injection 25 g (25 g Intravenous Given 6/21/25 1357)   sodium chloride 0.9 % bolus 1,000 mL (0 mL Intravenous Stopped 6/21/25 1528)   dextrose (D50W) (25 g/50 mL) IV injection 25 g (25 g  Intravenous Given 6/21/25 1439)   sepsis fluid NS 0.9 % bolus 2,478 mL (1,478 mL Intravenous New Bag 6/21/25 1528)   piperacillin-tazobactam (ZOSYN) 4.5 g IVPB in 100 mL NS MBP (CD) (0 g Intravenous Stopped 6/21/25 1939)       Imaging results:  CT Head Without Contrast  Result Date: 6/21/2025  1. No acute intracranial abnormality is identified.  2. There is mild-moderate small vessel disease in the cerebral white matter. There are lens implants in the globes from previous cataract surgery. The remainder of the head CT is within normal limits. Specifically, no acute skull fracture or intracranial hemorrhage is identified.  Radiation dose reduction techniques were utilized, including automated exposure control and exposure modulation based on body size.       XR Chest 1 View  Result Date: 6/21/2025  No acute cardiopulmonary process  This report was finalized on 6/21/2025 2:39 PM by Dr. Rock Miranda M.D on Workstation: [id number]          Social issues:   Social History     Socioeconomic History    Marital status:    Tobacco Use    Smoking status: Never     Passive exposure: Never    Smokeless tobacco: Never   Vaping Use    Vaping status: Never Used   Substance and Sexual Activity    Alcohol use: No     Comment: Caffeine use: 2-3 cups daily    Drug use: No    Sexual activity: Defer       Peripheral Neurovascular  Peripheral Neurovascular (Adult)  Peripheral Neurovascular WDL: WDL    Neuro Cognitive  Neuro Cognitive (Adult)  Cognitive/Neuro/Behavioral WDL: .WDL except, orientation  Orientation: person    Learning  Learning Assessment  Learning Readiness and Ability: cognitive limitation noted    Respiratory  Respiratory WDL  Respiratory WDL: WDL    Abdominal Pain       Pain Assessments  Pain (Adult)  (0-10) Pain Rating: Rest: 0  (0-10) Pain Rating: Activity: 0    NIH Stroke Scale       Heidi Hernandez RN  06/21/25 20:30 EDT

## 2025-06-23 ENCOUNTER — READMISSION MANAGEMENT (OUTPATIENT)
Dept: CALL CENTER | Facility: HOSPITAL | Age: 70
End: 2025-06-23
Payer: MEDICARE

## 2025-06-23 VITALS
TEMPERATURE: 97.8 F | HEART RATE: 82 BPM | DIASTOLIC BLOOD PRESSURE: 80 MMHG | RESPIRATION RATE: 14 BRPM | OXYGEN SATURATION: 94 % | SYSTOLIC BLOOD PRESSURE: 136 MMHG | WEIGHT: 282.63 LBS | HEIGHT: 63 IN | BODY MASS INDEX: 50.08 KG/M2

## 2025-06-23 LAB
ANION GAP SERPL CALCULATED.3IONS-SCNC: 10.8 MMOL/L (ref 5–15)
BUN SERPL-MCNC: 14 MG/DL (ref 8–23)
BUN/CREAT SERPL: 18.7 (ref 7–25)
CALCIUM SPEC-SCNC: 9.6 MG/DL (ref 8.6–10.5)
CHLORIDE SERPL-SCNC: 105 MMOL/L (ref 98–107)
CO2 SERPL-SCNC: 22.2 MMOL/L (ref 22–29)
CREAT SERPL-MCNC: 0.75 MG/DL (ref 0.57–1)
DEPRECATED RDW RBC AUTO: 43.5 FL (ref 37–54)
EGFRCR SERPLBLD CKD-EPI 2021: 85.2 ML/MIN/1.73
ERYTHROCYTE [DISTWIDTH] IN BLOOD BY AUTOMATED COUNT: 12.9 % (ref 12.3–15.4)
GLUCOSE BLDC GLUCOMTR-MCNC: 153 MG/DL (ref 70–130)
GLUCOSE BLDC GLUCOMTR-MCNC: 154 MG/DL (ref 70–130)
GLUCOSE SERPL-MCNC: 134 MG/DL (ref 65–99)
HCT VFR BLD AUTO: 40.6 % (ref 34–46.6)
HGB BLD-MCNC: 13.5 G/DL (ref 12–15.9)
MAGNESIUM SERPL-MCNC: 1.9 MG/DL (ref 1.6–2.4)
MCH RBC QN AUTO: 30.7 PG (ref 26.6–33)
MCHC RBC AUTO-ENTMCNC: 33.3 G/DL (ref 31.5–35.7)
MCV RBC AUTO: 92.3 FL (ref 79–97)
PHOSPHATE SERPL-MCNC: 3.1 MG/DL (ref 2.5–4.5)
PLATELET # BLD AUTO: 195 10*3/MM3 (ref 140–450)
PMV BLD AUTO: 10.8 FL (ref 6–12)
POTASSIUM SERPL-SCNC: 4 MMOL/L (ref 3.5–5.2)
RBC # BLD AUTO: 4.4 10*6/MM3 (ref 3.77–5.28)
SODIUM SERPL-SCNC: 138 MMOL/L (ref 136–145)
WBC NRBC COR # BLD AUTO: 7.42 10*3/MM3 (ref 3.4–10.8)

## 2025-06-23 PROCEDURE — 85027 COMPLETE CBC AUTOMATED: CPT | Performed by: STUDENT IN AN ORGANIZED HEALTH CARE EDUCATION/TRAINING PROGRAM

## 2025-06-23 PROCEDURE — 80048 BASIC METABOLIC PNL TOTAL CA: CPT | Performed by: STUDENT IN AN ORGANIZED HEALTH CARE EDUCATION/TRAINING PROGRAM

## 2025-06-23 PROCEDURE — 84100 ASSAY OF PHOSPHORUS: CPT | Performed by: STUDENT IN AN ORGANIZED HEALTH CARE EDUCATION/TRAINING PROGRAM

## 2025-06-23 PROCEDURE — 82948 REAGENT STRIP/BLOOD GLUCOSE: CPT

## 2025-06-23 PROCEDURE — 83735 ASSAY OF MAGNESIUM: CPT | Performed by: STUDENT IN AN ORGANIZED HEALTH CARE EDUCATION/TRAINING PROGRAM

## 2025-06-23 RX ORDER — INSULIN GLARGINE 300 U/ML
20 INJECTION, SOLUTION SUBCUTANEOUS DAILY
Start: 2025-06-23

## 2025-06-23 RX ADMIN — CELECOXIB 100 MG: 100 CAPSULE ORAL at 09:05

## 2025-06-23 RX ADMIN — ATORVASTATIN CALCIUM 20 MG: 20 TABLET, FILM COATED ORAL at 09:05

## 2025-06-23 NOTE — CONSULTS
"Diabetes Education  Assessment/Teaching    Patient Name:  Marina Hugo  YOB: 1955  MRN: [id number]  Admit Date:  6/21/2025      Assessment Date:  6/23/2025  Flowsheet Row Most Recent Value   General Information     Referral From: Other (comment)  [\"Teach blood sugar monitoring\"]   Height 160 cm (63\")   Height Method Stated   Weight 128 kg (282 lb 10.1 oz)   Weight Method Bed scale   Diabetes History    What type of diabetes do you have? Type 2   Length of Diabetes Diagnosis 10 + years   Do you test your blood sugar at home? no   Have you had low blood sugar? (<70mg/dl) yes   Have you had high blood sugar? (>140mg/dl) yes   Do you have any diabetes complications? circulation problems   Education Preferences    What areas of diabetes would you like to learn about? avoiding low blood sugar, testing my blood sugar at home   Barriers to Learning other (comment)  [none evident]   Assessment Topics    Taking Medication - Assessment Needs education   Problem Solving - Assessment Needs education   Reducing Risk - Assessment Needs education   Monitoring - Assessment Needs education   DM Goals    Taking Medication - Goal 0-7 days from discharge   Problem Solving - Goal 0-7 days from discharge   Reducing Risk - Goal 0-30 days from discharge   Monitoring - Goal 0-7 days from discharge  [encouraged pt to check BG level daily]   Contact Plan 0-30 days from discharge, Follow-up medical care  [pt has an appt with her PCP this coming Wednesday]         Flowsheet Row Most Recent Value   DM Education Needs    Meter Has own  [spouse has 2 meters,  pt using one of them]   Blood Glucose Target Range    Medication Oral, Insulin, Side effects   Problem Solving Hypoglycemia, Signs, Symptoms, Treatment   Reducing Risks A1C testing   Healthy Coping Appropriate   Discharge Plan Home, Follow-up with PCP   Motivation Engaged   Teaching Method Explanation, Discussion, Handouts, Teach back   Patient Response Verbalized " understanding           Other Comments:  Met with pt and spouse at bedside to discuss her diabetes management. Pt admitted because she had a BG in the 30s. Pt states she takes all of her diabetes medications as prescribed but does not check her BG levels regularly. Discussed importance of monitoring BG levels daily especially when taking diabetes medications. Pt and  state they have two meters and enough supplies at home. Discussed signs/symptoms of hypoglycemia and how to treat. Pt instructed to keep source of carbs in her pocket at all times in case she has low BG level in the future. Pt states she has a great relationship with her PCP and that they have a plan in place regarding her current diabetes medications. She has a follow up appt this week.         Electronically signed by:  ALVARO Levy  06/23/25 13:31 EDT

## 2025-06-23 NOTE — DISCHARGE SUMMARY
Date of Admission: 6/21/2025  Date of Discharge:  6/23/2025  Primary Care Physician: Efraín Rebollar MD     Discharge Diagnosis:  Active Hospital Problems    Diagnosis  POA    **Syncope and collapse [R55]  Yes    Hypoglycemia [E16.2]  Yes    Hypertension [I10]  Yes    Hyperlipidemia [E78.5]  Yes    Diabetes mellitus [E11.9]  Yes    Asthma [J45.909]  Yes      Resolved Hospital Problems   No resolved problems to display.       Presenting Problem/History of Present Illness from H&P:  Syncope and collapse [R55]  Hypoglycemia [E16.2]  Syncope, unspecified syncope type [R55]  Sepsis without acute organ dysfunction, due to unspecified organism [A41.9]     Patient is a 69-year-old female with a history of hypertension, type II DM, hyperlipidemia, asthma, obesity, presented to the ED after syncopal episode.  Patient was found to be hypoglycemic by EMS, was given IM glucagon as no IV access was able to be obtained.  On presentation to the ED patient blood glucose remained low at 44, was given dextrose and initiated on D5W.     Patient reports she takes months of care medications at night and takes insulin and BP medication in the morning.  This morning she only took insulin but not BP meds because she has not eaten yet.  Did not eat breakfast, and was heading for lunch with spouse.  Patient was driving, was not feeling well stopped her car and asked spouse to drive.  When walking around the car to get to passenger side of the vehicle patient fell down, per reports she did hit her head however spouse reports she did not hit her head.  Currently patient alert, oriented x 3, feeling much better.  Denies fevers or chills.  No urinary symptoms.  Reports she was at her normal baseline this morning.    Hospital Course:  The patient is a 69 y.o. female who presented with syncope.  She was hypoglycemic and hypotensive on presentation.  She was on multiple antihypertensives and multiple diabetic medications.  She was not  monitoring her glucose as an outpatient.  Symptoms have improved with holding medications and replacing glucose.  A1c 6.6.  Have decreased her long-acting insulin by half and discontinued the Amaryl and Actos.  She needs to monitor her insulin as an outpatient and have close follow-up with her primary care to continue titration.    Blood pressure is not grossly elevated off of her combo medication.  Going to hold that at discharge and she needs close follow-up with primary care for repeat BP check and ongoing titration.    Exam Today:  General AA NAD  HEENT NCAT  CV RRR  Lung no wheezes or rales  Abdomen ND NT  Neuro CN II through XII grossly intact    Results:  CT Head Without Contrast  Result Date: 6/22/2025  EMERGENCY CT SCAN OF THE HEAD WITHOUT CONTRAST ON 06/21/2025  CLINICAL HISTORY: This is a 69-year-old female patient who had an acute syncopal episode, head injury.  TECHNIQUE: Spiral CTA images were obtained from the base of the skull to the vertex without intravenous contrast. The images were reformatted and are submitted in 3 mm thick axial, sagittal and coronal CT sections with brain algorithm and 2 mm thick axial CT sections with high-resolution bone algorithm.  COMPARISON:  There are no prior head CTs from [facility] for comparison.  FINDINGS: There are patchy areas of low-density in the periventricular and subcortical white matter of the cerebral hemispheres consistent with mild-moderate small vessel disease. The remainder of the brain parenchyma is normal in attenuation. The ventricles are normal in size. I see no focal mass effect. There is no midline shift. No extra axial fluid collections are identified. There is no evidence of acute intracranial hemorrhage. No acute skull fracture is identified. There are lens implants in the globes from previous cataract surgery, otherwise, the orbits are normal in appearance. The paranasal sinuses and the mastoid air cells and middle ear  cavities are clear.      1. No acute intracranial abnormality is identified.  2. There is mild-moderate small vessel disease in the cerebral white matter. There are lens implants in the globes from previous cataract surgery. The remainder of the head CT is within normal limits. Specifically, no acute skull fracture or intracranial hemorrhage is identified. If there remains any clinical suspicion for an acute infarct causing the patient's syncopal episode, an MRI of the brain could be obtained for a more comprehensive assessment.  Radiation dose reduction techniques were utilized, including automated exposure control and exposure modulation based on body size.   This report was finalized on 6/22/2025 10:55 AM by Dr. Steve Whyte M.D on Workstation: [id number]      XR Chest 1 View  Result Date: 6/21/2025  XR CHEST 1 VW-   INDICATION: Syncope, hypotension  COMPARISON: Chest radiograph June 4, 2021  TECHNIQUE: 1 view chest  FINDINGS:  No focal opacity. Suspected epicardial fat pad at the left lung base. No effusions. Stable mediastinum. Heart is normal in size.      No acute cardiopulmonary process  This report was finalized on 6/21/2025 2:39 PM by Dr. Rock Miranda M.D on Workstation: [id number]       Procedures Performed:         Consults:   Consults       Date and Time Order Name Status Description    6/21/2025  8:01 PM [facility] (on-call MD unless specified) Details               Discharge Disposition:  Home or Self Care    Discharge Medications:     Discharge Medications        Changes to Medications        Instructions Start Date   Toujeo SoloStar 300 UNIT/ML solution pen-injector injection  Generic drug: Insulin Glargine (1 Unit Dial)  What changed: how much to take   20 Units, Subcutaneous, Daily             Continue These Medications        Instructions Start Date   albuterol sulfate  (90 Base) MCG/ACT inhaler  Commonly known as: PROVENTIL HFA;VENTOLIN HFA;PROAIR HFA   2 puffs, Inhalation, Every 4  Hours PRN      amLODIPine 10 MG tablet  Commonly known as: NORVASC   10 mg, Oral, Every Night at Bedtime      atorvastatin 20 MG tablet  Commonly known as: LIPITOR   20 mg, Oral, Daily      BD Pen Needle Mariel 2nd Gen 32G X 4 MM misc  Generic drug: Insulin Pen Needle   Use QD      benzonatate 200 MG capsule  Commonly known as: TESSALON   200 mg, Oral, 3 Times Daily PRN      celecoxib 200 MG capsule  Commonly known as: CeleBREX   200 mg, Oral, 2 Times Daily      cetirizine 10 MG tablet  Commonly known as: zyrTEC   10 mg, Nightly      cyclobenzaprine 5 MG tablet  Commonly known as: FLEXERIL   5 mg, Oral, 2 Times Daily PRN      dicyclomine 10 MG capsule  Commonly known as: BENTYL   10 mg, Oral, As Needed      Dupixent 300 MG/2ML solution prefilled syringe  Generic drug: Dupilumab       empagliflozin 10 MG tablet tablet  Commonly known as: Jardiance   10 mg, Oral, Every Night at Bedtime      empagliflozin 10 MG tablet tablet  Commonly known as: JARDIANCE   10 mg, Oral, Daily      ferrous sulfate 325 (65 FE) MG tablet   325 mg, Oral, Every Night at Bedtime      hydrocortisone 2.5 % cream   1 Application, 2 Times Daily      metFORMIN  MG 24 hr tablet  Commonly known as: GLUCOPHAGE-XR   2,000 mg, Oral, Nightly      promethazine-dextromethorphan 6.25-15 MG/5ML syrup  Commonly known as: PROMETHAZINE-DM   5 mL, Oral, Nightly PRN      Semaglutide (2 MG/DOSE) 8 MG/3ML solution pen-injector  Commonly known as: OZEMPIC   2 mg, Subcutaneous, Weekly      TYLENOL ARTHRITIS PAIN PO   4 Times Daily PRN             Stop These Medications      glimepiride 4 MG tablet  Commonly known as: AMARYL     pioglitazone 30 MG tablet  Commonly known as: ACTOS     valsartan-hydrochlorothiazide 160-12.5 MG per tablet  Commonly known as: DIOVAN-HCT            ASK your doctor about these medications        Instructions Start Date   levalbuterol 0.63 MG/3ML nebulizer solution  Commonly known as: XOPENEX   1 ampule, Every 6 Hours PRN      Trelegy  Ellipta 200-62.5-25 MCG/ACT inhaler  Generic drug: Fluticasone-Umeclidin-Vilant   1 puff, Inhalation, Daily               Discharge Diet:   Diet Instructions       Diet: Cardiac Diets, Diabetic Diets; Healthy Heart (2-3 Na+); Thin (IDDSI 0); Consistent Carbohydrate      Discharge Diet:  Cardiac Diets  Diabetic Diets       Cardiac Diet: Healthy Heart (2-3 Na+)    Fluid Consistency: Thin (IDDSI 0)    Diabetic Diet: Consistent Carbohydrate            Activity at Discharge:   Activity Instructions       Activity as Tolerated              Follow-up Appointments:   Follow-up Information       Efraín Rebollar MD Follow up.    Specialty: Internal Medicine  Contact information:  [address]  [address]  [place] [id number] [phone]                             Test Results Pending at Discharge:  Pending Labs       Order Current Status    Blood Culture - Blood, Hand, Left Preliminary result    Blood Culture - Blood, Hand, Right Preliminary result             Wallace Cortez MD  06/23/25  11:26 EDT    Time Spent on Discharge Activities: >30 minutes    Dictated portions using Dragon dictation software.

## 2025-06-23 NOTE — PLAN OF CARE
Problem: Adult Inpatient Plan of Care  Goal: Plan of Care Review  Outcome: Progressing  Flowsheets (Taken 6/23/2025 0504)  Progress: improving  Outcome Evaluation: Patient is alert and oriented x 4, saturating well on room air. Blood sugars are in acceptable ranges. Fall risk prevention protocol maintained, for discharge home today.  Plan of Care Reviewed With: patient   Goal Outcome Evaluation:  Plan of Care Reviewed With: patient        Progress: improving  Outcome Evaluation: Patient is alert and oriented x 4, saturating well on room air. Blood sugars are in acceptable ranges. Fall risk prevention protocol maintained, for discharge home today.

## 2025-06-23 NOTE — PROGRESS NOTES
Case Management Discharge Note      Final Note: DC'd home with spouse         Selected Continued Care - Discharged on 6/23/2025 Admission date: 6/21/2025 - Discharge disposition: Home or Self Care                  Transportation Services  Private: Car    Final Discharge Disposition Code: 01 - home or self-care

## 2025-06-23 NOTE — PROGRESS NOTES
Discharge Planning Assessment  [facility]     Patient Name: Marina Hugo  MRN: [id number]  Today's Date: 6/23/2025    Admit Date: 6/21/2025    Plan: Return home with spouse   Discharge Needs Assessment       Row Name 06/23/25 1130       Living Environment    People in Home spouse    Name(s) of People in Home Amelia Gant    Current Living Arrangements home    Potentially Unsafe Housing Conditions none    Primary Care Provided by self    Provides Primary Care For no one    Family Caregiver if Needed spouse    Family Caregiver Names  Stalin [phone]    Quality of Family Relationships helpful    Able to Return to Prior Arrangements yes       Resource/Environmental Concerns    Resource/Environmental Concerns none    Transportation Concerns none       Transition Planning    Patient/Family Anticipates Transition to home with family    Patient/Family Anticipated Services at Transition none    Transportation Anticipated family or friend will provide       Discharge Needs Assessment    Readmission Within the Last 30 Days no previous admission in last 30 days    Equipment Currently Used at Home none    Concerns to be Addressed denies needs/concerns at this time    Anticipated Changes Related to Illness none    Equipment Needed After Discharge none                   Discharge Plan       Row Name 06/23/25 6659       Plan    Plan Return home with spouse    Patient/Family in Agreement with Plan yes    Plan Comments Spoke with patient and  Stalin [phone] at bedside.  They live in a SS house with [facility].  She is IADL, uses no DME, has never used HH or been to SNF.  PCP is Dr. Efraín Rebollar and [facility].  Patient drives,  drives and will assist if needed.  She plans to return home at [place].  CCP will follow.  Rob JOHN                    Expected Discharge Date and Time       Expected Discharge Date Expected Discharge Time    Jun 23, 2025            Demographic Summary       Row Name  06/23/25 1129       General Information    Admission Type inpatient    Arrived From home    Referral Source admission list    Reason for Consult discharge planning    Preferred Language English                   Functional Status       Row Name 06/23/25 1129       Functional Status    Usual Activity Tolerance good    Current Activity Tolerance moderate       Functional Status, IADL    Medications independent    Meal Preparation independent    Housekeeping independent    Laundry independent    Shopping independent;assistive person   will help if needed       Mental Status    General Appearance WDL WDL       Mental Status Summary    Recent Changes in Mental Status/Cognitive Functioning no changes                                   Becky S. [place], RN

## 2025-06-23 NOTE — OUTREACH NOTE
Prep Survey      Flowsheet Row Responses   [facility] [place] facility patient discharged from? [facility]   Is LACE score < 7 ? No   Eligibility [facility]   Date of Admission 06/21/25   Date of Discharge 06/23/25   Discharge Disposition Home or Self Care   Discharge diagnosis Syncope and collapse   Does the patient have one of the following disease processes/diagnoses(primary or secondary)? Other   Does the patient have Home health ordered? No   Is there a DME ordered? No   Prep survey completed? Yes            REENA A - Registered Nurse

## 2025-06-23 NOTE — PLAN OF CARE
Goal Outcome Evaluation:            Pt ready for d/c, Diabetic educator to see patient before d/c

## 2025-06-24 ENCOUNTER — TRANSITIONAL CARE MANAGEMENT TELEPHONE ENCOUNTER (OUTPATIENT)
Dept: CALL CENTER | Facility: HOSPITAL | Age: 70
End: 2025-06-24
Payer: MEDICARE

## 2025-06-24 NOTE — OUTREACH NOTE
Call Center TCM Note      Flowsheet Row Responses   [facility] patient discharged from? [place]   Does the patient have one of the following disease processes/diagnoses(primary or secondary)? Other   TCM attempt successful? Yes   Call start time 1632   Call end time 1635   Discharge diagnosis Syncope and collapse   Person spoke with today (if not patient) and relationship Patient   Meds reviewed with patient/caregiver? Yes   Is the patient having any side effects they believe may be caused by any medication additions or changes? No   Does the patient have all medications ordered at discharge? N/A   Prescription comments No new medications at time of discharge. Patient is aware of new dosage change for Toujeo Solostar. She has stopped taking Amaryl, Actos, Diovan-HCT as advised. No questions or concerns.   Is the patient taking all medications as directed (includes completed medication regime)? Yes   Comments PCP HOSPITAL f/u appt on 6/25/25 at 4:15 PM with Dr. Efraín Rebollar.   Does the patient have an appointment with their PCP within 7-14 days of discharge? Yes   Has home health visited the patient within 72 hours of discharge? N/A   Psychosocial issues? No   Comments Patient states she is doing much better. Blood sugar and blood pressure are wnl. No questions or concerns.   Did the patient receive a copy of their discharge instructions? Yes   Nursing interventions Reviewed instructions with patient   What is the patient's perception of their health status since discharge? Improving   Is the patient/caregiver able to teach back signs and symptoms related to disease process for when to call PCP? Yes   Is the patient/caregiver able to teach back signs and symptoms related to disease process for when to call 911? Yes   Is the patient/caregiver able to teach back the hierarchy of who to call/visit for symptoms/problems? PCP, Specialist, Home health nurse, Urgent Care, ED, 911 Yes   If the patient is a current  smoker, are they able to teach back resources for cessation? Not a smoker   TCM call completed? Yes   Wrap up additional comments PCP HOSPITAL f/u appt on 6/25/25 at 4:15 PM with Dr. Efraín Rebollar.   Call end time 1635   Would this patient benefit from a Referral to [facility] Social Work? No   Is the patient interested in additional calls from an ambulatory ? No            Lien LONGORIA - Registered Nurse    6/24/2025, 16:35 EDT

## 2025-06-25 ENCOUNTER — OFFICE VISIT (OUTPATIENT)
Dept: FAMILY MEDICINE CLINIC | Facility: CLINIC | Age: 70
End: 2025-06-25
Payer: MEDICARE

## 2025-06-25 VITALS
BODY MASS INDEX: 49.08 KG/M2 | HEIGHT: 63 IN | TEMPERATURE: 98.3 F | OXYGEN SATURATION: 95 % | WEIGHT: 277 LBS | HEART RATE: 76 BPM

## 2025-06-25 DIAGNOSIS — E11.649 TYPE 2 DIABETES MELLITUS WITH HYPOGLYCEMIA WITHOUT COMA, WITH LONG-TERM CURRENT USE OF INSULIN: Primary | ICD-10-CM

## 2025-06-25 DIAGNOSIS — Z79.4 TYPE 2 DIABETES MELLITUS WITH HYPOGLYCEMIA WITHOUT COMA, WITH LONG-TERM CURRENT USE OF INSULIN: Primary | ICD-10-CM

## 2025-06-25 RX ORDER — ACYCLOVIR 400 MG/1
1 TABLET ORAL
Qty: 3 EACH | Refills: 11 | Status: SHIPPED | OUTPATIENT
Start: 2025-06-25 | End: 2025-06-30 | Stop reason: SDUPTHER

## 2025-06-25 RX ORDER — ACYCLOVIR 400 MG/1
1 TABLET ORAL
Qty: 1 EACH | Refills: 0 | Status: SHIPPED | OUTPATIENT
Start: 2025-06-25 | End: 2025-06-30 | Stop reason: SDUPTHER

## 2025-06-25 NOTE — PROGRESS NOTES
Subjective   Marina Hugo is a 69 y.o. female.     Chief Complaint   Patient presents with    Hospital Follow Up Visit     6/21/25 went to ER -Passed out and EMS took her to Hospital.  Blood sugar was 33 and        History of Present Illness   Within 48 business hours after discharge our office contacted her via telephone to coordinate her care and needs.      6/23/2025     7:10 PM   Date of TCM Phone Call   [facility]   Date of Admission 6/21/2025   Date of Discharge 6/23/2025   Discharge Disposition Home or Self Care     Risk for Readmission (LACE) Score: 8 (6/23/2025  6:00 AM)      Patient was admitted to [facility]  ALL records were obtained and reviewed and /or discussed with admitting physician  Date of admission 6/21/2025  Date of discharge 6/23/2025  Diagnosis hypoglycemic episode with syncope and stress-induced leukocytosis.  Hospital Course 69-year-old female history of hypertension, type 2 diabetes requiring insulin, hyperlipidemia, asthma, obesity, osteoarthritis of the knees.  Presents emergency room after she was without eating for approximately 20 hours but still took her insulin and was feeling ill with a presyncopal episode near passing out for falling to ground.  Was noted to be hypoglycemic with the blood sugar down to 36 and EMS given IM glucagon secondary to the no IV access.  In emergency room was noted to have a persistent low blood sugar of 44 and given IV dextrose and D5W.  Patient had improved but because of the incidence was admitted.  Upon admission the blood sugar is noted to be improved with an A1c of 6.6 which is a significant drop in her A1c in the last 3 months.  Her glimepiride and pioglitazone were discontinued and patient was monitored in hospital.  CT of the head demonstrated no acute abnormalities.  Labs were noted initially have a white count elevated 13.3 but at discharge was 7.42 normal phosphorus, magnesium, BMP, TSH, and blood cultures.  No  evidence for UTI was found.   Medications upon discharge Albuterol inhaler as needed, amlodipine 10 mg daily, atorvastatin 20 mg daily, Celebrex 200 mg daily, Zyrtec 10 mg daily, dicyclomine 10 mg as needed abdominal cramping, Dupixent, Jardiance 10 mg daily, insulin glargine, metformin  mg 4 tablets at night, (semaglutide pending per insurance authorization)  Disposition Home with   Follow up PMD today  Currently c/o feeling well other than some bruises from her ordeal.  Is not checking home blood sugar.  Condition stable    I reviewed and requested records labs and diagnostics from the hospital with the patient and family.  The patient is to follow-up with specialist as discussed and directed.  If any problems arise or further questions develop patient is to call or to contact us for any needs.    The following portions of the patient's history were reviewed and updated as appropriate: allergies, current medications, past family history, past medical history, past social history, past surgical history and problem list.    Past Medical History:   Diagnosis Date    Anemia     Anesthesia complication     SLOW TO WAKE UP    Arthritis     Asthma     Diabetes mellitus     High cholesterol     Hyperlipidemia     Hypertension     Irritable bowel syndrome     Obesity     Rotator cuff tear     Routine eye exam due    Sleep apnea     MOUTH PIECE       Past Surgical History:   Procedure Laterality Date    CARPAL TUNNEL RELEASE Right      SECTION      COLONOSCOPY      LAPAROSCOPIC GASTRIC BANDING      SHOULDER ROTATOR CUFF REPAIR Right 2021    Procedure: RIGHT SHOULDER ARTHROSCOPY WITH ACROMIOPLASTY, ROTATOR CUFF AND LABRAL DEBRIDEMENT;  Surgeon: Raj Walker MD;  Location: [facility] OR [facility];  Service: Orthopedics;  Laterality: Right;       Family History   Problem Relation Age of Onset    Anxiety disorder Mother     Colon cancer Mother     Depression Mother     Diabetes Mother     Hypertension  Mother     Nephrolithiasis Mother     Macular degeneration Mother     Diabetes Father     Heart disease Father     Hypertension Father     Malig Hyperthermia Neg Hx        Social History     Socioeconomic History    Marital status:    Tobacco Use    Smoking status: Never     Passive exposure: Never    Smokeless tobacco: Never   Vaping Use    Vaping status: Never Used   Substance and Sexual Activity    Alcohol use: No     Comment: Caffeine use: 2-3 cups daily    Drug use: No    Sexual activity: Defer       Current Outpatient Medications   Medication Sig Dispense Refill    Acetaminophen (TYLENOL ARTHRITIS PAIN PO) Take  by mouth 4 (Four) Times a Day As Needed.      albuterol sulfate  (90 Base) MCG/ACT inhaler Inhale 2 puffs Every 4 (Four) Hours As Needed for Wheezing. 18 g 3    amLODIPine (NORVASC) 10 MG tablet TAKE ONE TABLET BY MOUTH AT BEDTIME 90 tablet 1    atorvastatin (LIPITOR) 20 MG tablet Take 1 tablet by mouth Daily. 90 tablet 1    celecoxib (CeleBREX) 200 MG capsule Take 1 capsule by mouth 2 (Two) Times a Day. 180 capsule 1    cetirizine (zyrTEC) 10 MG tablet Take 1 tablet by mouth Every Night.      cyclobenzaprine (FLEXERIL) 5 MG tablet Take 1 tablet by mouth 2 (Two) Times a Day As Needed for Muscle Spasms. 20 tablet 0    dicyclomine (BENTYL) 10 MG capsule Take 1 capsule by mouth As Needed for Abdominal Cramping. 60 capsule 5    Dupixent 300 MG/2ML solution prefilled syringe       empagliflozin (JARDIANCE) 10 MG tablet tablet Take 1 tablet by mouth Daily. 7 tablet 0    hydrocortisone 2.5 % cream Apply 1 Application topically to the appropriate area as directed 2 (Two) Times a Day.      Insulin Glargine, 1 Unit Dial, (Toujeo SoloStar) 300 UNIT/ML solution pen-injector injection Inject 20 Units under the skin into the appropriate area as directed Daily.      Insulin Pen Needle (BD Pen Needle Mariel 2nd Gen) 32G X 4 MM misc Use  each 3    metFORMIN ER (GLUCOPHAGE-XR) 500 MG 24 hr tablet Take  4 tablets by mouth Every Night. 360 tablet 1    promethazine-dextromethorphan (PROMETHAZINE-DM) 6.25-15 MG/5ML syrup Take 5 mL by mouth At Night As Needed for Cough. 118 mL 0    Semaglutide, 2 MG/DOSE, (OZEMPIC) 8 MG/3ML solution pen-injector Inject 2 mg under the skin into the appropriate area as directed 1 (One) Time Per Week. (Patient taking differently: Inject 2 mg under the skin into the appropriate area as directed 1 (One) Time Per Week. Not gotten yet) 9 mL 1    benzonatate (TESSALON) 200 MG capsule Take 1 capsule by mouth 3 (Three) Times a Day As Needed for Cough. (Patient not taking: Reported on 6/25/2025) 30 capsule 11    Continuous Glucose  (Dexcom G7 ) device Use 1 Device Every 10 (Ten) Days. 1 each 0    Continuous Glucose Sensor (Dexcom G7 Sensor) misc Use 1 Device Every 10 (Ten) Days. 3 each 11    levalbuterol (XOPENEX) 0.63 MG/3ML nebulizer solution Take 1 ampule by nebulization Every 6 (Six) Hours As Needed. (Patient not taking: Reported on 6/25/2025)       No current facility-administered medications for this visit.       Review of Systems   Constitutional:  Negative for activity change, appetite change, fatigue, fever, unexpected weight gain and unexpected weight loss.   HENT:  Negative for nosebleeds, rhinorrhea, trouble swallowing and voice change.    Eyes:  Negative for visual disturbance.   Respiratory:  Negative for cough, chest tightness, shortness of breath and wheezing.    Cardiovascular:  Negative for chest pain, palpitations and leg swelling.   Gastrointestinal:  Negative for abdominal pain, blood in stool, constipation, diarrhea, nausea, vomiting, GERD and indigestion.   Genitourinary:  Negative for dysuria, frequency and hematuria.   Musculoskeletal:  Negative for arthralgias, back pain and myalgias.   Skin:  Negative for rash and wound.   Neurological:  Negative for dizziness, tremors, weakness, light-headedness, numbness, headache and memory problem.   Hematological:   Negative for adenopathy. Does not bruise/bleed easily.   Psychiatric/Behavioral:  Negative for sleep disturbance and depressed mood. The patient is not nervous/anxious.        Objective   Vitals:    06/25/25 1636   Pulse: 76   Temp: 98.3 °F (36.8 °C)   SpO2: 95%     Body mass index is 49.08 kg/m².  Physical Exam  Vitals and nursing note reviewed.   Constitutional:       General: She is not in acute distress.     Appearance: She is well-developed. She is obese. She is not diaphoretic.   HENT:      Head: Normocephalic and atraumatic.      Right Ear: External ear normal.      Left Ear: External ear normal.      Nose: Nose normal.   Eyes:      Conjunctiva/sclera: Conjunctivae normal.      Pupils: Pupils are equal, round, and reactive to light.   Neck:      Thyroid: No thyromegaly.      Trachea: No tracheal deviation.   Cardiovascular:      Rate and Rhythm: Normal rate and regular rhythm.      Heart sounds: Normal heart sounds. No murmur heard.     No friction rub. No gallop.   Pulmonary:      Effort: Pulmonary effort is normal. No respiratory distress.      Breath sounds: Normal breath sounds.   Abdominal:      General: Bowel sounds are normal.      Palpations: Abdomen is soft. There is no mass.      Tenderness: There is no abdominal tenderness. There is no guarding.   Musculoskeletal:         General: Normal range of motion.      Cervical back: Normal range of motion and neck supple.   Lymphadenopathy:      Cervical: No cervical adenopathy.   Skin:     General: Skin is warm and dry.      Capillary Refill: Capillary refill takes less than 2 seconds.      Findings: No rash.      Comments: Multiple small bruises on her arms and hands.  Evidence of bruising on the posterior aspect of the proximal right arm with tenderness in the right arm with pressure and blood pressure cuff application.   Neurological:      Mental Status: She is alert and oriented to person, place, and time.      Motor: No abnormal muscle tone.      Deep  Tendon Reflexes: Reflexes normal.   Psychiatric:         Behavior: Behavior normal.         Thought Content: Thought content normal.         Judgment: Judgment normal.           Assessment & Plan   Diagnoses and all orders for this visit:    1. Type 2 diabetes mellitus with hypoglycemia without coma, with long-term current use of insulin (Primary)  -     Continuous Glucose Sensor (Dexcom G7 Sensor) misc; Use 1 Device Every 10 (Ten) Days.  Dispense: 3 each; Refill: 11  -     Continuous Glucose  (Dexcom G7 ) device; Use 1 Device Every 10 (Ten) Days.  Dispense: 1 each; Refill: 0    Type 2 diabetes with history of hypoglycemic episode without coma and with use of long-term insulin.  Recommend use of the Dexcom sensor and  for continuous glucose monitoring to avoid hypoglycemia.  Recommend to also continue with her insulin staying off the glimepiride and the pioglitazone.  Encouraged her to carry glucose tablets uses as needed if low blood sugar.  Will continue with the insulin glargine 20 units daily at this time while we monitor her blood sugar and trying get her other medications through patient assistance.  Patient to follow-up in approximately 3 months.                COVID-19 Precautions - Patient was compliant in wearing a mask. When I saw the patient, I used appropriate personal protective equipment (PPE) including mask and eye shield (standard procedure).  Additionally, I used gown and gloves if indicated.  Hand hygiene was completed before and after seeing the patient.  Dictated utilizing Dragon Dictation

## 2025-06-27 LAB
BACTERIA SPEC AEROBE CULT: NORMAL
BACTERIA SPEC AEROBE CULT: NORMAL

## 2025-06-30 DIAGNOSIS — Z79.4 TYPE 2 DIABETES MELLITUS WITH HYPOGLYCEMIA WITHOUT COMA, WITH LONG-TERM CURRENT USE OF INSULIN: ICD-10-CM

## 2025-06-30 DIAGNOSIS — E11.649 TYPE 2 DIABETES MELLITUS WITH HYPOGLYCEMIA WITHOUT COMA, WITH LONG-TERM CURRENT USE OF INSULIN: ICD-10-CM

## 2025-06-30 RX ORDER — ACYCLOVIR 400 MG/1
1 TABLET ORAL
Qty: 1 EACH | Refills: 0 | Status: SHIPPED | OUTPATIENT
Start: 2025-06-30

## 2025-06-30 RX ORDER — ACYCLOVIR 400 MG/1
1 TABLET ORAL
Qty: 3 EACH | Refills: 11 | Status: SHIPPED | OUTPATIENT
Start: 2025-06-30

## 2025-06-30 NOTE — TELEPHONE ENCOUNTER
.LOV-  6/25/25  .NOV- n/a  .LF- Ordered on 6/26/25 to [facility] and they do not take the Medicare supplement and sending over to [facility] to see if they cover the Dexcom     Protocols met

## 2025-06-30 NOTE — TELEPHONE ENCOUNTER
Patient called and states [facility] has the prescriptions, but they don't have the product.  They should get it tomorrow or Wednesday.

## 2025-07-03 ENCOUNTER — TELEPHONE (OUTPATIENT)
Dept: FAMILY MEDICINE CLINIC | Facility: CLINIC | Age: 70
End: 2025-07-03
Payer: MEDICARE

## 2025-07-03 NOTE — TELEPHONE ENCOUNTER
Caller: Marina Hugo    Relationship to patient: Self    Patient is needing: PATIENT IS CALLING STATING A PRIOR AUTHORIZATION IS REQUIRED FOR THE DEXCOM MONITOR.

## 2025-07-08 ENCOUNTER — READMISSION MANAGEMENT (OUTPATIENT)
Dept: CALL CENTER | Facility: HOSPITAL | Age: 70
End: 2025-07-08
Payer: MEDICARE

## 2025-07-08 ENCOUNTER — TELEPHONE (OUTPATIENT)
Dept: FAMILY MEDICINE CLINIC | Facility: CLINIC | Age: 70
End: 2025-07-08

## 2025-07-08 NOTE — OUTREACH NOTE
Medical Week 2 Survey      Flowsheet Row Responses   [facility] patient discharged from? [place]   Does the patient have one of the following disease processes/diagnoses(primary or secondary)? Other   Week 2 attempt successful? No   Unsuccessful attempts Attempt 1   Revoke Herman SYKES - Registered Nurse

## 2025-07-09 DIAGNOSIS — E11.649 TYPE 2 DIABETES MELLITUS WITH HYPOGLYCEMIA WITHOUT COMA, WITH LONG-TERM CURRENT USE OF INSULIN: Primary | ICD-10-CM

## 2025-07-09 DIAGNOSIS — E78.2 MIXED HYPERLIPIDEMIA: Chronic | ICD-10-CM

## 2025-07-09 DIAGNOSIS — Z79.4 TYPE 2 DIABETES MELLITUS WITH HYPOGLYCEMIA WITHOUT COMA, WITH LONG-TERM CURRENT USE OF INSULIN: Primary | ICD-10-CM

## 2025-07-09 RX ORDER — ATORVASTATIN CALCIUM 20 MG/1
20 TABLET, FILM COATED ORAL DAILY
Qty: 90 TABLET | Refills: 0 | OUTPATIENT
Start: 2025-07-09

## 2025-07-09 RX ORDER — DAPAGLIFLOZIN 10 MG/1
10 TABLET, FILM COATED ORAL DAILY
Qty: 90 TABLET | Refills: 3 | Status: SHIPPED | OUTPATIENT
Start: 2025-07-09

## 2025-07-11 ENCOUNTER — PRIOR AUTHORIZATION (OUTPATIENT)
Dept: FAMILY MEDICINE CLINIC | Facility: CLINIC | Age: 70
End: 2025-07-11
Payer: MEDICARE

## 2025-07-11 NOTE — TELEPHONE ENCOUNTER
PA started 7/11/25    Dx: E11.649, Z79.4    Key [id number]      No Prior Authorization is required at this time based on the alternative drug you chose to prescribe.;CaseId:[id number];Status:Cancelled;Explanation:DAPAGLIFLOZIN 10 MG TABLET;Appeal Information

## 2025-08-04 DIAGNOSIS — G57.03 PIRIFORMIS SYNDROME OF BOTH SIDES: Chronic | ICD-10-CM

## 2025-08-04 DIAGNOSIS — E11.9 TYPE 2 DIABETES MELLITUS WITHOUT COMPLICATION, WITHOUT LONG-TERM CURRENT USE OF INSULIN: Chronic | ICD-10-CM

## 2025-08-04 RX ORDER — METFORMIN HYDROCHLORIDE 500 MG/1
2000 TABLET, EXTENDED RELEASE ORAL NIGHTLY
Qty: 360 TABLET | Refills: 0 | OUTPATIENT
Start: 2025-08-04

## 2025-08-04 RX ORDER — CELECOXIB 200 MG/1
200 CAPSULE ORAL EVERY 12 HOURS SCHEDULED
Qty: 120 CAPSULE | Refills: 0 | OUTPATIENT
Start: 2025-08-04

## 2025-08-06 DIAGNOSIS — E78.2 MIXED HYPERLIPIDEMIA: Chronic | ICD-10-CM

## 2025-08-06 DIAGNOSIS — G57.03 PIRIFORMIS SYNDROME OF BOTH SIDES: Chronic | ICD-10-CM

## 2025-08-06 DIAGNOSIS — E11.9 TYPE 2 DIABETES MELLITUS WITHOUT COMPLICATION, WITHOUT LONG-TERM CURRENT USE OF INSULIN: Chronic | ICD-10-CM

## 2025-08-06 RX ORDER — ATORVASTATIN CALCIUM 20 MG/1
20 TABLET, FILM COATED ORAL DAILY
Qty: 90 TABLET | Refills: 1 | Status: SHIPPED | OUTPATIENT
Start: 2025-08-06

## 2025-08-06 RX ORDER — CELECOXIB 200 MG/1
200 CAPSULE ORAL 2 TIMES DAILY
Qty: 180 CAPSULE | Refills: 1 | Status: SHIPPED | OUTPATIENT
Start: 2025-08-06

## 2025-08-06 RX ORDER — METFORMIN HYDROCHLORIDE 500 MG/1
2000 TABLET, EXTENDED RELEASE ORAL NIGHTLY
Qty: 360 TABLET | Refills: 1 | Status: SHIPPED | OUTPATIENT
Start: 2025-08-06

## 2025-08-07 RX ORDER — METFORMIN HYDROCHLORIDE 500 MG/1
2000 TABLET, EXTENDED RELEASE ORAL NIGHTLY
Qty: 360 TABLET | Refills: 0 | OUTPATIENT
Start: 2025-08-07

## 2025-08-07 RX ORDER — ATORVASTATIN CALCIUM 20 MG/1
20 TABLET, FILM COATED ORAL DAILY
Qty: 90 TABLET | Refills: 0 | OUTPATIENT
Start: 2025-08-07

## 2025-08-07 RX ORDER — CELECOXIB 200 MG/1
200 CAPSULE ORAL EVERY 12 HOURS SCHEDULED
Qty: 120 CAPSULE | Refills: 0 | OUTPATIENT
Start: 2025-08-07

## 2025-08-18 DIAGNOSIS — E11.65 TYPE 2 DIABETES MELLITUS WITH HYPERGLYCEMIA, WITHOUT LONG-TERM CURRENT USE OF INSULIN: Primary | ICD-10-CM

## 2025-08-18 DIAGNOSIS — E11.65 TYPE 2 DIABETES MELLITUS WITH HYPERGLYCEMIA, WITHOUT LONG-TERM CURRENT USE OF INSULIN: ICD-10-CM

## (undated) DEVICE — GLV SURG BIOGEL LTX PF 8 1/2

## (undated) DEVICE — BLD SHAVER BONECUTTER 4MM 13CM

## (undated) DEVICE — ABL ASP APOLLO RF XL 90D

## (undated) DEVICE — PK ARTHSCP SHLDR TOWER 40

## (undated) DEVICE — SUT ETHLN 3/0 PS1 18IN 1663H

## (undated) DEVICE — GLV SURG BIOGEL LTX PF 8

## (undated) DEVICE — KT POSTN ARM TRIMANO BEACH CHR W/DRP

## (undated) DEVICE — CANN TRPL DAM 7X7MM NO VLV

## (undated) DEVICE — CANN TRPL DAM 7X7MM

## (undated) DEVICE — DRSNG GZ PETROLTM XEROFORM CURAD 1X8IN STRL

## (undated) DEVICE — GLV SURG BIOGEL LTX PF 6 1/2

## (undated) DEVICE — GLV SURG BIOGEL LTX PF 7

## (undated) DEVICE — BLANKT WARM LOWR/BDY 100X120CM

## (undated) DEVICE — TBG ARTHSCP PT W CONN/REDUC 8FT

## (undated) DEVICE — SKIN PREP TRAY W/CHG: Brand: MEDLINE INDUSTRIES, INC.

## (undated) DEVICE — TBG ARTHSCP PUMP W CONN/REDUC 8FT

## (undated) DEVICE — ARM SLING: Brand: DEROYAL

## (undated) DEVICE — BUR SHAVER FLUSHCUT 8FLUT 5MM 13CM